# Patient Record
Sex: MALE | Race: WHITE | NOT HISPANIC OR LATINO | Employment: OTHER | URBAN - METROPOLITAN AREA
[De-identification: names, ages, dates, MRNs, and addresses within clinical notes are randomized per-mention and may not be internally consistent; named-entity substitution may affect disease eponyms.]

---

## 2020-09-04 ENCOUNTER — OUTSIDE PLACE OF SERVICE (OUTPATIENT)
Dept: ADMINISTRATIVE | Facility: OTHER | Age: 65
End: 2020-09-04
Payer: MEDICARE

## 2020-09-04 PROCEDURE — 99222 1ST HOSP IP/OBS MODERATE 55: CPT | Mod: ,,, | Performed by: THORACIC SURGERY (CARDIOTHORACIC VASCULAR SURGERY)

## 2020-09-04 PROCEDURE — 99222 PR INITIAL HOSPITAL CARE,LEVL II: ICD-10-PCS | Mod: ,,, | Performed by: THORACIC SURGERY (CARDIOTHORACIC VASCULAR SURGERY)

## 2021-10-02 PROBLEM — R55 SYNCOPE: Status: ACTIVE | Noted: 2021-10-02

## 2021-10-02 PROBLEM — F10.20 CHRONIC ALCOHOLISM: Status: ACTIVE | Noted: 2021-10-02

## 2021-10-02 PROBLEM — R79.89 ABNORMAL LIVER FUNCTION TEST: Status: ACTIVE | Noted: 2021-10-02

## 2021-10-02 PROBLEM — K80.20 CHOLELITHIASIS WITHOUT CHOLECYSTITIS: Status: ACTIVE | Noted: 2021-10-02

## 2021-10-02 PROBLEM — I71.40 ABDOMINAL AORTIC ANEURYSM: Status: ACTIVE | Noted: 2021-10-02

## 2021-10-02 PROBLEM — R55 SYNCOPE AND COLLAPSE: Status: ACTIVE | Noted: 2021-10-02

## 2021-10-02 PROBLEM — E11.9 TYPE 2 DIABETES MELLITUS: Status: ACTIVE | Noted: 2021-10-02

## 2021-10-02 PROBLEM — J43.8 OTHER EMPHYSEMA: Status: ACTIVE | Noted: 2021-10-02

## 2021-10-02 PROBLEM — I10 ESSENTIAL HYPERTENSION: Status: ACTIVE | Noted: 2021-10-02

## 2021-10-02 PROBLEM — I72.3 ANEURYSM OF LEFT ILIAC ARTERY: Status: ACTIVE | Noted: 2021-10-02

## 2021-10-03 PROBLEM — R53.1 WEAKNESS: Status: ACTIVE | Noted: 2021-10-03

## 2021-10-04 PROBLEM — Z78.9 ALCOHOL USE: Status: ACTIVE | Noted: 2021-10-04

## 2021-10-04 PROBLEM — F10.20 CHRONIC ALCOHOLISM: Status: RESOLVED | Noted: 2021-10-02 | Resolved: 2021-10-04

## 2021-10-04 PROBLEM — F10.90 ALCOHOL USE: Status: ACTIVE | Noted: 2021-10-04

## 2021-10-04 PROBLEM — G62.9 NEUROPATHY: Status: ACTIVE | Noted: 2021-10-04

## 2021-10-04 PROBLEM — R55 SYNCOPE: Status: RESOLVED | Noted: 2021-10-02 | Resolved: 2021-10-04

## 2021-10-04 PROBLEM — Z71.89 ACP (ADVANCE CARE PLANNING): Status: ACTIVE | Noted: 2021-10-04

## 2021-10-04 PROBLEM — J43.8 OTHER EMPHYSEMA: Status: RESOLVED | Noted: 2021-10-02 | Resolved: 2021-10-04

## 2021-10-04 PROBLEM — J43.9 EMPHYSEMA OF LUNG: Status: ACTIVE | Noted: 2021-10-04

## 2021-10-04 PROBLEM — R00.1 BRADYCARDIA: Status: ACTIVE | Noted: 2021-10-04

## 2021-10-07 PROBLEM — E53.8 VITAMIN B12 DEFICIENCY: Status: RESOLVED | Noted: 2021-10-07 | Resolved: 2021-10-07

## 2021-10-07 PROBLEM — E53.8 VITAMIN B12 DEFICIENCY: Status: ACTIVE | Noted: 2021-10-07

## 2021-10-08 PROBLEM — I95.1 ORTHOSTATIC SYNCOPE: Status: ACTIVE | Noted: 2021-10-08

## 2021-12-22 ENCOUNTER — LAB VISIT (OUTPATIENT)
Dept: LAB | Facility: HOSPITAL | Age: 66
End: 2021-12-22
Attending: NURSE PRACTITIONER
Payer: MEDICARE

## 2021-12-22 ENCOUNTER — OFFICE VISIT (OUTPATIENT)
Dept: INTERNAL MEDICINE | Facility: CLINIC | Age: 66
End: 2021-12-22
Payer: MEDICARE

## 2021-12-22 ENCOUNTER — PATIENT MESSAGE (OUTPATIENT)
Dept: INTERNAL MEDICINE | Facility: CLINIC | Age: 66
End: 2021-12-22

## 2021-12-22 VITALS
HEART RATE: 78 BPM | BODY MASS INDEX: 21.71 KG/M2 | WEIGHT: 178.38 LBS | RESPIRATION RATE: 16 BRPM | SYSTOLIC BLOOD PRESSURE: 120 MMHG | DIASTOLIC BLOOD PRESSURE: 70 MMHG | TEMPERATURE: 98 F

## 2021-12-22 DIAGNOSIS — I10 ESSENTIAL HYPERTENSION: Primary | ICD-10-CM

## 2021-12-22 DIAGNOSIS — R74.8 ELEVATED LIVER ENZYMES: ICD-10-CM

## 2021-12-22 DIAGNOSIS — I72.3 ANEURYSM OF LEFT ILIAC ARTERY: ICD-10-CM

## 2021-12-22 DIAGNOSIS — F32.A DEPRESSION, UNSPECIFIED DEPRESSION TYPE: ICD-10-CM

## 2021-12-22 DIAGNOSIS — E78.5 HYPERLIPIDEMIA, UNSPECIFIED HYPERLIPIDEMIA TYPE: ICD-10-CM

## 2021-12-22 DIAGNOSIS — I25.10 CORONARY ARTERY DISEASE, UNSPECIFIED VESSEL OR LESION TYPE, UNSPECIFIED WHETHER ANGINA PRESENT, UNSPECIFIED WHETHER NATIVE OR TRANSPLANTED HEART: ICD-10-CM

## 2021-12-22 DIAGNOSIS — J44.9 CHRONIC OBSTRUCTIVE PULMONARY DISEASE, UNSPECIFIED COPD TYPE: ICD-10-CM

## 2021-12-22 DIAGNOSIS — E11.9 TYPE 2 DIABETES MELLITUS WITHOUT COMPLICATION, WITHOUT LONG-TERM CURRENT USE OF INSULIN: ICD-10-CM

## 2021-12-22 DIAGNOSIS — I71.40 ABDOMINAL AORTIC ANEURYSM (AAA) WITHOUT RUPTURE: ICD-10-CM

## 2021-12-22 DIAGNOSIS — K80.20 CALCULUS OF GALLBLADDER WITHOUT CHOLECYSTITIS WITHOUT OBSTRUCTION: ICD-10-CM

## 2021-12-22 DIAGNOSIS — G62.9 NEUROPATHY: ICD-10-CM

## 2021-12-22 PROBLEM — R79.89 ELEVATED LIVER FUNCTION TESTS: Status: ACTIVE | Noted: 2021-12-09

## 2021-12-22 PROBLEM — F10.20 ALCOHOLISM: Status: ACTIVE | Noted: 2020-05-26

## 2021-12-22 PROCEDURE — 36415 COLL VENOUS BLD VENIPUNCTURE: CPT | Performed by: NURSE PRACTITIONER

## 2021-12-22 PROCEDURE — 80053 COMPREHEN METABOLIC PANEL: CPT | Performed by: NURSE PRACTITIONER

## 2021-12-22 PROCEDURE — 99999 PR PBB SHADOW E&M-EST. PATIENT-LVL V: CPT | Mod: PBBFAC,,, | Performed by: NURSE PRACTITIONER

## 2021-12-22 PROCEDURE — 99999 PR PBB SHADOW E&M-EST. PATIENT-LVL V: ICD-10-PCS | Mod: PBBFAC,,, | Performed by: NURSE PRACTITIONER

## 2021-12-22 PROCEDURE — 99204 OFFICE O/P NEW MOD 45 MIN: CPT | Mod: S$PBB,,, | Performed by: NURSE PRACTITIONER

## 2021-12-22 PROCEDURE — 99215 OFFICE O/P EST HI 40 MIN: CPT | Mod: PBBFAC | Performed by: NURSE PRACTITIONER

## 2021-12-22 PROCEDURE — 99204 PR OFFICE/OUTPT VISIT, NEW, LEVL IV, 45-59 MIN: ICD-10-PCS | Mod: S$PBB,,, | Performed by: NURSE PRACTITIONER

## 2021-12-22 RX ORDER — METOPROLOL TARTRATE 50 MG/1
50 TABLET ORAL
COMMUNITY
Start: 2021-12-09 | End: 2022-01-25

## 2021-12-22 RX ORDER — METFORMIN HYDROCHLORIDE 1000 MG/1
1000 TABLET ORAL 2 TIMES DAILY WITH MEALS
Qty: 180 TABLET | Refills: 1 | Status: SHIPPED | OUTPATIENT
Start: 2021-12-22 | End: 2022-04-19 | Stop reason: SDUPTHER

## 2021-12-22 RX ORDER — AMLODIPINE BESYLATE 10 MG/1
10 TABLET ORAL DAILY
Qty: 90 TABLET | Refills: 1 | Status: SHIPPED | OUTPATIENT
Start: 2021-12-22 | End: 2022-07-25 | Stop reason: SDUPTHER

## 2021-12-22 RX ORDER — METFORMIN HYDROCHLORIDE 1000 MG/1
1000 TABLET ORAL
COMMUNITY
Start: 2021-12-09 | End: 2021-12-22 | Stop reason: SDUPTHER

## 2021-12-22 RX ORDER — ACETAMINOPHEN 325 MG/1
650 TABLET, FILM COATED ORAL EVERY 6 HOURS PRN
COMMUNITY
Start: 2021-12-20

## 2021-12-22 RX ORDER — CITALOPRAM 20 MG/1
20 TABLET, FILM COATED ORAL DAILY
Qty: 90 TABLET | Refills: 3 | Status: SHIPPED | OUTPATIENT
Start: 2021-12-22 | End: 2023-02-27 | Stop reason: SDUPTHER

## 2021-12-22 RX ORDER — AMLODIPINE BESYLATE 10 MG/1
TABLET ORAL
COMMUNITY
Start: 2021-12-06 | End: 2021-12-22 | Stop reason: SDUPTHER

## 2021-12-22 RX ORDER — CLONIDINE HYDROCHLORIDE 0.2 MG/1
0.2 TABLET ORAL
COMMUNITY
Start: 2021-12-09 | End: 2022-01-25

## 2021-12-22 RX ORDER — ZINC GLUCONATE 50 MG
1000 TABLET ORAL
COMMUNITY
Start: 2021-12-20

## 2021-12-23 LAB
ALBUMIN SERPL BCP-MCNC: 3.6 G/DL (ref 3.5–5.2)
ALP SERPL-CCNC: 92 U/L (ref 55–135)
ALT SERPL W/O P-5'-P-CCNC: 20 U/L (ref 10–44)
ANION GAP SERPL CALC-SCNC: 8 MMOL/L (ref 8–16)
AST SERPL-CCNC: 19 U/L (ref 10–40)
BILIRUB SERPL-MCNC: 0.4 MG/DL (ref 0.1–1)
BUN SERPL-MCNC: 10 MG/DL (ref 8–23)
CALCIUM SERPL-MCNC: 10 MG/DL (ref 8.7–10.5)
CHLORIDE SERPL-SCNC: 100 MMOL/L (ref 95–110)
CO2 SERPL-SCNC: 29 MMOL/L (ref 23–29)
CREAT SERPL-MCNC: 0.8 MG/DL (ref 0.5–1.4)
EST. GFR  (AFRICAN AMERICAN): >60 ML/MIN/1.73 M^2
EST. GFR  (NON AFRICAN AMERICAN): >60 ML/MIN/1.73 M^2
GLUCOSE SERPL-MCNC: 77 MG/DL (ref 70–110)
POTASSIUM SERPL-SCNC: 4.6 MMOL/L (ref 3.5–5.1)
PROT SERPL-MCNC: 7.1 G/DL (ref 6–8.4)
SODIUM SERPL-SCNC: 137 MMOL/L (ref 136–145)

## 2021-12-23 RX ORDER — NEBULIZER AND COMPRESSOR
1 EACH MISCELLANEOUS 2 TIMES DAILY
Qty: 1 EACH | Refills: 0 | COMMUNITY
Start: 2021-12-23

## 2021-12-23 RX ORDER — LANCETS
EACH MISCELLANEOUS
Qty: 100 EACH | Refills: 4 | Status: SHIPPED | OUTPATIENT
Start: 2021-12-23

## 2021-12-23 RX ORDER — INSULIN PUMP SYRINGE, 3 ML
EACH MISCELLANEOUS
Qty: 1 EACH | Refills: 0 | Status: SHIPPED | OUTPATIENT
Start: 2021-12-23 | End: 2024-02-05

## 2022-01-20 ENCOUNTER — TELEPHONE (OUTPATIENT)
Dept: CARDIOLOGY | Facility: CLINIC | Age: 67
End: 2022-01-20
Payer: MEDICARE

## 2022-01-20 DIAGNOSIS — I10 ESSENTIAL HYPERTENSION: Primary | ICD-10-CM

## 2022-01-20 NOTE — TELEPHONE ENCOUNTER
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     Pt is staying with son and his wife and they are citlaly proactive    Home health nurse is concerned about pt weight gaining.

## 2022-01-20 NOTE — TELEPHONE ENCOUNTER
Contacted edouard with [facility] and [facility] for her to call back.            ----- Message from Robbie Rachel sent at 1/20/2022 11:45 AM CST -----  Regarding: discuss care for mutual patient  Contact: Edouard([employer])  Edouard([employer]) would like a call back at [phone] she would to discuss medical findings that was noticed on last visit

## 2022-01-21 DIAGNOSIS — I95.1 ORTHOSTATIC SYNCOPE: ICD-10-CM

## 2022-01-21 DIAGNOSIS — R06.09 DOE (DYSPNEA ON EXERTION): Primary | ICD-10-CM

## 2022-01-24 ENCOUNTER — TELEPHONE (OUTPATIENT)
Dept: INTERNAL MEDICINE | Facility: CLINIC | Age: 67
End: 2022-01-24
Payer: MEDICARE

## 2022-01-24 NOTE — TELEPHONE ENCOUNTER
S/w pt son and informed that pt has plenty of refills showing on medication and to have previous refills transferred to the current pharmacy. Informed that if any issues arise while trying to accomplish this then to give the clinic a call back. Pt son voiced understanding./Goldy

## 2022-01-24 NOTE — TELEPHONE ENCOUNTER
----- Message from Brittany Myrick sent at 1/24/2022 12:14 PM CST -----  Contact: Kiran/son  Type:  RX Refill Request    Who Called: Kiran  Refill or New Rx: refill  RX Name and Strength: Gabapentin 300 mg  How is the patient currently taking it? (ex. 1XDay): 3 pills 1xday  Is this a 30 day or 90 day RX: 90 if possible  Preferred Pharmacy with phone number:   [facility] [address] [address]  [address] [address] [address]  Phone: [phone] Fax: [phone]  Local or Mail Order: Local  Ordering Provider:   Would the patient rather a call back or a response via [facility]? Call back  Best Call Back Number: Please call him at [phone]  Additional Information:

## 2022-01-25 ENCOUNTER — OFFICE VISIT (OUTPATIENT)
Dept: CARDIOLOGY | Facility: CLINIC | Age: 67
End: 2022-01-25
Payer: MEDICARE

## 2022-01-25 ENCOUNTER — HOSPITAL ENCOUNTER (OUTPATIENT)
Dept: CARDIOLOGY | Facility: HOSPITAL | Age: 67
Discharge: HOME | End: 2022-01-25
Attending: INTERNAL MEDICINE
Payer: MEDICARE

## 2022-01-25 VITALS
WEIGHT: 194 LBS | BODY MASS INDEX: 23.61 KG/M2 | HEART RATE: 64 BPM | OXYGEN SATURATION: 98 % | SYSTOLIC BLOOD PRESSURE: 148 MMHG | DIASTOLIC BLOOD PRESSURE: 66 MMHG

## 2022-01-25 DIAGNOSIS — I65.23 BILATERAL CAROTID ARTERY STENOSIS: ICD-10-CM

## 2022-01-25 DIAGNOSIS — E11.9 TYPE 2 DIABETES MELLITUS WITHOUT COMPLICATION, WITHOUT LONG-TERM CURRENT USE OF INSULIN: ICD-10-CM

## 2022-01-25 DIAGNOSIS — I10 ESSENTIAL HYPERTENSION: ICD-10-CM

## 2022-01-25 DIAGNOSIS — R06.02 SOB (SHORTNESS OF BREATH): Primary | ICD-10-CM

## 2022-01-25 DIAGNOSIS — E78.2 MIXED HYPERLIPIDEMIA: ICD-10-CM

## 2022-01-25 DIAGNOSIS — I73.9 PAD (PERIPHERAL ARTERY DISEASE): ICD-10-CM

## 2022-01-25 DIAGNOSIS — I71.40 ABDOMINAL AORTIC ANEURYSM (AAA) WITHOUT RUPTURE: ICD-10-CM

## 2022-01-25 DIAGNOSIS — I72.3 ANEURYSM OF LEFT ILIAC ARTERY: ICD-10-CM

## 2022-01-25 PROCEDURE — 99205 PR OFFICE/OUTPT VISIT, NEW, LEVL V, 60-74 MIN: ICD-10-PCS | Mod: S$PBB,,, | Performed by: INTERNAL MEDICINE

## 2022-01-25 PROCEDURE — 93010 ELECTROCARDIOGRAM REPORT: CPT | Mod: ,,, | Performed by: INTERNAL MEDICINE

## 2022-01-25 PROCEDURE — 99205 OFFICE O/P NEW HI 60 MIN: CPT | Mod: S$PBB,,, | Performed by: INTERNAL MEDICINE

## 2022-01-25 PROCEDURE — 99999 PR PBB SHADOW E&M-EST. PATIENT-LVL III: ICD-10-PCS | Mod: PBBFAC,,, | Performed by: INTERNAL MEDICINE

## 2022-01-25 PROCEDURE — 99213 OFFICE O/P EST LOW 20 MIN: CPT | Mod: PBBFAC | Performed by: INTERNAL MEDICINE

## 2022-01-25 PROCEDURE — 93010 EKG 12-LEAD: ICD-10-PCS | Mod: ,,, | Performed by: INTERNAL MEDICINE

## 2022-01-25 PROCEDURE — 99999 PR PBB SHADOW E&M-EST. PATIENT-LVL III: CPT | Mod: PBBFAC,,, | Performed by: INTERNAL MEDICINE

## 2022-01-25 PROCEDURE — 93005 ELECTROCARDIOGRAM TRACING: CPT

## 2022-01-25 RX ORDER — SIMVASTATIN 20 MG/1
20 TABLET, FILM COATED ORAL NIGHTLY
Start: 2022-01-25 | End: 2022-03-17 | Stop reason: SDUPTHER

## 2022-01-25 RX ORDER — METOPROLOL SUCCINATE 25 MG/1
25 TABLET, EXTENDED RELEASE ORAL DAILY
Qty: 30 TABLET | Refills: 5 | Status: SHIPPED | OUTPATIENT
Start: 2022-01-25 | End: 2022-01-25 | Stop reason: SDUPTHER

## 2022-01-25 RX ORDER — METOPROLOL SUCCINATE 25 MG/1
25 TABLET, EXTENDED RELEASE ORAL DAILY
Qty: 30 TABLET | Refills: 5 | Status: SHIPPED | OUTPATIENT
Start: 2022-01-25 | End: 2022-02-15

## 2022-01-25 RX ORDER — CALCIUM CARBONATE/VITAMIN D3 600 MG-10
100 TABLET ORAL EVERY MORNING
COMMUNITY
Start: 2021-12-27

## 2022-01-25 NOTE — PROGRESS NOTES
Subjective:   Patient ID:  Lamine Fong is a 66 y.o. male who presents for evaluation of No chief complaint on file.      65 yo male came in for care establish. Accompanied with his son.  PMH orthostatic hypotension. DM > 5 yrs former heavy drinker and smoker, coronary atherosclerosis. PAD  In , c/o+ postaural dizziness when taking amlodipine, metorpolol and clonidine.   Now on amlodipine only for HTN and dizziness resolved.   Drinking issue on and off for 30 years. Pint vodka and beers daily, Off alcohol for 30 days,   Smoker 40 yrs 1ppd and quit 2 months ago, now vaping and patch   echo biv function;  Carotid US There is less than 50% diameter reduction on the right. There is 50-69% stenosis within the left proximal and mid ICA  Brain mild microvascular Dz   abd CT Coronary and aortic calcific atherosclerosis are noted. AAA 4.6 cm infra renal   Today EKG NSR    Good appetite, weight gain   No SOB chest pain dizziness faint   Claudication if walking a lot        Past Medical History:   Diagnosis Date    Alcohol abuse     Carotid artery occlusion     COPD (chronic obstructive pulmonary disease)     Coronary artery disease     Depression     Diabetes mellitus     Hypertension     Hypokalemia     Hypomagnesemia     SHANELLE (obstructive sleep apnea)        Past Surgical History:   Procedure Laterality Date    HERNIA REPAIR      umbilical       Social History     Tobacco Use    Smoking status: Current Every Day Smoker     Packs/day: 1.00     Years: 25.00     Pack years: 25.00    Smokeless tobacco: Never Used   Substance Use Topics    Alcohol use: Yes     Comment: 3-4 beers daily and 1/2 pint of vodka daily    Drug use: No       Family History   Problem Relation Age of Onset    Diabetes Mellitus Father        Review of Systems   Constitutional: Negative for decreased appetite, diaphoresis, fever, malaise/fatigue and night sweats.   HENT: Negative for nosebleeds.    Eyes: Negative for  blurred vision and double vision.   Cardiovascular: Negative for chest pain, claudication, dyspnea on exertion, irregular heartbeat, leg swelling, near-syncope, orthopnea, palpitations, paroxysmal nocturnal dyspnea and syncope.   Respiratory: Negative for cough, shortness of breath, sleep disturbances due to breathing, snoring, sputum production and wheezing.    Endocrine: Negative for cold intolerance and polyuria.   Hematologic/Lymphatic: Does not bruise/bleed easily.   Skin: Negative for rash.   Musculoskeletal: Positive for back pain. Negative for falls, joint pain, joint swelling and neck pain.   Gastrointestinal: Negative for abdominal pain, heartburn, nausea and vomiting.   Genitourinary: Negative for dysuria, frequency and hematuria.   Neurological: Positive for dizziness. Negative for difficulty with concentration, focal weakness, headaches, light-headedness, numbness, seizures and weakness.   Psychiatric/Behavioral: Negative for depression, memory loss and substance abuse. The patient does not have insomnia.    Allergic/Immunologic: Negative for HIV exposure and hives.       Objective:   Physical Exam  HENT:      Head: Normocephalic.   Eyes:      Pupils: Pupils are equal, round, and reactive to light.   Neck:      Thyroid: No thyromegaly.      Vascular: Normal carotid pulses. No carotid bruit or JVD.   Cardiovascular:      Rate and Rhythm: Normal rate and regular rhythm.  No extrasystoles are present.     Chest Wall: PMI is not displaced.      Pulses: Normal pulses.      Heart sounds: Normal heart sounds. No murmur heard.  No gallop. No S3 sounds.    Pulmonary:      Effort: No respiratory distress.      Breath sounds: Normal breath sounds. No stridor.   Abdominal:      General: Bowel sounds are normal.      Palpations: Abdomen is soft.      Tenderness: There is no abdominal tenderness. There is no rebound.   Musculoskeletal:         General: Normal range of motion.   Skin:     Findings: No rash.    Neurological:      Mental Status: He is alert and oriented to person, place, and time.   Psychiatric:         Behavior: Behavior normal.         Lab Results   Component Value Date    CHOL 123 10/02/2021    CHOL 142 06/29/2020     Lab Results   Component Value Date    HDL 47 10/02/2021    HDL 41 06/29/2020     Lab Results   Component Value Date    LDLCALC 63.6 10/02/2021    LDLCALC 77 06/29/2020     Lab Results   Component Value Date    TRIG 62 10/02/2021    TRIG 118 06/29/2020     Lab Results   Component Value Date    CHOLHDL 38.2 10/02/2021    CHOLHDL 3.5 06/29/2020       Chemistry        Component Value Date/Time     12/22/2021 1218    K 4.6 12/22/2021 1218     12/22/2021 1218    CO2 29 12/22/2021 1218    BUN 10 12/22/2021 1218    CREATININE 0.8 12/22/2021 1218    GLU 77 12/22/2021 1218        Component Value Date/Time    CALCIUM 10.0 12/22/2021 1218    ALKPHOS 92 12/22/2021 1218    AST 19 12/22/2021 1218    ALT 20 12/22/2021 1218    BILITOT 0.4 12/22/2021 1218    ESTGFRAFRICA >60.0 12/22/2021 1218    EGFRNONAA >60.0 12/22/2021 1218          Lab Results   Component Value Date    HGBA1C 5.5 12/05/2021     Lab Results   Component Value Date    TSH 1.750 10/03/2021     No results found for: INR, PROTIME  Lab Results   Component Value Date    WBC 5.88 10/09/2021    HGB 13.8 (L) 10/09/2021    HCT 42.0 10/09/2021    MCV 82 10/09/2021     10/09/2021     BNP  @LABRCNTIP(BNP,BNPTRIAGEBLO)@  CrCl cannot be calculated (Patient's most recent lab result is older than the maximum 7 days allowed.).  No results found in the last 24 hours.  No results found in the last 24 hours.  No results found in the last 24 hours.    Assessment:      1. SOB (shortness of breath)    2. Bilateral carotid artery stenosis    3. Abdominal aortic aneurysm (AAA) without rupture    4. Aneurysm of left iliac artery    5. Essential hypertension    6. Mixed hyperlipidemia    7. Type 2 diabetes mellitus without complication, without  long-term current use of insulin    8. PAD (peripheral artery disease)        Plan:   Resume torpolXL 25 mg for HTN and AAA  Phamr MPI for SOB coronary atherosclerosis -> Intermediate pretest probability per: 2021 AHA/ACC/ASE/CHEST/SAEM/SCCT/SCMR Guideline for the Evaluation and Diagnosis of Chest Pain: A Report of the American College of Cardiology/American Heart Association Joint Committee on Clinical Practice Guidelines. J Am Lew Cardiol. 2021 Nov, 78 (50) [id number]  Exercsie ABIs  DM Rx per PCP  Counseled DASH  Check Lipid profile in 6 months  Recommend heart-healthy diet, weight control and regular exercise.  Melva. Risk modification.   I have reviewed all pertinent labs and cardiac studies independently. Plans and recommendations have been formulated under my direct supervision. All questions answered and patient voiced understanding.   If symptoms persist go to the ED  RTC in 3 months

## 2022-01-31 ENCOUNTER — TELEPHONE (OUTPATIENT)
Dept: INTERNAL MEDICINE | Facility: CLINIC | Age: 67
End: 2022-01-31
Payer: MEDICARE

## 2022-01-31 NOTE — TELEPHONE ENCOUNTER
----- Message from Flavia Cheung sent at 1/31/2022  2:49 PM CST -----  Contact: [phone] @ [facility]  Patient would like to get medical advice.  Symptoms (please be specific):  Wound on tip of both big toe  How long have you had these symptoms:   Would you like a call back, or a response through your [facility] portal?: call   Pharmacy name and phone # (copy from chart):    Comments:  would like a referral for a podiatry

## 2022-02-01 ENCOUNTER — OFFICE VISIT (OUTPATIENT)
Dept: INTERNAL MEDICINE | Facility: CLINIC | Age: 67
End: 2022-02-01
Payer: MEDICARE

## 2022-02-01 ENCOUNTER — OFFICE VISIT (OUTPATIENT)
Dept: OPHTHALMOLOGY | Facility: CLINIC | Age: 67
End: 2022-02-01
Payer: MEDICARE

## 2022-02-01 VITALS
SYSTOLIC BLOOD PRESSURE: 168 MMHG | DIASTOLIC BLOOD PRESSURE: 70 MMHG | HEIGHT: 76 IN | HEART RATE: 47 BPM | TEMPERATURE: 98 F | WEIGHT: 195.75 LBS | OXYGEN SATURATION: 99 % | BODY MASS INDEX: 23.84 KG/M2

## 2022-02-01 DIAGNOSIS — E11.9 TYPE 2 DIABETES MELLITUS WITHOUT COMPLICATION, WITHOUT LONG-TERM CURRENT USE OF INSULIN: ICD-10-CM

## 2022-02-01 DIAGNOSIS — E11.59 HYPERTENSION ASSOCIATED WITH DIABETES: Chronic | ICD-10-CM

## 2022-02-01 DIAGNOSIS — F10.11 HISTORY OF ALCOHOL ABUSE: ICD-10-CM

## 2022-02-01 DIAGNOSIS — E11.36 DIABETIC CATARACT OF BOTH EYES: ICD-10-CM

## 2022-02-01 DIAGNOSIS — E11.69 ONYCHOMYCOSIS OF MULTIPLE TOENAILS WITH TYPE 2 DIABETES MELLITUS AND PERIPHERAL NEUROPATHY: ICD-10-CM

## 2022-02-01 DIAGNOSIS — H52.4 PRESBYOPIA: ICD-10-CM

## 2022-02-01 DIAGNOSIS — E11.42 ONYCHOMYCOSIS OF MULTIPLE TOENAILS WITH TYPE 2 DIABETES MELLITUS AND PERIPHERAL NEUROPATHY: ICD-10-CM

## 2022-02-01 DIAGNOSIS — L57.8 SUN-DAMAGED SKIN: ICD-10-CM

## 2022-02-01 DIAGNOSIS — E11.69 HYPERLIPIDEMIA ASSOCIATED WITH TYPE 2 DIABETES MELLITUS: Chronic | ICD-10-CM

## 2022-02-01 DIAGNOSIS — B35.1 ONYCHOMYCOSIS OF MULTIPLE TOENAILS WITH TYPE 2 DIABETES MELLITUS AND PERIPHERAL NEUROPATHY: ICD-10-CM

## 2022-02-01 DIAGNOSIS — E11.621 TYPE 2 DIABETES MELLITUS WITH FOOT ULCER, WITHOUT LONG-TERM CURRENT USE OF INSULIN: Primary | ICD-10-CM

## 2022-02-01 DIAGNOSIS — F32.A DEPRESSION, UNSPECIFIED DEPRESSION TYPE: ICD-10-CM

## 2022-02-01 DIAGNOSIS — E11.9 TYPE 2 DIABETES MELLITUS WITHOUT RETINOPATHY: Primary | ICD-10-CM

## 2022-02-01 DIAGNOSIS — E78.5 HYPERLIPIDEMIA ASSOCIATED WITH TYPE 2 DIABETES MELLITUS: Chronic | ICD-10-CM

## 2022-02-01 DIAGNOSIS — H25.013 CORTICAL AGE-RELATED CATARACT OF BOTH EYES: ICD-10-CM

## 2022-02-01 DIAGNOSIS — G62.9 NEUROPATHY: Chronic | ICD-10-CM

## 2022-02-01 DIAGNOSIS — L97.509 TYPE 2 DIABETES MELLITUS WITH FOOT ULCER, WITHOUT LONG-TERM CURRENT USE OF INSULIN: Primary | ICD-10-CM

## 2022-02-01 DIAGNOSIS — I15.2 HYPERTENSION ASSOCIATED WITH DIABETES: Chronic | ICD-10-CM

## 2022-02-01 DIAGNOSIS — H25.13 NUCLEAR SCLEROSIS, BILATERAL: ICD-10-CM

## 2022-02-01 DIAGNOSIS — H35.89 RPE MOTTLING OF MACULA: ICD-10-CM

## 2022-02-01 PROBLEM — R79.89 ABNORMAL LIVER FUNCTION TEST: Status: RESOLVED | Noted: 2021-10-02 | Resolved: 2022-02-01

## 2022-02-01 PROBLEM — F10.20 ALCOHOLISM: Status: RESOLVED | Noted: 2020-05-26 | Resolved: 2022-02-01

## 2022-02-01 PROBLEM — R55 POSTURAL DIZZINESS WITH NEAR SYNCOPE: Status: ACTIVE | Noted: 2021-12-06

## 2022-02-01 PROBLEM — R42 POSTURAL DIZZINESS WITH NEAR SYNCOPE: Status: ACTIVE | Noted: 2021-12-06

## 2022-02-01 PROBLEM — Z72.0 TOBACCO ABUSE: Status: ACTIVE | Noted: 2020-05-26

## 2022-02-01 PROBLEM — R79.89 ELEVATED LIVER FUNCTION TESTS: Status: RESOLVED | Noted: 2021-12-09 | Resolved: 2022-02-01

## 2022-02-01 PROBLEM — I50.32 CHRONIC DIASTOLIC (CONGESTIVE) HEART FAILURE: Status: ACTIVE | Noted: 2021-12-09

## 2022-02-01 PROBLEM — R53.1 WEAKNESS: Status: RESOLVED | Noted: 2021-10-03 | Resolved: 2022-02-01

## 2022-02-01 PROBLEM — Z71.89 ACP (ADVANCE CARE PLANNING): Status: RESOLVED | Noted: 2021-10-04 | Resolved: 2022-02-01

## 2022-02-01 PROBLEM — R55 SYNCOPE AND COLLAPSE: Status: RESOLVED | Noted: 2021-10-02 | Resolved: 2022-02-01

## 2022-02-01 PROBLEM — R06.02 SOB (SHORTNESS OF BREATH): Status: RESOLVED | Noted: 2022-01-25 | Resolved: 2022-02-01

## 2022-02-01 PROCEDURE — 99213 OFFICE O/P EST LOW 20 MIN: CPT | Mod: PBBFAC,25,27 | Performed by: OPTOMETRIST

## 2022-02-01 PROCEDURE — 99999 PR PBB SHADOW E&M-EST. PATIENT-LVL V: ICD-10-PCS | Mod: PBBFAC,,, | Performed by: FAMILY MEDICINE

## 2022-02-01 PROCEDURE — 99215 OFFICE O/P EST HI 40 MIN: CPT | Mod: S$PBB,,, | Performed by: FAMILY MEDICINE

## 2022-02-01 PROCEDURE — 92134 CPTRZ OPH DX IMG PST SGM RTA: CPT | Mod: PBBFAC | Performed by: OPTOMETRIST

## 2022-02-01 PROCEDURE — 99999 PR PBB SHADOW E&M-EST. PATIENT-LVL III: ICD-10-PCS | Mod: PBBFAC,,, | Performed by: OPTOMETRIST

## 2022-02-01 PROCEDURE — 99215 OFFICE O/P EST HI 40 MIN: CPT | Mod: PBBFAC,25 | Performed by: FAMILY MEDICINE

## 2022-02-01 PROCEDURE — 92004 PR EYE EXAM, NEW PATIENT,COMPREHESV: ICD-10-PCS | Mod: S$PBB,,, | Performed by: OPTOMETRIST

## 2022-02-01 PROCEDURE — 99999 PR PBB SHADOW E&M-EST. PATIENT-LVL V: CPT | Mod: PBBFAC,,, | Performed by: FAMILY MEDICINE

## 2022-02-01 PROCEDURE — 99215 PR OFFICE/OUTPT VISIT, EST, LEVL V, 40-54 MIN: ICD-10-PCS | Mod: S$PBB,,, | Performed by: FAMILY MEDICINE

## 2022-02-01 PROCEDURE — 92134 POSTERIOR SEGMENT OCT RETINA (OCULAR COHERENCE TOMOGRAPHY)-BOTH EYES: ICD-10-PCS | Mod: 26,S$PBB,, | Performed by: OPTOMETRIST

## 2022-02-01 PROCEDURE — 92004 COMPRE OPH EXAM NEW PT 1/>: CPT | Mod: S$PBB,,, | Performed by: OPTOMETRIST

## 2022-02-01 PROCEDURE — 99999 PR PBB SHADOW E&M-EST. PATIENT-LVL III: CPT | Mod: PBBFAC,,, | Performed by: OPTOMETRIST

## 2022-02-01 NOTE — PROGRESS NOTES
Subjective:   Patient ID: Lamine Fong is a 66 y.o. male.  Chief Complaint:  Establish Care    Presents with son to establish care  Recently discharged from alcohol rehab facility after undergoing detox  Currently participating in outpatient program to help prevent recurrent drinking  Reports today continuing to remain alcohol free    Patient also followed by Cardiology    Medical history:  - diabetes mellitus.  Currently with small ulcers on both great toes.  Metformin 1000 mg twice a day.  A1c 12/2021 5.5%.  Denies symptoms hypo or hyperglycemia.  Needs eye exam, foot exam, and micro albumin  - hypertension.  Currently not controlled.  Fluctuating blood pressures and orthostasis due to chronic alcoholism.  Currently amlodipine 10 mg daily and recently started Toprol-XL 25 mg daily.  Is undergoing additional imaging and testing by Cardiology.  Denies any shortness of breath or swelling.  -hyperlipidemia.  CMP with normal kidney, liver, glucose, electrolytes.  LDL at goal.  On simvastatin 20 mg daily.  Reports compliance.  Denies side effects.  No chest pain claudication.  -neuropathy.  Unspecified type.  On gabapentin 300 mg 3 tablets nightly  -history of alcohol abuse.  Currently alcohol free.  Chest pain outpatient program.  On multivitamin, folate, and vitamin-D supplements.  -depression.  Reports stable on citalopram 20 mg daily.      Current Outpatient Medications:     amLODIPine (NORVASC) 10 MG tablet, Take 1 tablet (10 mg total) by mouth once daily., Disp: 90 tablet, Rfl: 1    citalopram (CELEXA) 20 MG tablet, Take 1 tablet (20 mg total) by mouth once daily., Disp: 90 tablet, Rfl: 3    gabapentin (NEURONTIN) 300 MG capsule, Take 3 capsules (900 mg total) by mouth every evening., Disp: 90 capsule, Rfl: 11    metFORMIN (GLUCOPHAGE) 1000 MG tablet, Take 1 tablet (1,000 mg total) by mouth 2 (two) times daily with meals., Disp: 180 tablet, Rfl: 1    metoprolol succinate (TOPROL-XL) 25 MG 24 hr tablet,  "Take 1 tablet (25 mg total) by mouth once daily., Disp: 30 tablet, Rfl: 5    simvastatin (ZOCOR) 20 MG tablet, Take 1 tablet (20 mg total) by mouth every evening., Disp: , Rfl:     Review of Systems   Constitutional: Positive for activity change and fever. Negative for appetite change, chills, diaphoresis, fatigue and unexpected weight change.   Eyes: Negative for visual disturbance.   Respiratory: Negative for cough, chest tightness, shortness of breath and wheezing.    Cardiovascular: Negative for chest pain, palpitations and leg swelling.   Gastrointestinal: Negative for abdominal pain, constipation, diarrhea, nausea and vomiting.   Endocrine: Negative for cold intolerance, heat intolerance, polydipsia, polyphagia and polyuria.   Genitourinary: Negative for difficulty urinating.   Musculoskeletal: Negative for myalgias and neck pain.   Skin: Positive for wound. Negative for color change, pallor and rash.   Neurological: Negative for dizziness, tremors, syncope, weakness, light-headedness, numbness and headaches.   Hematological: Does not bruise/bleed easily.   Psychiatric/Behavioral: Negative for sleep disturbance. The patient is not nervous/anxious.      Objective:   BP (!) 168/70 (BP Location: Left arm, Patient Position: Sitting, BP Method: Large (Manual))   Pulse (!) 47   Temp 97.6 °F (36.4 °C) (Temporal)   Ht 6' 4" (1.93 m)   Wt 88.8 kg (195 lb 12.3 oz)   SpO2 99%   BMI 23.83 kg/m²     Physical Exam  Vitals and nursing note reviewed.   Constitutional:       General: He is not in acute distress.     Appearance: Normal appearance. He is well-developed and normal weight.      Comments:   Blood pressure elevated   Eyes:      General: No scleral icterus.        Right eye: No discharge.         Left eye: No discharge.      Conjunctiva/sclera: Conjunctivae normal.   Neck:      Vascular: No JVD.   Cardiovascular:      Rate and Rhythm: Regular rhythm. Bradycardia present.      Pulses:           Dorsalis pedis " pulses are 2+ on the right side and 2+ on the left side.        Posterior tibial pulses are 2+ on the right side and 2+ on the left side.      Heart sounds: Normal heart sounds. No murmur heard.  No friction rub. No gallop.    Pulmonary:      Effort: Pulmonary effort is normal.      Breath sounds: Normal breath sounds.   Abdominal:      General: There is no distension.      Palpations: Abdomen is soft.      Tenderness: There is no abdominal tenderness. There is no guarding or rebound.   Musculoskeletal:      Right lower leg: No edema.      Left lower leg: No edema.   Feet:      Right foot:      Protective Sensation: 5 sites tested. 2 sites sensed.      Skin integrity: Ulcer present. No blister, skin breakdown, erythema, warmth, callus, dry skin or fissure.      Toenail Condition: Right toenails are abnormally thick and long. Fungal disease present.     Left foot:      Protective Sensation: 5 sites tested. 2 sites sensed.      Skin integrity: Ulcer present. No blister, skin breakdown, erythema, warmth, callus, dry skin or fissure.      Toenail Condition: Left toenails are abnormally thick and long. Fungal disease present.  Skin:     Comments:   Chronic sun damaged skin with multiple actinic keratosis in various size and stages   Neurological:      General: No focal deficit present.      Mental Status: He is alert and oriented to person, place, and time.      Gait: Gait is intact.   Psychiatric:         Attention and Perception: Attention normal.         Mood and Affect: Mood and affect normal.         Behavior: Behavior normal.         Thought Content: Thought content normal.         Judgment: Judgment normal.       Assessment:       ICD-10-CM ICD-9-CM   1. Type 2 diabetes mellitus with foot ulcer, without long-term current use of insulin  E11.621 250.80    L97.509 707.15   2. Onychomycosis of multiple toenails with type 2 diabetes mellitus and peripheral neuropathy  E11.42 250.60    B35.1 357.2    E11.69 250.80      110.1   3. Sun-damaged skin  L57.8 692.79   4. Hypertension associated with diabetes  E11.59 250.80    I15.2 401.9   5. Hyperlipidemia associated with type 2 diabetes mellitus  E11.69 250.80    E78.5 272.4   6. Neuropathy  G62.9 355.9   7. History of alcohol abuse  F10.11 305.03   8. Depression, unspecified depression type  F32.A 311     Plan:   Type 2 diabetes mellitus with foot ulcer, without long-term current use of insulin  Onychomycosis of multiple toenails with type 2 diabetes mellitus and peripheral neuropathy  -     Ambulatory referral/consult to Podiatry; Future; Expected date: 02/08/2022  Controlled.  A1c normal.  Continue metformin 1000 mg twice a day  Eye exam scheduled  Microalbumin will be performed next visit  Referral to Podiatry for wound care  Continue daily cleansing and antibiotic ointment to wounds for now    Sun-damaged skin  -     Ambulatory referral/consult to Dermatology; Future; Expected date: 02/08/2022    Hypertension associated with diabetes  Poor control, but fluctuating blood pressures and orthostasis  Continue amlodipine 10 mg daily  Continue Toprol-XL 25 mg daily  Follow-up with cardiology as scheduled    Hyperlipidemia associated with type 2 diabetes mellitus  Controlled.  Stable.  Asymptomatic.  LDL at goal less than 70.  Continue simvastatin 20 mg daily    Neuropathy  Unspecified type  Continue gabapentin 300 mg 3 tablets nightly    History of alcohol abuse  Continue current multivitamins an additional supplements  Continue in outpatient treatment program    Depression, unspecified depression type  Stable, no side effects, mood and symptoms well controlled on present medication .  Continue citalopram 20 mg daily    Follow up with any/all specialists scheduled    Return to clinic 1 month     40+ minutes of total time spent on the encounter, which includes face to face time and non-face to face time preparing to see the patient (eg, review of tests), Obtaining and/or reviewing  separately obtained history, documenting clinical information in the electronic or other health record, independently interpreting results (not separately reported) and communicating results to the patient/family/caregiver, or Care coordination (not separately reported).

## 2022-02-01 NOTE — PROGRESS NOTES
HPI     Diabetic Eye Exam     Comments: Diagnosed with diabetes 3 years ago.   Lab Results       Component                Value               Date                       HGBA1C                   5.5                 12/05/2021                                 Comments     Last Eye Exam Unsure.  Patient states decrease with overall vision.  No ocular pain/discomfort.  Wear otc readers             Last edited by Karina Epperson on 2/1/2022  8:09 AM. (History)            Assessment /Plan     For exam results, see Encounter Report.    Type 2 diabetes mellitus without retinopathy  There was no diabetic retinopathy present in either eye today.   Recommended that pt continue care with PCP and/or specialists regarding diabetes.  Follow-up dilated eye exam recommended in 12 months, sooner with any vision changes or new concerns.    Nuclear sclerosis, bilateral  Cortical age-related cataract of both eyes  Diabetic cataract of both eyes  Cataracts not significantly affecting activities of daily living and therefore surgery is not indicated at this time.   Will continue to monitor over the next 12 months. Pt to call or RTC with any significant change in vision prior to next visit.     RPE mottling of macula  -     Posterior Segment OCT Retina-Both eyes  Exam findings are consistent with mild RPE mottling/amd OD. Recommended patient begin AREDS vitamins.  Baseline mOCT today with no SRF OD  Instructed to return to clinic as soon as possible if vision changes before next follow-up appointment.    Presbyopia  Ok to c/w OTC readers    RTC 1 yr for dilated eye exam with mOCT or PRN if any problems.   Discussed above and answered questions.

## 2022-02-02 ENCOUNTER — TELEPHONE (OUTPATIENT)
Dept: PRIMARY CARE CLINIC | Facility: CLINIC | Age: 67
End: 2022-02-02
Payer: MEDICARE

## 2022-02-02 NOTE — TELEPHONE ENCOUNTER
Spoke with J[facility] regarding message, she states message was suppose to be sent to provider Juan Michelle, not Nakita Wallace.      ----- Message from Rose Lozano sent at 2/2/2022  9:16 AM CST -----  Contact: [facility] [phone]  Patient would like to get medical advice.    Comments:   Requesting a call back regarding patient. Jazmin is returning a call yesterday.

## 2022-02-07 ENCOUNTER — HOSPITAL ENCOUNTER (OUTPATIENT)
Dept: RADIOLOGY | Facility: HOSPITAL | Age: 67
Discharge: HOME | End: 2022-02-07
Attending: INTERNAL MEDICINE
Payer: MEDICARE

## 2022-02-07 ENCOUNTER — HOSPITAL ENCOUNTER (OUTPATIENT)
Dept: CARDIOLOGY | Facility: HOSPITAL | Age: 67
Discharge: HOME | End: 2022-02-07
Attending: INTERNAL MEDICINE
Payer: MEDICARE

## 2022-02-07 ENCOUNTER — TELEPHONE (OUTPATIENT)
Dept: CARDIOLOGY | Facility: HOSPITAL | Age: 67
End: 2022-02-07
Payer: MEDICARE

## 2022-02-07 ENCOUNTER — HOSPITAL ENCOUNTER (OUTPATIENT)
Dept: CARDIOLOGY | Facility: HOSPITAL | Age: 67
Discharge: HOME | End: 2022-02-07
Attending: NURSE PRACTITIONER
Payer: MEDICARE

## 2022-02-07 VITALS
WEIGHT: 195 LBS | HEIGHT: 76 IN | DIASTOLIC BLOOD PRESSURE: 66 MMHG | BODY MASS INDEX: 23.75 KG/M2 | SYSTOLIC BLOOD PRESSURE: 148 MMHG

## 2022-02-07 DIAGNOSIS — I73.9 PAD (PERIPHERAL ARTERY DISEASE): Primary | ICD-10-CM

## 2022-02-07 DIAGNOSIS — R06.09 DOE (DYSPNEA ON EXERTION): ICD-10-CM

## 2022-02-07 DIAGNOSIS — I95.1 ORTHOSTATIC SYNCOPE: ICD-10-CM

## 2022-02-07 DIAGNOSIS — I73.9 PAD (PERIPHERAL ARTERY DISEASE): ICD-10-CM

## 2022-02-07 DIAGNOSIS — R06.02 SOB (SHORTNESS OF BREATH): ICD-10-CM

## 2022-02-07 DIAGNOSIS — I71.40 ABDOMINAL AORTIC ANEURYSM (AAA) WITHOUT RUPTURE: ICD-10-CM

## 2022-02-07 LAB
AORTIC ROOT ANNULUS: 3.46 CM
AV INDEX (PROSTH): 0.54
AV MEAN GRADIENT: 4 MMHG
AV PEAK GRADIENT: 8 MMHG
AV REGURGITATION PRESSURE HALF TIME: 1236.8 MS
AV VALVE AREA: 1.73 CM2
AV VELOCITY RATIO: 0.55
BSA FOR ECHO PROCEDURE: 2.18 M2
CV ECHO LV RWT: 0.41 CM
CV STRESS BASE HR: 46 BPM
DIASTOLIC BLOOD PRESSURE: 60 MMHG
DOP CALC AO PEAK VEL: 1.39 M/S
DOP CALC AO VTI: 42.4 CM
DOP CALC LVOT AREA: 3.2 CM2
DOP CALC LVOT DIAMETER: 2.01 CM
DOP CALC LVOT PEAK VEL: 0.77 M/S
DOP CALC LVOT STROKE VOLUME: 73.26 CM3
DOP CALC RVOT PEAK VEL: 0.54 M/S
DOP CALC RVOT VTI: 12.8 CM
DOP CALCLVOT PEAK VEL VTI: 23.1 CM
E WAVE DECELERATION TIME: 211.44 MSEC
E/A RATIO: 1.26
E/E' RATIO: 10.12 M/S
ECHO EF ESTIMATED: 56 %
ECHO LV POSTERIOR WALL: 1.19 CM (ref 0.6–1.1)
EJECTION FRACTION: 45 %
FRACTIONAL SHORTENING: 29 % (ref 28–44)
INTERVENTRICULAR SEPTUM: 1.09 CM (ref 0.6–1.1)
IVRT: 97.05 MSEC
LA MAJOR: 5.26 CM
LA MINOR: 5.45 CM
LA WIDTH: 4.08 CM
LEFT ABI: 0.54
LEFT ARM BP: 178 MMHG
LEFT ATRIUM SIZE: 4.62 CM
LEFT ATRIUM VOLUME INDEX MOD: 23.5 ML/M2
LEFT ATRIUM VOLUME INDEX: 39.2 ML/M2
LEFT ATRIUM VOLUME MOD: 51.44 CM3
LEFT ATRIUM VOLUME: 85.77 CM3
LEFT DORSALIS PEDIS: 79 MMHG
LEFT INTERNAL DIMENSION IN SYSTOLE: 4.06 CM (ref 2.1–4)
LEFT POSTERIOR TIBIAL: 101 MMHG
LEFT TBI: 0.33
LEFT TOE PRESSURE: 62 MMHG
LEFT VENTRICLE DIASTOLIC VOLUME INDEX: 74.6 ML/M2
LEFT VENTRICLE DIASTOLIC VOLUME: 163.38 ML
LEFT VENTRICLE MASS INDEX: 125 G/M2
LEFT VENTRICLE SYSTOLIC VOLUME INDEX: 33.1 ML/M2
LEFT VENTRICLE SYSTOLIC VOLUME: 72.42 ML
LEFT VENTRICULAR INTERNAL DIMENSION IN DIASTOLE: 5.75 CM (ref 3.5–6)
LEFT VENTRICULAR MASS: 273.22 G
LV LATERAL E/E' RATIO: 10.75 M/S
LV SEPTAL E/E' RATIO: 9.56 M/S
LVOT MG: 1.24 MMHG
LVOT MV: 0.52 CM/S
MV PEAK A VEL: 0.68 M/S
MV PEAK E VEL: 0.86 M/S
NUC REST EJECTION FRACTION: 52
NUC STRESS EJECTION FRACTION: 59 %
OHS CV CPX 85 PERCENT MAX PREDICTED HEART RATE MALE: 129
OHS CV CPX MAX PREDICTED HEART RATE: 152
OHS CV CPX PATIENT IS FEMALE: 0
OHS CV CPX PATIENT IS MALE: 1
OHS CV CPX PEAK DIASTOLIC BLOOD PRESSURE: 68 MMHG
OHS CV CPX PEAK HEAR RATE: 59 BPM
OHS CV CPX PEAK RATE PRESSURE PRODUCT: 9971
OHS CV CPX PEAK SYSTOLIC BLOOD PRESSURE: 169 MMHG
OHS CV CPX PERCENT MAX PREDICTED HEART RATE ACHIEVED: 39
OHS CV CPX RATE PRESSURE PRODUCT PRESENTING: 7498
PISA AR MAX VEL: 3.32 M/S
PISA TR MAX VEL: 2.75 M/S
PULM VEIN S/D RATIO: 0.76
PV MEAN GRADIENT: 0.69 MMHG
PV PEAK D VEL: 0.91 M/S
PV PEAK S VEL: 0.69 M/S
PV PEAK VELOCITY: 0.93 CM/S
RA MAJOR: 5.26 CM
RA PRESSURE: 3 MMHG
RA WIDTH: 4.34 CM
RIGHT ABI: 0.54
RIGHT ARM BP: 186 MMHG
RIGHT DORSALIS PEDIS: 98 MMHG
RIGHT POSTERIOR TIBIAL: 100 MMHG
RIGHT TBI: 0.35
RIGHT TOE PRESSURE: 66 MMHG
RIGHT VENTRICULAR END-DIASTOLIC DIMENSION: 2.92 CM
SINUS: 2.79 CM
STJ: 2.62 CM
STRESS ECHO POST EXERCISE DUR SEC: 54 SECONDS
SYSTOLIC BLOOD PRESSURE: 163 MMHG
TDI LATERAL: 0.08 M/S
TDI SEPTAL: 0.09 M/S
TDI: 0.09 M/S
TR MAX PG: 30 MMHG
TRICUSPID ANNULAR PLANE SYSTOLIC EXCURSION: 2.67 CM
TV REST PULMONARY ARTERY PRESSURE: 33 MMHG

## 2022-02-07 PROCEDURE — 93922 ANKLE BRACHIAL INDICES (ABI): ICD-10-PCS | Mod: 26,,, | Performed by: INTERNAL MEDICINE

## 2022-02-07 PROCEDURE — 93018 CV STRESS TEST I&R ONLY: CPT | Mod: ,,, | Performed by: INTERNAL MEDICINE

## 2022-02-07 PROCEDURE — 93306 TTE W/DOPPLER COMPLETE: CPT | Mod: 26,,, | Performed by: INTERNAL MEDICINE

## 2022-02-07 PROCEDURE — 93922 UPR/L XTREMITY ART 2 LEVELS: CPT

## 2022-02-07 PROCEDURE — 93016 STRESS TEST WITH MYOCARDIAL PERFUSION (CUPID ONLY): ICD-10-PCS | Mod: ,,, | Performed by: INTERNAL MEDICINE

## 2022-02-07 PROCEDURE — 93018 STRESS TEST WITH MYOCARDIAL PERFUSION (CUPID ONLY): ICD-10-PCS | Mod: ,,, | Performed by: INTERNAL MEDICINE

## 2022-02-07 PROCEDURE — 93016 CV STRESS TEST SUPVJ ONLY: CPT | Mod: ,,, | Performed by: INTERNAL MEDICINE

## 2022-02-07 PROCEDURE — 78452 HT MUSCLE IMAGE SPECT MULT: CPT | Mod: 26,,, | Performed by: INTERNAL MEDICINE

## 2022-02-07 PROCEDURE — 93922 UPR/L XTREMITY ART 2 LEVELS: CPT | Mod: 26,,, | Performed by: INTERNAL MEDICINE

## 2022-02-07 PROCEDURE — 78452 STRESS TEST WITH MYOCARDIAL PERFUSION (CUPID ONLY): ICD-10-PCS | Mod: 26,,, | Performed by: INTERNAL MEDICINE

## 2022-02-07 PROCEDURE — 93306 ECHO (CUPID ONLY): ICD-10-PCS | Mod: 26,,, | Performed by: INTERNAL MEDICINE

## 2022-02-07 PROCEDURE — 93017 CV STRESS TEST TRACING ONLY: CPT

## 2022-02-07 PROCEDURE — A9502 TC99M TETROFOSMIN: HCPCS

## 2022-02-07 PROCEDURE — 93306 TTE W/DOPPLER COMPLETE: CPT

## 2022-02-07 PROCEDURE — 63600175 PHARM REV CODE 636 W HCPCS: Performed by: INTERNAL MEDICINE

## 2022-02-07 RX ORDER — REGADENOSON 0.08 MG/ML
0.4 INJECTION, SOLUTION INTRAVENOUS ONCE
Status: COMPLETED | OUTPATIENT
Start: 2022-02-07 | End: 2022-02-07

## 2022-02-07 RX ADMIN — REGADENOSON 0.4 MG: 0.08 INJECTION, SOLUTION INTRAVENOUS at 10:02

## 2022-02-07 NOTE — PROGRESS NOTES
Please notify patient    ECHO revealed slightly worsening EF (45-50%)  Keep appt for stress test  Make sure he has follow up appt arranged    Thanks

## 2022-02-08 ENCOUNTER — TELEPHONE (OUTPATIENT)
Dept: CARDIOLOGY | Facility: CLINIC | Age: 67
End: 2022-02-08
Payer: MEDICARE

## 2022-02-08 NOTE — TELEPHONE ENCOUNTER
----- Message from MARK Fritz sent at 2/7/2022  1:46 PM CST -----  Please notify patient    ECHO revealed slightly worsening EF (45-50%)  Keep appt for stress test  Make sure he has follow up appt arranged    Thanks

## 2022-02-08 NOTE — TELEPHONE ENCOUNTER
Pt contacted about results, spoke with son verbalized understanding.          ----- Message from Kareem Valencia MD sent at 2/7/2022 10:49 PM CST -----  ABIs showed severe PAD  LE arterial US ordered  [facility] stress test normal

## 2022-02-14 ENCOUNTER — HOSPITAL ENCOUNTER (OUTPATIENT)
Dept: CARDIOLOGY | Facility: HOSPITAL | Age: 67
Discharge: HOME | End: 2022-02-14
Attending: INTERNAL MEDICINE
Payer: MEDICARE

## 2022-02-14 VITALS — HEIGHT: 76 IN | BODY MASS INDEX: 23.75 KG/M2 | WEIGHT: 195 LBS

## 2022-02-14 DIAGNOSIS — I73.9 PAD (PERIPHERAL ARTERY DISEASE): ICD-10-CM

## 2022-02-14 LAB
LEFT ANT TIBIAL SYS PSV: 0 CM/S
LEFT CFA PSV: 166 CM/S
LEFT PERONEAL SYS PSV: 45 CM/S
LEFT POPLITEAL PSV: 50 CM/S
LEFT POST TIBIAL SYS PSV: 85 CM/S
LEFT PROFUNDA SYS PSV: 504 CM/S
LEFT SUPER FEMORAL DIST SYS PSV: 0 CM/S
LEFT SUPER FEMORAL MID SYS PSV: 0 CM/S
LEFT SUPER FEMORAL OSTIAL SYS PSV: 203 CM/S
LEFT SUPER FEMORAL PROX SYS PSV: 0 CM/S
LEFT TIB/PER TRUNK SYS PSV: 54 CM/S
OHS CV LEFT LOWER EXTREMITY ABI (NO CALC): 0.54
OHS CV RIGHT ABI LOWER EXTREMITY (NO CALC): 0.54
RIGHT ANT TIBIAL SYS PSV: 0 CM/S
RIGHT CFA PSV: 371 CM/S
RIGHT EXTERNAL ILLIAC PSV: 217 CM/S
RIGHT PERONEAL SYS PSV: 35 CM/S
RIGHT POPLITEAL PSV: 52 CM/S
RIGHT POST TIBIAL SYS PSV: 105 CM/S
RIGHT PROFUNDA SYS PSV: 480 CM/S
RIGHT SUPER FEMORAL DIST SYS PSV: 37 CM/S
RIGHT SUPER FEMORAL MID SYS PSV: 0 CM/S
RIGHT SUPER FEMORAL OSTIAL SYS PSV: 234 CM/S
RIGHT SUPER FEMORAL PROX SYS PSV: 0 CM/S
RIGHT TIB/PER TRUNK SYS PSV: 66 CM/S

## 2022-02-14 PROCEDURE — 93925 LOWER EXTREMITY STUDY: CPT

## 2022-02-14 PROCEDURE — 93925 LOWER EXTREMITY STUDY: CPT | Mod: 26,,, | Performed by: INTERNAL MEDICINE

## 2022-02-14 PROCEDURE — 93925 CV US DOPPLER ARTERIAL LEGS BILATERAL (CUPID ONLY): ICD-10-PCS | Mod: 26,,, | Performed by: INTERNAL MEDICINE

## 2022-02-15 ENCOUNTER — TELEPHONE (OUTPATIENT)
Dept: VASCULAR SURGERY | Facility: CLINIC | Age: 67
End: 2022-02-15
Payer: MEDICARE

## 2022-02-15 ENCOUNTER — TELEPHONE (OUTPATIENT)
Dept: CARDIOLOGY | Facility: CLINIC | Age: 67
End: 2022-02-15
Payer: MEDICARE

## 2022-02-15 RX ORDER — LOSARTAN POTASSIUM 50 MG/1
50 TABLET ORAL DAILY
Qty: 30 TABLET | Refills: 11 | Status: SHIPPED | OUTPATIENT
Start: 2022-02-15 | End: 2022-02-17 | Stop reason: SDUPTHER

## 2022-02-15 NOTE — TELEPHONE ENCOUNTER
Contacted pt son about medication changes and did not know that pt wanted to be discharged from home health

## 2022-02-15 NOTE — TELEPHONE ENCOUNTER
----- Message from Lorena Luna sent at 2/15/2022 11:49 AM CST -----  Contact: Kiran/Son  Patients son is calling to speak with the nurse regarding rescheduling the 2/16/22 appointment. Son is requesting patient seen next Monday or the following Monday and at [facility]. Please give Mr Beck a call back at [phone] today when possible.   Thanks,  RP

## 2022-02-15 NOTE — TELEPHONE ENCOUNTER
----- Message from Jaimie Rm MA sent at 2/15/2022 12:53 PM CST -----  Regarding: Tim from home health.  I spoke with Elias from [facility].    Patient was up for recertification today and he wanted to be discharged so she discharged him.    Nurse called to inform of some issues.     For the past two months patients HR has been in the 40's. Today it ranged from 44-58 even with walking. It went up to 58 with a 50yd walk and dropped almost immediately down to 53 after sitting.  She asked if you think it would be a good idea to be able to cut his metoprolol again to a lower dose.    She also stated that his blood pressure when she arrived was 170/80 and when she left it was 150/70. He stated he drinks about 5 cups of coffee a day and said it was probably the caffeine causing the blood pressure. Nurse recommended that he stay no more than 2 cups of coffee a day.    She just wanted you to be aware of the situation and as about the medication.    Please advise,   Thank you.

## 2022-02-17 RX ORDER — LOSARTAN POTASSIUM 50 MG/1
50 TABLET ORAL DAILY
Qty: 30 TABLET | Refills: 11 | Status: SHIPPED | OUTPATIENT
Start: 2022-02-17 | End: 2022-03-08 | Stop reason: SDUPTHER

## 2022-02-21 ENCOUNTER — OFFICE VISIT (OUTPATIENT)
Dept: CARDIOLOGY | Facility: CLINIC | Age: 67
End: 2022-02-21
Payer: MEDICARE

## 2022-02-21 ENCOUNTER — OFFICE VISIT (OUTPATIENT)
Dept: PODIATRY | Facility: CLINIC | Age: 67
End: 2022-02-21
Payer: MEDICARE

## 2022-02-21 VITALS
WEIGHT: 206.38 LBS | OXYGEN SATURATION: 98 % | DIASTOLIC BLOOD PRESSURE: 68 MMHG | SYSTOLIC BLOOD PRESSURE: 138 MMHG | HEART RATE: 66 BPM | BODY MASS INDEX: 25.12 KG/M2

## 2022-02-21 VITALS — BODY MASS INDEX: 25.09 KG/M2 | HEIGHT: 76 IN | WEIGHT: 206 LBS

## 2022-02-21 DIAGNOSIS — E11.621 TYPE 2 DIABETES MELLITUS WITH FOOT ULCER, WITHOUT LONG-TERM CURRENT USE OF INSULIN: ICD-10-CM

## 2022-02-21 DIAGNOSIS — E11.59 HYPERTENSION ASSOCIATED WITH DIABETES: ICD-10-CM

## 2022-02-21 DIAGNOSIS — B35.1 ONYCHOMYCOSIS OF MULTIPLE TOENAILS WITH TYPE 2 DIABETES MELLITUS AND PERIPHERAL NEUROPATHY: ICD-10-CM

## 2022-02-21 DIAGNOSIS — I73.9 PAD (PERIPHERAL ARTERY DISEASE): ICD-10-CM

## 2022-02-21 DIAGNOSIS — I71.40 ABDOMINAL AORTIC ANEURYSM (AAA) WITHOUT RUPTURE: Primary | ICD-10-CM

## 2022-02-21 DIAGNOSIS — I15.2 HYPERTENSION ASSOCIATED WITH DIABETES: ICD-10-CM

## 2022-02-21 DIAGNOSIS — E11.69 ONYCHOMYCOSIS OF MULTIPLE TOENAILS WITH TYPE 2 DIABETES MELLITUS AND PERIPHERAL NEUROPATHY: ICD-10-CM

## 2022-02-21 DIAGNOSIS — Z72.0 TOBACCO ABUSE: ICD-10-CM

## 2022-02-21 DIAGNOSIS — M79.609 PAIN IN EXTREMITY, UNSPECIFIED EXTREMITY: Primary | ICD-10-CM

## 2022-02-21 DIAGNOSIS — E11.42 ONYCHOMYCOSIS OF MULTIPLE TOENAILS WITH TYPE 2 DIABETES MELLITUS AND PERIPHERAL NEUROPATHY: ICD-10-CM

## 2022-02-21 DIAGNOSIS — E78.5 HYPERLIPIDEMIA ASSOCIATED WITH TYPE 2 DIABETES MELLITUS: Chronic | ICD-10-CM

## 2022-02-21 DIAGNOSIS — E11.59 HYPERTENSION ASSOCIATED WITH DIABETES: Chronic | ICD-10-CM

## 2022-02-21 DIAGNOSIS — E11.621 TYPE 2 DIABETES MELLITUS WITH FOOT ULCER, WITHOUT LONG-TERM CURRENT USE OF INSULIN: Chronic | ICD-10-CM

## 2022-02-21 DIAGNOSIS — E11.69 HYPERLIPIDEMIA ASSOCIATED WITH TYPE 2 DIABETES MELLITUS: Chronic | ICD-10-CM

## 2022-02-21 DIAGNOSIS — I15.2 HYPERTENSION ASSOCIATED WITH DIABETES: Chronic | ICD-10-CM

## 2022-02-21 DIAGNOSIS — R94.39 ABNORMAL NUCLEAR STRESS TEST: ICD-10-CM

## 2022-02-21 DIAGNOSIS — I72.3 ANEURYSM OF LEFT ILIAC ARTERY: ICD-10-CM

## 2022-02-21 DIAGNOSIS — E11.42 DM TYPE 2 WITH DIABETIC PERIPHERAL NEUROPATHY: Primary | ICD-10-CM

## 2022-02-21 DIAGNOSIS — L97.509 TYPE 2 DIABETES MELLITUS WITH FOOT ULCER, WITHOUT LONG-TERM CURRENT USE OF INSULIN: ICD-10-CM

## 2022-02-21 DIAGNOSIS — L97.509 TYPE 2 DIABETES MELLITUS WITH FOOT ULCER, WITHOUT LONG-TERM CURRENT USE OF INSULIN: Chronic | ICD-10-CM

## 2022-02-21 DIAGNOSIS — I50.32 CHRONIC DIASTOLIC (CONGESTIVE) HEART FAILURE: ICD-10-CM

## 2022-02-21 PROCEDURE — 11721 DEBRIDE NAIL 6 OR MORE: CPT | Mod: Q8,S$PBB,, | Performed by: PODIATRIST

## 2022-02-21 PROCEDURE — 11721 DEBRIDE NAIL 6 OR MORE: CPT | Mod: Q8,PBBFAC | Performed by: PODIATRIST

## 2022-02-21 PROCEDURE — 99999 PR PBB SHADOW E&M-EST. PATIENT-LVL IV: ICD-10-PCS | Mod: PBBFAC,,, | Performed by: PODIATRIST

## 2022-02-21 PROCEDURE — 99215 OFFICE O/P EST HI 40 MIN: CPT | Mod: S$PBB,,, | Performed by: INTERNAL MEDICINE

## 2022-02-21 PROCEDURE — 99999 PR PBB SHADOW E&M-EST. PATIENT-LVL III: ICD-10-PCS | Mod: PBBFAC,,, | Performed by: INTERNAL MEDICINE

## 2022-02-21 PROCEDURE — 99214 OFFICE O/P EST MOD 30 MIN: CPT | Mod: PBBFAC,27 | Performed by: PODIATRIST

## 2022-02-21 PROCEDURE — 99204 PR OFFICE/OUTPT VISIT, NEW, LEVL IV, 45-59 MIN: ICD-10-PCS | Mod: 25,S$PBB,, | Performed by: PODIATRIST

## 2022-02-21 PROCEDURE — 99999 PR PBB SHADOW E&M-EST. PATIENT-LVL IV: CPT | Mod: PBBFAC,,, | Performed by: PODIATRIST

## 2022-02-21 PROCEDURE — 99999 PR PBB SHADOW E&M-EST. PATIENT-LVL III: CPT | Mod: PBBFAC,,, | Performed by: INTERNAL MEDICINE

## 2022-02-21 PROCEDURE — 99215 PR OFFICE/OUTPT VISIT, EST, LEVL V, 40-54 MIN: ICD-10-PCS | Mod: S$PBB,,, | Performed by: INTERNAL MEDICINE

## 2022-02-21 PROCEDURE — 99204 OFFICE O/P NEW MOD 45 MIN: CPT | Mod: 25,S$PBB,, | Performed by: PODIATRIST

## 2022-02-21 PROCEDURE — 99213 OFFICE O/P EST LOW 20 MIN: CPT | Mod: PBBFAC,25 | Performed by: INTERNAL MEDICINE

## 2022-02-21 PROCEDURE — 11721 PR DEBRIDEMENT OF NAILS, 6 OR MORE: ICD-10-PCS | Mod: Q8,S$PBB,, | Performed by: PODIATRIST

## 2022-02-21 RX ORDER — ASPIRIN 81 MG/1
81 TABLET ORAL DAILY
Start: 2022-02-21

## 2022-02-21 RX ORDER — CILOSTAZOL 50 MG/1
50 TABLET ORAL 2 TIMES DAILY
Qty: 60 TABLET | Refills: 11 | Status: SHIPPED | OUTPATIENT
Start: 2022-02-21 | End: 2022-03-08 | Stop reason: SDUPTHER

## 2022-02-21 NOTE — PROGRESS NOTES
PODIATRIC MEDICINE AND SURGERY     CHIEF COMPLAINT  Chief Complaint   Patient presents with    Diabetic Foot Exam     Diabetic routine exam, cracking in skin on both heels, b/l great toe callus at the distal aspect, neuropathy symptoms, 0 pain, diabetic, wears tennis and socks, last seen PCP Dr. Michelle on 02/01/22         HPI    SUBJECTIVE: Lamine Fong is a 66 y.o. male who  has a past medical history of Alcohol abuse, Carotid artery occlusion, COPD (chronic obstructive pulmonary disease), Coronary artery disease, Depression, Diabetes mellitus, Hypertension, Hypokalemia, Hypomagnesemia, and SHANELLE (obstructive sleep apnea). Laminepresents to clinic for high risk diabetic foot exam and care.  Lamine admits numbness, burning, and/or tingling sensations in their feet. Patient admits to painful toenails aggravated by increased weight bearing, shoe gear, and pressure. States pain is relieved with routine debridements. Patient has no other pedal complaints at this time.    This patient has documented high risk feet requiring routine maintenance secondary to diabetes mellitis and those secondary complications of diabetes, as mentioned.      HgA1c:   Hemoglobin A1C   Date Value Ref Range Status   12/05/2021 5.5 4.6 - 6.2 % Final   10/02/2021 5.9 (H) 0.0 - 5.6 % Final     Comment:     Reference Interval:  5.0 - 5.6 Normal   5.7 - 6.4 High Risk   > 6.5 Diabetic      Hgb A1c results are standardized based on the (NGSP) National   Glycohemoglobin Standardization Program.      Hemoglobin A1C levels are related to mean serum/plasma glucose   during the preceding 2-3 months.        06/29/2020 6.0 4.6 - 6.2 % Final         [facility]  Past Medical History:   Diagnosis Date    Alcohol abuse     Carotid artery occlusion     COPD (chronic obstructive pulmonary disease)     Coronary artery disease     Depression     Diabetes mellitus     Hypertension     Hypokalemia     Hypomagnesemia     SHANELLE (obstructive sleep apnea)       Patient Active Problem List   Diagnosis    Abdominal aortic aneurysm    Aneurysm of left iliac artery    Cholelithiasis without cholecystitis    Hypertension associated with diabetes    Type 2 diabetes mellitus with foot ulcer, without long-term current use of insulin    Bradycardia    History of alcohol abuse    Emphysema of lung    Neuropathy    Orthostatic syncope    Hyperlipidemia associated with type 2 diabetes mellitus    Bilateral carotid artery stenosis    Chronic diastolic (congestive) heart failure    Postural dizziness with near syncope    Tobacco abuse    Onychomycosis of multiple toenails with type 2 diabetes mellitus and peripheral neuropathy    Sun-damaged skin    Abnormal nuclear stress test    PAD (peripheral artery disease)       MEDS  Current Outpatient Medications on File Prior to Visit   Medication Sig Dispense Refill    amLODIPine (NORVASC) 10 MG tablet Take 1 tablet (10 mg total) by mouth once daily. 90 tablet 1    BLOOD PRESSURE CUFF Misc 1 each by Misc.(Non-Drug; Combo Route) route 2 (two) times a day. 1 each 0    blood sugar diagnostic Strp To check BG 2 times daily, to use with insurance preferred meter 100 each 3    blood-glucose meter kit To check BG 2 times daily, to use with insurance preferred meter 1 each 0    citalopram (CELEXA) 20 MG tablet Take 1 tablet (20 mg total) by mouth once daily. 90 tablet 3    folic acid (FOLVITE) 1 MG tablet Take 1 tablet (1 mg total) by mouth once daily. 30 tablet 11    gabapentin (NEURONTIN) 300 MG capsule Take 3 capsules (900 mg total) by mouth every evening. 90 capsule 11    lancets Misc To check BG 2 times daily, to use with insurance preferred meter 100 each 4    losartan (COZAAR) 50 MG tablet Take 1 tablet (50 mg total) by mouth once daily. 30 tablet 11    metFORMIN (GLUCOPHAGE) 1000 MG tablet Take 1 tablet (1,000 mg total) by mouth 2 (two) times daily with meals. 180 tablet 1    simvastatin (ZOCOR) 20 MG tablet  "Take 1 tablet (20 mg total) by mouth every evening.      TYLENOL 325 mg tablet Take 650 mg by mouth every 6 (six) hours as needed.      VITAMIN B-1, MONONITRATE, 100 mg Tab Take 100 mg by mouth every morning.      VITAMIN B-12 1000 MCG tablet Take 1,000 mcg by mouth every 7 days.       No current facility-administered medications on file prior to visit.       PSH     Past Surgical History:   Procedure Laterality Date    HERNIA REPAIR      umbilical        ALL  Review of patient's allergies indicates:  No Known Allergies    SOC     Social History     Tobacco Use    Smoking status: Current Every Day Smoker     Packs/day: 1.00     Years: 25.00     Pack years: 25.00     Types: Vaping with nicotine    Smokeless tobacco: Current User   Substance Use Topics    Alcohol use: Yes     Comment: 3-4 beers daily and 1/2 pint of vodka daily    Drug use: No         Family HX    Family History   Problem Relation Age of Onset    Diabetes Mellitus Father             REVIEW OF SYSTEMS  General: Denies any fever or chills  Chest: Denies shortness of breath, wheezing, coughing, or sputum production  Heart: Denies chest pain.  As noted above and per history of current illness above, otherwise negative in the remainder of the 14 systems.     PHYSICAL EXAM  Vitals:    02/21/22 1518   Weight: 93.4 kg (206 lb)   Height: 6' 4" (1.93 m)   PainSc: 0-No pain       GEN:  This patient is well-developed, well-nourished and appears stated age, well-oriented to person, place and time, and cooperative and pleasant on today's visit.      LOWER EXTREMITY    VASCULAR  DP pedal pulse 0/4 RIGHT, LEFT0/4   PT pedal pulse 0/4 RIGHT, LEFT0/4  Capillary refill time immediate to the toes.   Feet are warm to the touch. Skin temperature warm to warm from proximally to distally   There are no varicosities, telangiectasias noted to bilateral foot and ankle regions.   There are no ecchymoses noted to bilateral foot and ankle regions.   There is no gross " lower extremity edema.    DERMATOLOGIC  Skin moist with healthy texture and turgor.  Thickened, dystrophic, elongated, discolored toenails with subungal debris 1-5 b/l   There is small ischemic  There is no interdigital maceration.   There are no hyperkeratotic lesions noted to feet.    NEUROLOGIC  Protective sensation intact at 3/10 sites upon examination with Easton Weinsten 5.07 g monofilament.  Propioception intact at 1st MTPJ b/l.  Achilles and patellar deep tendon reflexes intact  Babinski reflex absent    ORTHOPEDIC/BIOMECHANICAL  No symptomatic structural abnormalities noted. Muscle strength is 5/5 for foot inverters, everters, plantarflexors, and dorsiflexors. Muscle tone is normal.  I  nspection/palpation of bone, joints and muscles unremarkable.    ASSESSMENT  DM type 2 with diabetic peripheral neuropathy  -     DIABETIC SHOES FOR HOME USE    Type 2 diabetes mellitus with foot ulcer, without long-term current use of insulin  -     Ambulatory referral/consult to Podiatry  -     DIABETIC SHOES FOR HOME USE    Onychomycosis of multiple toenails with type 2 diabetes mellitus and peripheral neuropathy  -     Ambulatory referral/consult to Podiatry    PAD (peripheral artery disease)        PLAN  -The patient was examined and evaulated  -Discuss presenting problems, etiology, pathologic processes and management options with patient today.   -I counseled the patient on their conditions, their implications and medical management. An in depth discussion on diabetic management, risk prevention, amputation prevention verbally and provided educational literature in written format.  -With patient's permission, the elongated onychomycotic toenails, as outlined in the physical examination, were sharply debrided/trimmed with a double action nail nipper to their soft tissue attachment. If indicated, the nails were then smoothed down in thickness with an Claridge board to facilitate in further debridement removing all  offending nail and subungual debris. Patient relates relief following the procedure.   -local wound care to open wound on b.l hallux antibiotic ointment once daily until healed  DM- shoe Rx with custom inserts Rx/d       I counseled the patient on his/her Diabetic Mellitus regarding today's clinical examination and objection findings. We did also discuss recent medication changes, pertinent labs and imaging evaluations and other medical consultation notes and progress notes. Greater than 50% of this visit was spent on counseling and coordination of care. Greater than 20 minutes of this appt. was spent on education about the diabetic foot, in relation to PVD and/or neuropathy, and the prevention of limb loss. Patient received these instructions in written format and discussed verbally.     Comprehensive in depth discussion provided in written format in regards to diabetic/at risk foot health and amputation prevention. Summary as noted below:  1. Anti-diabetic medications should be taken regularly to prevent complications.  2. Feet should be washed daily.  3. Lukewarm water should be used to wash feet.   4. The temperature of the water should be checked before washing feet.   5. Feet should be dried completely after washing and in between toes.  6. Talcum powder (Zeasorb ( should be used to keep the areas betweent he toes dry.   7. Lotion or moisturizing cream should be applied to the fet daily to prevent dryness of the skin. Examples provided to patient OTC.  8. Lotion should not be applied between the toes.  9. Socks should be changed daily. I recommend white socks in order to be able to note any drainage or bleeding if injury occurs.  10. Toenails should be trimmed straight across (if applicable).  11. Feet should be inspected at least once a day.  12. Diabetic patients should wear comfortable shoes. Examples provided.   13. The inside of the shoes should be inspected before wearing them.  14. Diabetic patients  should not walk barefoot.   15. Diabetic patients should consult their podiatrist if their feet have redness, blisters, cuts, or wounds.       Shoe gear is inspected and wear and proper fit/type. If applicable/covered, pt was provided prescription for diabetic shoes and/or custom molded inserts.  Shoe Fitting recommendations:  1. Have foot measured while standing.  2. Try on both shoes and walk around to be sure shoes are comfortable.  3. Allow at least a thumbs width of space at end of longest toe in shoes. Make sure you can wiggle toes inside shoe.  4. Try on shoes at end of the day due to foot swelling.   5. Look for shoes with a round and wide flexible  toe box, extra cushion, and thick/stiff heel. Check inside shoes and avoid thick seams.   6. Breathable anti-microbial or moisture wicking fabric is preferred. Leather uppers are optimal.     Patient is reminded of the importance of good nutrition and blood sugar control to help prevent podiatric complications of diabetes. I discussed proper blood glucose control and co-management with diabetes education team and patient's PCP and/or Endocrinology Advanced Practice Provider.  Patient  will continue to monitor the areas daily, inspect feet, wear protective shoe gear when ambulatory, moisturizer to maintain skin integrity.    Follow up as scheduled below or sooner if any problem arises with foot and/or ankle.     Disclaimer: This note was partially prepared using a voice recognition system and is likely to have sound alike errors within the text.        Future Appointments   Date Time Provider Department Center   2/21/2022  4:40 PM Kareem Valencia MD [facility]   2/28/2022 11:00 AM Anni Estrada MD [facility] DERM [facility]   3/7/2022  9:00 AM Carlitos Michelle MD [facility] IM [facility]   3/9/2022  9:45 AM Bharat Fletcher IV, MD [facility] VASSGY [facility]   3/23/2022  1:00 PM Mayank Purvis MD [facility] PULMSVC [facility]   4/26/2022  9:20 AM Kareem Valencia MD [facility] CARDIO  [place]   5/24/2022  3:00 PM Kerry Cabral DPM [facility] POD [place]       Report Electronically Signed By:     Kerry Cabral DPM   Podiatry  [facility]  2/21/2022

## 2022-02-21 NOTE — H&P (VIEW-ONLY)
Subjective:   Patient ID:  Lamine Fong is a 66 y.o. male who presents for follow up of No chief complaint on file.      65 yo male came in for f/u. Accompanied with his son.  PMH orthostatic hypotension. DM > 5 yrs former heavy drinker and smoker, coronary atherosclerosis. PAD, AAA left iliac aneurysm, and carotid artery Dz.  In , c/o+ postaural dizziness when taking amlodipine, metorpolol and clonidine.   Now on amlodipine only for HTN and dizziness resolved.   Drinking issue on and off for 30 years. Pint vodka and beers daily, Off alcohol for 30 days,   Smoker 40 yrs 1ppd and quit 2 months ago, now vaping and patch   echo biv function;  Carotid US There is less than 50% diameter reduction on the right. There is 50-69% stenosis within the left proximal and mid ICA  Brain mild microvascular Dz   abd CT Coronary and aortic calcific atherosclerosis are noted. AAA 4.6 cm infra renal   Today EKG NSR    Good appetite, weight gain   No SOB chest pain dizziness faint   Claudication if walking a lot    Interval history   LE arterial US showed bilaetral SAF pcclued and ABIs 0.54  MPI showed possible apical ischemia and normal EF  Leg calf muscle pain after walking for 10 min. No rest pain. The feet normal color and warm. PT DP pulse weak  Off Metoprolol deu to bradycardia,.   Added lipitor        Past Medical History:   Diagnosis Date    Alcohol abuse     Carotid artery occlusion     COPD (chronic obstructive pulmonary disease)     Coronary artery disease     Depression     Diabetes mellitus     Hypertension     Hypokalemia     Hypomagnesemia     SHANELLE (obstructive sleep apnea)        Past Surgical History:   Procedure Laterality Date    HERNIA REPAIR      umbilical       Social History     Tobacco Use    Smoking status: Current Every Day Smoker     Packs/day: 1.00     Years: 25.00     Pack years: 25.00     Types: Vaping with nicotine    Smokeless tobacco: Current User   Substance  Use Topics    Alcohol use: Yes     Comment: 3-4 beers daily and 1/2 pint of vodka daily    Drug use: No       Family History   Problem Relation Age of Onset    Diabetes Mellitus Father          Review of Systems   Constitutional: Positive for malaise/fatigue. Negative for decreased appetite, diaphoresis, fever and night sweats.   HENT: Negative for nosebleeds.    Eyes: Negative for blurred vision and double vision.   Cardiovascular: Positive for claudication and dyspnea on exertion. Negative for chest pain, irregular heartbeat, leg swelling, near-syncope, orthopnea, palpitations, paroxysmal nocturnal dyspnea and syncope.   Respiratory: Negative for cough, shortness of breath, sleep disturbances due to breathing, snoring, sputum production and wheezing.    Endocrine: Negative for cold intolerance and polyuria.   Hematologic/Lymphatic: Does not bruise/bleed easily.   Skin: Negative for rash.   Musculoskeletal: Positive for back pain. Negative for falls, joint pain, joint swelling and neck pain.   Gastrointestinal: Negative for abdominal pain, heartburn, nausea and vomiting.   Genitourinary: Negative for dysuria, frequency and hematuria.   Neurological: Positive for dizziness. Negative for difficulty with concentration, focal weakness, headaches, light-headedness, numbness, seizures and weakness.   Psychiatric/Behavioral: Negative for depression, memory loss and substance abuse. The patient does not have insomnia.    Allergic/Immunologic: Negative for HIV exposure and hives.       Objective:   Physical Exam  HENT:      Head: Normocephalic.   Eyes:      Pupils: Pupils are equal, round, and reactive to light.   Neck:      Thyroid: No thyromegaly.      Vascular: Normal carotid pulses. No carotid bruit or JVD.   Cardiovascular:      Rate and Rhythm: Normal rate and regular rhythm.  No extrasystoles are present.     Chest Wall: PMI is not displaced.      Pulses: Normal pulses.      Heart sounds: Normal heart sounds. No  murmur heard.    No gallop. No S3 sounds.   Pulmonary:      Effort: No respiratory distress.      Breath sounds: Normal breath sounds. No stridor.   Abdominal:      General: Bowel sounds are normal.      Palpations: Abdomen is soft.      Tenderness: There is no abdominal tenderness. There is no rebound.   Musculoskeletal:         General: Normal range of motion.   Skin:     Findings: No rash.   Neurological:      Mental Status: He is alert and oriented to person, place, and time.   Psychiatric:         Behavior: Behavior normal.         Lab Results   Component Value Date    CHOL 123 10/02/2021    CHOL 142 06/29/2020     Lab Results   Component Value Date    HDL 47 10/02/2021    HDL 41 06/29/2020     Lab Results   Component Value Date    LDLCALC 63.6 10/02/2021    LDLCALC 77 06/29/2020     Lab Results   Component Value Date    TRIG 62 10/02/2021    TRIG 118 06/29/2020     Lab Results   Component Value Date    CHOLHDL 38.2 10/02/2021    CHOLHDL 3.5 06/29/2020       Chemistry        Component Value Date/Time     12/22/2021 1218    K 4.6 12/22/2021 1218     12/22/2021 1218    CO2 29 12/22/2021 1218    BUN 10 12/22/2021 1218    CREATININE 0.8 12/22/2021 1218    GLU 77 12/22/2021 1218        Component Value Date/Time    CALCIUM 10.0 12/22/2021 1218    ALKPHOS 92 12/22/2021 1218    AST 19 12/22/2021 1218    ALT 20 12/22/2021 1218    BILITOT 0.4 12/22/2021 1218    ESTGFRAFRICA >60.0 12/22/2021 1218    EGFRNONAA >60.0 12/22/2021 1218          Lab Results   Component Value Date    HGBA1C 5.5 12/05/2021     Lab Results   Component Value Date    TSH 1.750 10/03/2021     No results found for: INR, PROTIME  Lab Results   Component Value Date    WBC 5.88 10/09/2021    HGB 13.8 (L) 10/09/2021    HCT 42.0 10/09/2021    MCV 82 10/09/2021     10/09/2021     BMP  Sodium   Date Value Ref Range Status   12/22/2021 137 136 - 145 mmol/L Final     Potassium   Date Value Ref Range Status   12/22/2021 4.6 3.5 - 5.1 mmol/L  Final     Chloride   Date Value Ref Range Status   12/22/2021 100 95 - 110 mmol/L Final     CO2   Date Value Ref Range Status   12/22/2021 29 23 - 29 mmol/L Final     BUN   Date Value Ref Range Status   12/22/2021 10 8 - 23 mg/dL Final     Creatinine   Date Value Ref Range Status   12/22/2021 0.8 0.5 - 1.4 mg/dL Final     Calcium   Date Value Ref Range Status   12/22/2021 10.0 8.7 - 10.5 mg/dL Final     Anion Gap   Date Value Ref Range Status   12/22/2021 8 8 - 16 mmol/L Final     eGFR if    Date Value Ref Range Status   12/22/2021 >60.0 >60 mL/min/1.73 m^2 Final     eGFR if non    Date Value Ref Range Status   12/22/2021 >60.0 >60 mL/min/1.73 m^2 Final     Comment:     Calculation used to obtain the estimated glomerular filtration  rate (eGFR) is the CKD-EPI equation.        BNP  @LABRCNTIP(BNP,BNPTRIAGEBLO)@  @LABRCNTIP(troponini)@  CrCl cannot be calculated (Patient's most recent lab result is older than the maximum 7 days allowed.).  No results found in the last 24 hours.  No results found in the last 24 hours.  No results found in the last 24 hours.    Assessment:      1. Abdominal aortic aneurysm (AAA) without rupture    2. Abnormal nuclear stress test    3. PAD (peripheral artery disease)    4. Aneurysm of left iliac artery    5. Chronic diastolic (congestive) heart failure    6. Hypertension associated with diabetes    7. Hyperlipidemia associated with type 2 diabetes mellitus    8. Type 2 diabetes mellitus with foot ulcer, without long-term current use of insulin    9. Tobacco abuse        Plan:   Arrange LHC with Dr. Simons  For PAD, add ASA 81mg and Pletal 50 mg bid  Advise exercise  Continue simvas, amlodipine and Losartan   rtc after the cath done  I have explained the risks, benefits, and alternatives of the procedure in detail with patient and family. The patient voices understanding and all questions have been addressed.  The patient agrees to proceed as  planned.

## 2022-02-25 ENCOUNTER — DOCUMENT SCAN (OUTPATIENT)
Dept: HOME HEALTH SERVICES | Facility: HOSPITAL | Age: 67
End: 2022-02-25
Payer: MEDICARE

## 2022-02-28 ENCOUNTER — HOSPITAL ENCOUNTER (OUTPATIENT)
Facility: HOSPITAL | Age: 67
Discharge: HOME | End: 2022-02-28
Attending: INTERNAL MEDICINE | Admitting: INTERNAL MEDICINE
Payer: MEDICARE

## 2022-02-28 VITALS
DIASTOLIC BLOOD PRESSURE: 63 MMHG | SYSTOLIC BLOOD PRESSURE: 163 MMHG | BODY MASS INDEX: 25.09 KG/M2 | HEIGHT: 76 IN | HEART RATE: 51 BPM | TEMPERATURE: 99 F | WEIGHT: 206 LBS | RESPIRATION RATE: 15 BRPM | OXYGEN SATURATION: 98 %

## 2022-02-28 DIAGNOSIS — R06.02 SOB (SHORTNESS OF BREATH): ICD-10-CM

## 2022-02-28 DIAGNOSIS — R55 POSTURAL DIZZINESS WITH NEAR SYNCOPE: ICD-10-CM

## 2022-02-28 DIAGNOSIS — R42 POSTURAL DIZZINESS WITH NEAR SYNCOPE: ICD-10-CM

## 2022-02-28 DIAGNOSIS — I50.32 CHRONIC DIASTOLIC (CONGESTIVE) HEART FAILURE: ICD-10-CM

## 2022-02-28 DIAGNOSIS — R94.39 ABNORMAL STRESS TEST: ICD-10-CM

## 2022-02-28 DIAGNOSIS — R94.39 ABNORMAL NUCLEAR STRESS TEST: Primary | ICD-10-CM

## 2022-02-28 LAB
ANION GAP SERPL CALC-SCNC: 9 MMOL/L (ref 8–16)
BASOPHILS # BLD AUTO: 0.08 K/UL (ref 0–0.2)
BASOPHILS NFR BLD: 0.9 % (ref 0–1.9)
BUN SERPL-MCNC: 19 MG/DL (ref 8–23)
CALCIUM SERPL-MCNC: 9.6 MG/DL (ref 8.7–10.5)
CATH EF QUANTITATIVE: 60 %
CHLORIDE SERPL-SCNC: 104 MMOL/L (ref 95–110)
CO2 SERPL-SCNC: 27 MMOL/L (ref 23–29)
CREAT SERPL-MCNC: 0.9 MG/DL (ref 0.5–1.4)
CTP QC/QA: YES
DIFFERENTIAL METHOD: ABNORMAL
EOSINOPHIL # BLD AUTO: 0.5 K/UL (ref 0–0.5)
EOSINOPHIL NFR BLD: 5.8 % (ref 0–8)
ERYTHROCYTE [DISTWIDTH] IN BLOOD BY AUTOMATED COUNT: 12.7 % (ref 11.5–14.5)
EST. GFR  (AFRICAN AMERICAN): >60 ML/MIN/1.73 M^2
EST. GFR  (NON AFRICAN AMERICAN): >60 ML/MIN/1.73 M^2
GLUCOSE SERPL-MCNC: 117 MG/DL (ref 70–110)
HCT VFR BLD AUTO: 42.1 % (ref 40–54)
HGB BLD-MCNC: 13.7 G/DL (ref 14–18)
IMM GRANULOCYTES # BLD AUTO: 0.02 K/UL (ref 0–0.04)
IMM GRANULOCYTES NFR BLD AUTO: 0.2 % (ref 0–0.5)
INR PPP: 0.9 (ref 0.8–1.2)
LYMPHOCYTES # BLD AUTO: 2 K/UL (ref 1–4.8)
LYMPHOCYTES NFR BLD: 22.2 % (ref 18–48)
MCH RBC QN AUTO: 28.4 PG (ref 27–31)
MCHC RBC AUTO-ENTMCNC: 32.5 G/DL (ref 32–36)
MCV RBC AUTO: 87 FL (ref 82–98)
MONOCYTES # BLD AUTO: 0.9 K/UL (ref 0.3–1)
MONOCYTES NFR BLD: 9.6 % (ref 4–15)
NEUTROPHILS # BLD AUTO: 5.6 K/UL (ref 1.8–7.7)
NEUTROPHILS NFR BLD: 61.3 % (ref 38–73)
NRBC BLD-RTO: 0 /100 WBC
PLATELET # BLD AUTO: 232 K/UL (ref 150–450)
PMV BLD AUTO: 9.2 FL (ref 9.2–12.9)
POCT GLUCOSE: 124 MG/DL (ref 70–110)
POTASSIUM SERPL-SCNC: 4.2 MMOL/L (ref 3.5–5.1)
PROTHROMBIN TIME: 10.2 SEC (ref 9–12.5)
RBC # BLD AUTO: 4.82 M/UL (ref 4.6–6.2)
SARS-COV-2 AG RESP QL IA.RAPID: NEGATIVE
SODIUM SERPL-SCNC: 140 MMOL/L (ref 136–145)
WBC # BLD AUTO: 9.08 K/UL (ref 3.9–12.7)

## 2022-02-28 PROCEDURE — 85025 COMPLETE CBC W/AUTO DIFF WBC: CPT | Performed by: INTERNAL MEDICINE

## 2022-02-28 PROCEDURE — 80048 BASIC METABOLIC PNL TOTAL CA: CPT | Performed by: INTERNAL MEDICINE

## 2022-02-28 PROCEDURE — 99152 MOD SED SAME PHYS/QHP 5/>YRS: CPT | Performed by: INTERNAL MEDICINE

## 2022-02-28 PROCEDURE — C1769 GUIDE WIRE: HCPCS | Performed by: INTERNAL MEDICINE

## 2022-02-28 PROCEDURE — 25500020 PHARM REV CODE 255: Performed by: INTERNAL MEDICINE

## 2022-02-28 PROCEDURE — 63600175 PHARM REV CODE 636 W HCPCS: Performed by: INTERNAL MEDICINE

## 2022-02-28 PROCEDURE — 93458 L HRT ARTERY/VENTRICLE ANGIO: CPT | Performed by: INTERNAL MEDICINE

## 2022-02-28 PROCEDURE — 93458 L HRT ARTERY/VENTRICLE ANGIO: CPT | Mod: 26,,, | Performed by: INTERNAL MEDICINE

## 2022-02-28 PROCEDURE — 99153 MOD SED SAME PHYS/QHP EA: CPT | Performed by: INTERNAL MEDICINE

## 2022-02-28 PROCEDURE — 25000003 PHARM REV CODE 250: Performed by: INTERNAL MEDICINE

## 2022-02-28 PROCEDURE — 93458 PR CATH PLACE/CORON ANGIO, IMG SUPER/INTERP,W LEFT HEART VENTRICULOGRAPHY: ICD-10-PCS | Mod: 26,,, | Performed by: INTERNAL MEDICINE

## 2022-02-28 PROCEDURE — 99152 PR MOD CONSCIOUS SEDATION, SAME PHYS, 5+ YRS, FIRST 15 MIN: ICD-10-PCS | Mod: ,,, | Performed by: INTERNAL MEDICINE

## 2022-02-28 PROCEDURE — 85610 PROTHROMBIN TIME: CPT | Performed by: INTERNAL MEDICINE

## 2022-02-28 PROCEDURE — 99152 MOD SED SAME PHYS/QHP 5/>YRS: CPT | Mod: ,,, | Performed by: INTERNAL MEDICINE

## 2022-02-28 PROCEDURE — C1894 INTRO/SHEATH, NON-LASER: HCPCS | Performed by: INTERNAL MEDICINE

## 2022-02-28 RX ORDER — HYDRALAZINE HYDROCHLORIDE 20 MG/ML
10 INJECTION INTRAMUSCULAR; INTRAVENOUS EVERY 6 HOURS PRN
Status: DISCONTINUED | OUTPATIENT
Start: 2022-02-28 | End: 2022-02-28 | Stop reason: HOSPADM

## 2022-02-28 RX ORDER — NAPROXEN SODIUM 220 MG/1
81 TABLET, FILM COATED ORAL ONCE
Status: COMPLETED | OUTPATIENT
Start: 2022-02-28 | End: 2022-02-28

## 2022-02-28 RX ORDER — ONDANSETRON 8 MG/1
8 TABLET, ORALLY DISINTEGRATING ORAL EVERY 8 HOURS PRN
Status: DISCONTINUED | OUTPATIENT
Start: 2022-02-28 | End: 2022-02-28 | Stop reason: HOSPADM

## 2022-02-28 RX ORDER — DIAZEPAM 5 MG/1
5 TABLET ORAL
Status: DISCONTINUED | OUTPATIENT
Start: 2022-02-28 | End: 2022-02-28 | Stop reason: HOSPADM

## 2022-02-28 RX ORDER — SODIUM CHLORIDE 9 MG/ML
INJECTION, SOLUTION INTRAVENOUS CONTINUOUS
Status: ACTIVE | OUTPATIENT
Start: 2022-02-28 | End: 2022-02-28

## 2022-02-28 RX ORDER — FENTANYL CITRATE 50 UG/ML
INJECTION, SOLUTION INTRAMUSCULAR; INTRAVENOUS
Status: DISCONTINUED | OUTPATIENT
Start: 2022-02-28 | End: 2022-02-28 | Stop reason: HOSPADM

## 2022-02-28 RX ORDER — DIPHENHYDRAMINE HCL 50 MG
50 CAPSULE ORAL ONCE
Status: COMPLETED | OUTPATIENT
Start: 2022-02-28 | End: 2022-02-28

## 2022-02-28 RX ORDER — MIDAZOLAM HYDROCHLORIDE 1 MG/ML
INJECTION, SOLUTION INTRAMUSCULAR; INTRAVENOUS
Status: DISCONTINUED | OUTPATIENT
Start: 2022-02-28 | End: 2022-02-28 | Stop reason: HOSPADM

## 2022-02-28 RX ORDER — LIDOCAINE HYDROCHLORIDE 20 MG/ML
INJECTION, SOLUTION EPIDURAL; INFILTRATION; INTRACAUDAL; PERINEURAL
Status: DISCONTINUED | OUTPATIENT
Start: 2022-02-28 | End: 2022-02-28 | Stop reason: HOSPADM

## 2022-02-28 RX ORDER — ACETAMINOPHEN 325 MG/1
650 TABLET ORAL EVERY 4 HOURS PRN
Status: DISCONTINUED | OUTPATIENT
Start: 2022-02-28 | End: 2022-02-28 | Stop reason: HOSPADM

## 2022-02-28 RX ADMIN — SODIUM CHLORIDE: 0.9 INJECTION, SOLUTION INTRAVENOUS at 08:02

## 2022-02-28 RX ADMIN — DIAZEPAM 5 MG: 5 TABLET ORAL at 08:02

## 2022-02-28 RX ADMIN — DIPHENHYDRAMINE HYDROCHLORIDE 50 MG: 50 CAPSULE ORAL at 08:02

## 2022-02-28 RX ADMIN — HYDRALAZINE HYDROCHLORIDE 10 MG: 20 INJECTION, SOLUTION INTRAMUSCULAR; INTRAVENOUS at 10:02

## 2022-02-28 RX ADMIN — ASPIRIN 81 MG CHEWABLE TABLET 81 MG: 81 TABLET CHEWABLE at 08:02

## 2022-02-28 NOTE — DISCHARGE INSTRUCTIONS
"Post-op Heart Catheterization    1. DIET: It is advisable for you to follow a diet that limits the intake of salt, sugar, saturated fats and cholesterol.     2. FOR THE NEXT 24 HOURS:   For the next 8 hours, you should be watched by a responsible adult. This person should make sure your condition is not getting worse.   Don't drink any alcohol for the next 24 hours.  Don't drive, operate dangerous machinery, or make important business or personal decisions during the next 24 hours.  Your healthcare provider may tell you not to take any medicine by mouth for pain or sleep in the next 4 hours. These medicines may react with the medicines you were given in the hospital. This could cause a much stronger response than usual.       3. ACTIVITY:                                Day of discharge:             NO vigorous activity, lifting or straining                                                   Day after discharge:         Avoid heavy lifting (up to 10 lbs)                                                                        The day after discharge you may shower, but avoid tub baths for 5 days                                                                         Wash site gently with soap and water        NO powder or lotion to your procedure site.                 Before you shower, remove dressing, apply bandaid if desired                                                                                                                                                                            2nd day after discharge:  Resume normal activities                                                                         Exercise program as instructed      4. WOUND CARE: It is not unusual to have a small amount of bruising appear in the groin area. It is also common to have a tender "knot" develop beneath the skin at the puncture site in the groin. This is scar tissue only and is not a cause for concern or alarm. This tender " "knot may take several weeks to fully resolve. The bruise will usually spread over several days. If the lump gets bigger, call you doctor immediately.    5. DISCOMFORT: For general discomfort at the puncture site, you may take 1 or 2 Acetaminophen (Tylenol) tablets every 4 hours as needed. (Do not take more than 4000 mg a day)                 6. CALL YOUR HEALTHCARE PROVIDER IF YOU START TO HAVE THE FOLLOWING SYMPTOMS:        1. Problems with the affected leg: Pain, discomfort, loss of warmth, numbness or tingling                                                                                                                            2. Problems at the groin site: Bleeding, pain that is sudden/sharp/persistent,                   swelling at site or a change in "lump" size, increased redness or drainage at                     puncture site                                                               3. High fever (101 degrees or higher)       4.  Drowsiness that doesn't get better       5. Weakness or dizziness that doesn't get better            6. Repeated vomiting        7. GO TO  THE EMERGENCY ROOM OR CALL 911 IF YOU HAVE: Chest pains or discomforts not relieved with 3 nitroglycerin doses (sublingual tablets or spray), numbness or severe pain or if your foot or leg becomes cold or discolored or uncontrolled bleeding from site (apply direct pressure above site).   "

## 2022-02-28 NOTE — Clinical Note
The DP pulses were detected with doppler bilaterally. The PT pulses were detected with doppler bilaterally.

## 2022-02-28 NOTE — INTERVAL H&P NOTE
The patient has been examined and the H&P has been reviewed:    I concur with the findings and no changes have occurred since H&P was written.    Procedure risks, benefits and alternative options discussed and understood by patient/family.          Active Hospital Problems    Diagnosis  POA    *Abnormal nuclear stress test [R94.39]  Yes    PAD (peripheral artery disease) [I73.9]  Yes    Bilateral carotid artery stenosis [I65.23]  Yes    Chronic diastolic (congestive) heart failure [I50.32]  Yes    Orthostatic syncope [I95.1]  Yes    Type 2 diabetes mellitus with foot ulcer, without long-term current use of insulin [E11.621, L97.509]  Yes     Chronic    Hypertension associated with diabetes [E11.59, I15.2]  Yes     Chronic    Aneurysm of left iliac artery [I72.3]  Yes    Abdominal aortic aneurysm [I71.4]  Yes    Tobacco abuse [Z72.0]  Yes    Hyperlipidemia associated with type 2 diabetes mellitus [E11.69, E78.5]  Yes     Chronic      Resolved Hospital Problems   No resolved problems to display.

## 2022-02-28 NOTE — Clinical Note
The catheter was inserted into the and was inserted over the wire into the ostium   right coronary artery. Hemodynamics were performed.  An angiography was performed of the right coronary arteries. Multiple views were taken. Inserted over [id number] guidewire

## 2022-02-28 NOTE — Clinical Note
130 ml of contrast were injected throughout the case. 0 mL of contrast was the total wasted during the case. 130 mL was the total amount used during the case.

## 2022-02-28 NOTE — OP NOTE
INPATIENT Operative Note         SUMMARY     Surgery Date: 2/28/2022     Surgeon(s) and Role:     * Jadiel Simons MD - Primary    ASSISTANT:none    Pre-op Diagnosis:  Chronic diastolic (congestive) heart failure [I50.32]Postural dizziness with near syncope [R42, R55]SOB (shortness of breath) [R06.02]      Post-op Diagnosis:  Chronic diastolic (congestive) heart failure [I50.32]Postural dizziness with near syncope [R42, R55]SOB (shortness of breath) [R06.02]    Procedure(s) (LRB):  CATHETERIZATION, HEART, LEFT (Left)    COMPLICATION:none    Anesthesia: RN IV Sedation    Findings/Key Components:  Occluded distal left dominant cx with collaterals from rca.   Non obs lmca and lad disease.   Normal lvf.    Estimated Blood Loss: < 50 ML.         SPECIMEN: NONE    Devices/Prostetics: None    PLAN:  Medical therapy   rf modification

## 2022-02-28 NOTE — Clinical Note
The catheter was inserted into the and was inserted over the wire into the left ventricle. The angiography was performed via power injection. The injected amount was 24 mL contrast at 12 mL/s. The PSI from the power injection was 800. Inserted over guidewire [id number]

## 2022-02-28 NOTE — Clinical Note
The catheter was inserted into the and was inserted over the wire into the ostial  left coronary artery. Hemodynamics were performed.  An angiography was performed of the left coronary arteries. Multiple views were taken. Inserted over [place] wire

## 2022-02-28 NOTE — PLAN OF CARE
6 hours of bedrest completed at 1630. R groin dressing clean dry and intact at time of discharge. No s/s of hematoma. PIV removed, catheter intact. Discharge teaching completed.Pt able to drink fluids and remains AAOx4 at time of discharge. Pt transported via w/c with family.

## 2022-02-28 NOTE — Clinical Note
The left radial was prepped. The site was clipped. The patient was draped. The patient was positioned supine.

## 2022-03-08 ENCOUNTER — OFFICE VISIT (OUTPATIENT)
Dept: CARDIOLOGY | Facility: CLINIC | Age: 67
End: 2022-03-08
Payer: MEDICARE

## 2022-03-08 VITALS
HEART RATE: 71 BPM | WEIGHT: 215.38 LBS | BODY MASS INDEX: 26.22 KG/M2 | OXYGEN SATURATION: 98 % | SYSTOLIC BLOOD PRESSURE: 156 MMHG | DIASTOLIC BLOOD PRESSURE: 68 MMHG

## 2022-03-08 DIAGNOSIS — I15.2 HYPERTENSION ASSOCIATED WITH DIABETES: Chronic | ICD-10-CM

## 2022-03-08 DIAGNOSIS — L97.509 TYPE 2 DIABETES MELLITUS WITH FOOT ULCER, WITHOUT LONG-TERM CURRENT USE OF INSULIN: Chronic | ICD-10-CM

## 2022-03-08 DIAGNOSIS — E11.621 TYPE 2 DIABETES MELLITUS WITH FOOT ULCER, WITHOUT LONG-TERM CURRENT USE OF INSULIN: Chronic | ICD-10-CM

## 2022-03-08 DIAGNOSIS — I73.9 PAD (PERIPHERAL ARTERY DISEASE): ICD-10-CM

## 2022-03-08 DIAGNOSIS — I25.118 CORONARY ARTERY DISEASE OF NATIVE ARTERY OF NATIVE HEART WITH STABLE ANGINA PECTORIS: Primary | ICD-10-CM

## 2022-03-08 DIAGNOSIS — E11.59 HYPERTENSION ASSOCIATED WITH DIABETES: Chronic | ICD-10-CM

## 2022-03-08 DIAGNOSIS — E78.5 HYPERLIPIDEMIA ASSOCIATED WITH TYPE 2 DIABETES MELLITUS: Chronic | ICD-10-CM

## 2022-03-08 DIAGNOSIS — I72.3 ANEURYSM OF LEFT ILIAC ARTERY: ICD-10-CM

## 2022-03-08 DIAGNOSIS — E11.69 HYPERLIPIDEMIA ASSOCIATED WITH TYPE 2 DIABETES MELLITUS: Chronic | ICD-10-CM

## 2022-03-08 PROCEDURE — 99999 PR PBB SHADOW E&M-EST. PATIENT-LVL III: CPT | Mod: PBBFAC,,, | Performed by: INTERNAL MEDICINE

## 2022-03-08 PROCEDURE — 99999 PR PBB SHADOW E&M-EST. PATIENT-LVL III: ICD-10-PCS | Mod: PBBFAC,,, | Performed by: INTERNAL MEDICINE

## 2022-03-08 PROCEDURE — 99214 PR OFFICE/OUTPT VISIT, EST, LEVL IV, 30-39 MIN: ICD-10-PCS | Mod: S$PBB,,, | Performed by: INTERNAL MEDICINE

## 2022-03-08 PROCEDURE — 99214 OFFICE O/P EST MOD 30 MIN: CPT | Mod: S$PBB,,, | Performed by: INTERNAL MEDICINE

## 2022-03-08 PROCEDURE — 99213 OFFICE O/P EST LOW 20 MIN: CPT | Mod: PBBFAC | Performed by: INTERNAL MEDICINE

## 2022-03-08 RX ORDER — CILOSTAZOL 100 MG/1
100 TABLET ORAL 2 TIMES DAILY
Qty: 60 TABLET | Refills: 11 | Status: SHIPPED | OUTPATIENT
Start: 2022-03-08 | End: 2023-04-13

## 2022-03-08 RX ORDER — METOPROLOL SUCCINATE 25 MG/1
25 TABLET, EXTENDED RELEASE ORAL DAILY
Qty: 30 TABLET | Refills: 5 | Status: SHIPPED | OUTPATIENT
Start: 2022-03-08 | End: 2023-10-30 | Stop reason: SDUPTHER

## 2022-03-08 RX ORDER — LOSARTAN POTASSIUM 100 MG/1
100 TABLET ORAL DAILY
Qty: 90 TABLET | Refills: 2 | Status: SHIPPED | OUTPATIENT
Start: 2022-03-08 | End: 2023-01-03 | Stop reason: SDUPTHER

## 2022-03-08 NOTE — PROGRESS NOTES
Subjective:   Patient ID:  Lamine Fong is a 66 y.o. male who presents for follow up of No chief complaint on file.      67 yo male came in for post cath f/u  PMH CAD h/o LHC showed distal LCX , orthostatic hypotension. DM > 5 yrs former heavy drinker and smoker, coronary atherosclerosis. PAD, AAA left iliac aneurysm, and carotid artery Dz.  In , c/o+ postaural dizziness when taking amlodipine, metorpolol and clonidine.   Now on amlodipine only for HTN and dizziness resolved.   Drinking issue on and off for 30 years. Pint vodka and beers daily, Off alcohol for 30 days,   Smoker 40 yrs 1ppd and quit 2 months ago, now vaping and patch   echo biv function;  Carotid US There is less than 50% diameter reduction on the right. There is 50-69% stenosis within the left proximal and mid ICA  Brain mild microvascular Dz   abd CT Coronary and aortic calcific atherosclerosis are noted. AAA 4.6 cm infra renal   Today EKG NSR    Good appetite, weight gain   No SOB chest pain dizziness faint   Claudication if walking a lot     LE arterial US showed bilaetral SAF pcclued and ABIs 0.54  MPI showed possible apical ischemia and normal EF  Leg calf muscle pain after walking for 10 min. No rest pain. The feet normal color and warm. PT DP pulse weak  Off Metoprolol deu to bradycardia,.   Added lipitor    Interval history  H/o LHC showed distal LCx 100% lesion and collateral circulation to LPDA. Continue medical Rx.  PAD with exertional claudication.  Right groin stable  No chest pain, leg swelling         Past Medical History:   Diagnosis Date    Alcohol abuse     Carotid artery occlusion     COPD (chronic obstructive pulmonary disease)     Coronary artery disease     Depression     Diabetes mellitus     Hypertension     Hypokalemia     Hypomagnesemia     SHANELLE (obstructive sleep apnea)        Past Surgical History:   Procedure Laterality Date    HERNIA REPAIR      umbilical    LEFT HEART  CATHETERIZATION Left 2/28/2022    Procedure: CATHETERIZATION, HEART, LEFT;  Surgeon: Jadiel Simons MD;  Location[facility] CATH LAB;  Service: Cardiology;  Laterality: Left;       Social History     Tobacco Use    Smoking status: Current Every Day Smoker     Packs/day: 1.00     Years: 25.00     Pack years: 25.00     Types: Vaping with nicotine    Smokeless tobacco: Current User   Substance Use Topics    Alcohol use: Yes     Comment: 3-4 beers daily and 1/2 pint of vodka daily    Drug use: No       Family History   Problem Relation Age of Onset    Diabetes Mellitus Father          Review of Systems   Constitutional: Positive for malaise/fatigue. Negative for decreased appetite, diaphoresis, fever and night sweats.   HENT: Negative for nosebleeds.    Eyes: Negative for blurred vision and double vision.   Cardiovascular: Positive for claudication and dyspnea on exertion. Negative for chest pain, irregular heartbeat, leg swelling, near-syncope, orthopnea, palpitations, paroxysmal nocturnal dyspnea and syncope.   Respiratory: Negative for cough, shortness of breath, sleep disturbances due to breathing, snoring, sputum production and wheezing.    Endocrine: Negative for cold intolerance and polyuria.   Hematologic/Lymphatic: Does not bruise/bleed easily.   Skin: Negative for rash.   Musculoskeletal: Positive for back pain. Negative for falls, joint pain, joint swelling and neck pain.   Gastrointestinal: Negative for abdominal pain, heartburn, nausea and vomiting.   Genitourinary: Negative for dysuria, frequency and hematuria.   Neurological: Positive for dizziness. Negative for difficulty with concentration, focal weakness, headaches, light-headedness, numbness, seizures and weakness.   Psychiatric/Behavioral: Negative for depression, memory loss and substance abuse. The patient does not have insomnia.    Allergic/Immunologic: Negative for HIV exposure and hives.       Objective:   Physical Exam  HENT:      Head:  Normocephalic.   Eyes:      Pupils: Pupils are equal, round, and reactive to light.   Neck:      Thyroid: No thyromegaly.      Vascular: Normal carotid pulses. No carotid bruit or JVD.   Cardiovascular:      Rate and Rhythm: Normal rate and regular rhythm.  No extrasystoles are present.     Chest Wall: PMI is not displaced.      Pulses: Normal pulses.      Heart sounds: Normal heart sounds. No murmur heard.    No gallop. No S3 sounds.   Pulmonary:      Effort: No respiratory distress.      Breath sounds: Normal breath sounds. No stridor.   Abdominal:      General: Bowel sounds are normal.      Palpations: Abdomen is soft.      Tenderness: There is no abdominal tenderness. There is no rebound.   Musculoskeletal:         General: Normal range of motion.   Skin:     Findings: No rash.   Neurological:      Mental Status: He is alert and oriented to person, place, and time.   Psychiatric:         Behavior: Behavior normal.         Lab Results   Component Value Date    CHOL 123 10/02/2021    CHOL 142 06/29/2020     Lab Results   Component Value Date    HDL 47 10/02/2021    HDL 41 06/29/2020     Lab Results   Component Value Date    LDLCALC 63.6 10/02/2021    LDLCALC 77 06/29/2020     Lab Results   Component Value Date    TRIG 62 10/02/2021    TRIG 118 06/29/2020     Lab Results   Component Value Date    CHOLHDL 38.2 10/02/2021    CHOLHDL 3.5 06/29/2020       Chemistry        Component Value Date/Time     02/28/2022 0814    K 4.2 02/28/2022 0814     02/28/2022 0814    CO2 27 02/28/2022 0814    BUN 19 02/28/2022 0814    CREATININE 0.9 02/28/2022 0814     (H) 02/28/2022 0814        Component Value Date/Time    CALCIUM 9.6 02/28/2022 0814    ALKPHOS 92 12/22/2021 1218    AST 19 12/22/2021 1218    ALT 20 12/22/2021 1218    BILITOT 0.4 12/22/2021 1218    ESTGFRAFRICA >60 02/28/2022 0814    EGFRNONAA >60 02/28/2022 0814          Lab Results   Component Value Date    HGBA1C 5.5 12/05/2021     Lab Results    Component Value Date    TSH 1.750 10/03/2021     Lab Results   Component Value Date    INR 0.9 02/28/2022     Lab Results   Component Value Date    WBC 9.08 02/28/2022    HGB 13.7 (L) 02/28/2022    HCT 42.1 02/28/2022    MCV 87 02/28/2022     02/28/2022     BMP  Sodium   Date Value Ref Range Status   02/28/2022 140 136 - 145 mmol/L Final     Potassium   Date Value Ref Range Status   02/28/2022 4.2 3.5 - 5.1 mmol/L Final     Chloride   Date Value Ref Range Status   02/28/2022 104 95 - 110 mmol/L Final     CO2   Date Value Ref Range Status   02/28/2022 27 23 - 29 mmol/L Final     BUN   Date Value Ref Range Status   02/28/2022 19 8 - 23 mg/dL Final     Creatinine   Date Value Ref Range Status   02/28/2022 0.9 0.5 - 1.4 mg/dL Final     Calcium   Date Value Ref Range Status   02/28/2022 9.6 8.7 - 10.5 mg/dL Final     Anion Gap   Date Value Ref Range Status   02/28/2022 9 8 - 16 mmol/L Final     eGFR if    Date Value Ref Range Status   02/28/2022 >60 >60 mL/min/1.73 m^2 Final     eGFR if non    Date Value Ref Range Status   02/28/2022 >60 >60 mL/min/1.73 m^2 Final     Comment:     Calculation used to obtain the estimated glomerular filtration  rate (eGFR) is the CKD-EPI equation.        BNP  @LABRCNTIP(BNP,BNPTRIAGEBLO)@  @LABRCNTIP(troponini)@  CrCl cannot be calculated (Patient's most recent lab result is older than the maximum 7 days allowed.).  No results found in the last 24 hours.  No results found in the last 24 hours.  No results found in the last 24 hours.    Assessment:      1. Coronary artery disease of native artery of native heart with stable angina pectoris    2. PAD (peripheral artery disease)    3. Hypertension associated with diabetes    4. Hyperlipidemia associated with type 2 diabetes mellitus    5. Aneurysm of left iliac artery    6. Type 2 diabetes mellitus with foot ulcer, without long-term current use of insulin      Cath showed distal Lcx . Right groin no  hematoma  PAD exertional claudication  High BP   LDL controlled  Plan:   Increase Pletal to 100 mg bid fro PAD  Increase Losartan to 100 mg daily for HTN  Add toprolXl 25 mg dialy for HTN and CAD  Continue ASA statin, amlodipine   Continue exercise  DM Rx per PCP  Counseled DASH  Check Lipid profile in 6 months  Recommend heart-healthy diet, weight control and regular exercise.  Melva. Risk modification.   I have reviewed all pertinent labs and cardiac studies independently. Plans and recommendations have been formulated under my direct supervision. All questions answered and patient voiced understanding.   If symptoms persist go to the ED  RTC in 3 months

## 2022-03-09 ENCOUNTER — DOCUMENT SCAN (OUTPATIENT)
Dept: HOME HEALTH SERVICES | Facility: HOSPITAL | Age: 67
End: 2022-03-09
Payer: MEDICARE

## 2022-03-13 ENCOUNTER — PATIENT OUTREACH (OUTPATIENT)
Dept: ADMINISTRATIVE | Facility: OTHER | Age: 67
End: 2022-03-13
Payer: MEDICARE

## 2022-03-14 ENCOUNTER — OFFICE VISIT (OUTPATIENT)
Dept: DERMATOLOGY | Facility: CLINIC | Age: 67
End: 2022-03-14
Payer: MEDICARE

## 2022-03-14 DIAGNOSIS — L81.4 SOLAR LENTIGO: ICD-10-CM

## 2022-03-14 DIAGNOSIS — D48.5 NEOPLASM OF UNCERTAIN BEHAVIOR OF SKIN: ICD-10-CM

## 2022-03-14 DIAGNOSIS — L82.1 SEBORRHEIC KERATOSES: ICD-10-CM

## 2022-03-14 DIAGNOSIS — L57.0 ACTINIC KERATOSES: Primary | ICD-10-CM

## 2022-03-14 DIAGNOSIS — D22.9 MULTIPLE BENIGN NEVI: ICD-10-CM

## 2022-03-14 PROCEDURE — 17004 DESTROY PREMAL LESIONS 15/>: CPT | Mod: PBBFAC | Performed by: STUDENT IN AN ORGANIZED HEALTH CARE EDUCATION/TRAINING PROGRAM

## 2022-03-14 PROCEDURE — 17004 DESTROY PREMAL LESIONS 15/>: CPT | Mod: S$PBB,,, | Performed by: STUDENT IN AN ORGANIZED HEALTH CARE EDUCATION/TRAINING PROGRAM

## 2022-03-14 PROCEDURE — 99204 OFFICE O/P NEW MOD 45 MIN: CPT | Mod: 25,S$PBB,, | Performed by: STUDENT IN AN ORGANIZED HEALTH CARE EDUCATION/TRAINING PROGRAM

## 2022-03-14 PROCEDURE — 11103 TANGNTL BX SKIN EA SEP/ADDL: CPT | Mod: S$PBB,,, | Performed by: STUDENT IN AN ORGANIZED HEALTH CARE EDUCATION/TRAINING PROGRAM

## 2022-03-14 PROCEDURE — 99999 PR PBB SHADOW E&M-EST. PATIENT-LVL IV: ICD-10-PCS | Mod: PBBFAC,,, | Performed by: STUDENT IN AN ORGANIZED HEALTH CARE EDUCATION/TRAINING PROGRAM

## 2022-03-14 PROCEDURE — 11103 PR TANGENTIAL BIOPSY, SKIN, EA ADDTL LESION: ICD-10-PCS | Mod: S$PBB,,, | Performed by: STUDENT IN AN ORGANIZED HEALTH CARE EDUCATION/TRAINING PROGRAM

## 2022-03-14 PROCEDURE — 88305 TISSUE EXAM BY PATHOLOGIST: CPT | Mod: 26,,, | Performed by: PATHOLOGY

## 2022-03-14 PROCEDURE — 88305 TISSUE EXAM BY PATHOLOGIST: ICD-10-PCS | Mod: 26,,, | Performed by: PATHOLOGY

## 2022-03-14 PROCEDURE — 11103 TANGNTL BX SKIN EA SEP/ADDL: CPT | Mod: PBBFAC | Performed by: STUDENT IN AN ORGANIZED HEALTH CARE EDUCATION/TRAINING PROGRAM

## 2022-03-14 PROCEDURE — 88305 TISSUE EXAM BY PATHOLOGIST: CPT | Performed by: PATHOLOGY

## 2022-03-14 PROCEDURE — 11102 PR TANGENTIAL BIOPSY, SKIN, SINGLE LESION: ICD-10-PCS | Mod: S$PBB,XS,, | Performed by: STUDENT IN AN ORGANIZED HEALTH CARE EDUCATION/TRAINING PROGRAM

## 2022-03-14 PROCEDURE — 99204 PR OFFICE/OUTPT VISIT, NEW, LEVL IV, 45-59 MIN: ICD-10-PCS | Mod: 25,S$PBB,, | Performed by: STUDENT IN AN ORGANIZED HEALTH CARE EDUCATION/TRAINING PROGRAM

## 2022-03-14 PROCEDURE — 11102 TANGNTL BX SKIN SINGLE LES: CPT | Mod: PBBFAC,XS | Performed by: STUDENT IN AN ORGANIZED HEALTH CARE EDUCATION/TRAINING PROGRAM

## 2022-03-14 PROCEDURE — 99999 PR PBB SHADOW E&M-EST. PATIENT-LVL IV: CPT | Mod: PBBFAC,,, | Performed by: STUDENT IN AN ORGANIZED HEALTH CARE EDUCATION/TRAINING PROGRAM

## 2022-03-14 PROCEDURE — 11102 TANGNTL BX SKIN SINGLE LES: CPT | Mod: S$PBB,XS,, | Performed by: STUDENT IN AN ORGANIZED HEALTH CARE EDUCATION/TRAINING PROGRAM

## 2022-03-14 PROCEDURE — 99214 OFFICE O/P EST MOD 30 MIN: CPT | Mod: PBBFAC,25 | Performed by: STUDENT IN AN ORGANIZED HEALTH CARE EDUCATION/TRAINING PROGRAM

## 2022-03-14 PROCEDURE — 17004 PR DESTRUCTION, PREMALIGNANT LESIONS; 15 OR MORE LESIONS: ICD-10-PCS | Mod: S$PBB,,, | Performed by: STUDENT IN AN ORGANIZED HEALTH CARE EDUCATION/TRAINING PROGRAM

## 2022-03-14 RX ORDER — FLUOROURACIL 50 MG/G
CREAM TOPICAL
Qty: 40 G | Refills: 1 | Status: SHIPPED | OUTPATIENT
Start: 2022-03-14 | End: 2022-10-03

## 2022-03-14 NOTE — PATIENT INSTRUCTIONS
Shave Biopsy Wound Care    Your doctor has performed a shave biopsy today.  A band aid and vaseline ointment has been placed over the site.  This should remain in place for NO LONGER THAN 48 hours.  It is fine to remove the bandaid after 24 hours, if the area is no longer bleeding. It is recommended that you keep the area dry (do not wet)) for the first 24 hours.  After 24 hours, wash the area with warm soap and water and apply Vaseline jelly.  Many patients prefer to use Neosporin or Bacitracin ointment.  This is acceptable; however, know that you can develop an allergy to this medication even if you have used it safely for years.  It is important to keep the area moist.  Letting it dry out and get air slows healing time, and will worsen the scar.        If you notice increasing redness, tenderness, pain, or yellow drainage at the biopsy site, please notify your doctor.  These are signs of an infection.    If your biopsy site is bleeding, apply firm pressure for 15 minutes straight.  Repeat for another 15 minutes, if it is still bleeding.   If the surgical site continues to bleed, then please contact your doctor.      For [facility] users:   You will receive your biopsy results in [facility] as soon as they are available. Please be assured that your physician/provider will review your results and will then determine what further treatment, evaluation, or planning is required. You should be contacted by your physician's/provider's office within 5 business days of receiving your results; If not, please reach out to directly. This is one more way [facility] is putting you first.     [facility] [place] [address]/ [phone] FAX/ [email or web address]     CRYOSURGERY      Your doctor has used a method called cryosurgery to treat your skin condition. Cryosurgery refers to the use of very cold substances to treat a variety of skin conditions such as warts, pre-skin cancers, molluscum contagiosum,  sun spots, and several benign growths. The substance we use in cryosurgery is liquid nitrogen and is so cold (-195 degrees Celsius) that is burns when administered.     Following treatment in the office, the skin may immediately burn and become red. You may find the area around the lesion is affected as well. It is sometimes necessary to treat not only the lesion, but a small area of the surrounding normal skin to achieve a good response.     A blister, and even a blood filled blister, may form after treatment.   This is a normal response. If the blister is painful, it is acceptable to sterilize a needle and with rubbing alcohol and gently pop the blister. It is important that you gently wash the area with soap and warm water as the blister fluid may contain wart virus if a wart was treated. Do no remove the roof of the blister.     The area treated can take anywhere from 1-3 weeks to heal. Healing time depends on the kind skin lesion treated, the location, and how aggressively the lesion was treated. It is recommended that the areas treated are covered with Vaseline or bacitracin ointment and a band-aid. If a band-aid is not practical, just ointment applied several times per day will do. Keeping these areas moist will speed the healing time.    Treatment with liquid nitrogen can leave a scar. In dark skin, it may be a light or dark scar, in light skin it may be a white or pink scar. These will generally fade with time, but may never go away completely.     If you have any concerns after your treatment, please feel free to call the office.       [facility] [place] [address]/ [phone] FAX/ [email or web address]

## 2022-03-14 NOTE — PROGRESS NOTES
Patient Information  Name: Lamine Fong  : 1955  MRN: [id number]     Referring Physician:  Dr. Michelle   Primary Care Physician:  Dr. Carlitos Michelle MD   Date of Visit: 2022      Subjective:       Lamine Fong is a 66 y.o. male who presents for   Chief Complaint   Patient presents with    Skin Check     HPI  Patient here for skin check.     Does patient have a personal hx of skin cancers? no  Does patient have family hx of melanoma?  no  Does patient have hx of strong sun exposure or tanning bed use in the past? Yes to sun exposure    Patient was last seen:Visit date not found     Prior notes by myself reviewed.   Clinical documentation obtained by nursing staff reviewed.    Review of Systems   Skin: Negative for itching and rash.        Objective:    Physical Exam   Constitutional: He appears well-developed and well-nourished. No distress.   Neurological: He is alert and oriented to person, place, and time. He is not disoriented.   Psychiatric: He has a normal mood and affect.   Skin:   Areas Examined (abnormalities noted in diagram):   Scalp / Hair Palpated and Inspected  Head / Face Inspection Performed  Neck Inspection Performed  Chest / Axilla Inspection Performed  Abdomen Inspection Performed  Genitals / Buttocks / Groin Inspection Performed  Back Inspection Performed  RUE Inspected  LUE Inspection Performed  RLE Inspected  LLE Inspection Performed  Nails and Digits Inspection Performed                   Diagram Legend     Erythematous scaling macule/papule c/w actinic keratosis       Vascular papule c/w angioma      Pigmented verrucoid papule/plaque c/w seborrheic keratosis      Yellow umbilicated papule c/w sebaceous hyperplasia      Irregularly shaped tan macule c/w lentigo     1-2 mm smooth white papules consistent with Milia      Movable subcutaneous cyst with punctum c/w epidermal inclusion cyst      Subcutaneous movable cyst c/w pilar cyst      Firm pink to brown papule c/w  dermatofibroma      Pedunculated fleshy papule(s) c/w skin tag(s)      Evenly pigmented macule c/w junctional nevus     Mildly variegated pigmented, slightly irregular-bordered macule c/w mildly atypical nevus      Flesh colored to evenly pigmented papule c/w intradermal nevus       Pink pearly papule/plaque c/w basal cell carcinoma      Erythematous hyperkeratotic cursted plaque c/w SCC      Surgical scar with no sign of skin cancer recurrence      Open and closed comedones      Inflammatory papules and pustules      Verrucoid papule consistent consistent with wart     Erythematous eczematous patches and plaques     Dystrophic onycholytic nail with subungual debris c/w onychomycosis     Umbilicated papule    Erythematous-base heme-crusted tan verrucoid plaque consistent with inflamed seborrheic keratosis     Erythematous Silvery Scaling Plaque c/w Psoriasis     See annotation                [] Data reviewed  [] Independent review of test  [] Management discussed with another provider    Assessment / Plan:      Pathology Orders:     Normal Orders This Visit    Specimen to Pathology, Dermatology     Questions:    Procedure Type: Dermatology and skin neoplasms    Number of Specimens: 4    ------------------------: -------------------------    Spec 1 Procedure: Biopsy    Spec 1 Clinical Impression: r/o NMSC    Spec 1 Source: right shoulder    ------------------------: -------------------------    Spec 2 Procedure: Biopsy    Spec 2 Clinical Impression: r/o NMSC    Spec 2 Source: right cheek    ------------------------: -------------------------    Spec 3 Procedure: Biopsy    Spec 3 Clinical Impression: r/o NMSC    Spec 3 Source: left nasal ala    ------------------------: -------------------------    Spec 4 Procedure: Biopsy    Spec 4 Clinical Impression: r/o NMSC    Spec 4 Source: left posterior ear    Release to patient: Immediate        Actinic keratoses  -     fluorouraciL (EFUDEX) 5 % cream; AAA bilateral arms bid  x 4 weeks  Dispense: 40 g; Refill: 1  Cryosurgery Procedure Note    Verbal consent from the patient is obtained including, but not limited to, risk of hypopigmentation/hyperpigmentation, scar, recurrence of lesion. The patient is aware of the precancerous quality and need for treatment of these lesions. Liquid nitrogen cryosurgery is applied to the 17 actinic keratoses, as detailed in the physical exam, to produce a freeze injury. The patient is aware that blisters may form and is instructed on wound care with gentle cleansing and use of vaseline ointment to keep moist until healed. The patient is supplied a handout on cryosurgery and is instructed to call if lesions do not completely resolve.    Neoplasm of uncertain behavior of skin  -     Ambulatory referral/consult to Dermatology  -     Specimen to Pathology, Dermatology  Shave biopsy procedure note:    Shave biopsy performed after verbal consent including risk of infection, scar, recurrence, need for additional treatment of site. Area prepped with alcohol, anesthetized with approximately 1.0cc of 1% lidocaine with epinephrine. Lesional tissue shaved with dermablade. Hemostasis achieved with application of aluminum chloride. No complications. Dressing applied. Wound care explained.      Solar lentigo  This is a benign hyperpigmented sun induced lesion. Recommend daily sun protection/avoidance and use of at least SPF 30, broad spectrum sunscreen (OTC drug) will reduce the number of new lesions. Treatment of these benign lesions are considered cosmetic.      Seborrheic keratoses  These are benign inherited growths without a malignant potential. Reassurance given to patient. No treatment is necessary.     Multiple benign nevi  Discussed ABCDE's of nevi.  Monitor for new mole or moles that are becoming bigger, darker, irritated, or developing irregular borders. Brochure provided. Instructed patient to observe lesion(s) for changes and follow up in clinic if changes  are noted. Patient to monitor skin at home for new or changing lesions.                LOS NUMBER AND COMPLEXITY OF PROBLEMS    COMPLEXITY OF DATA RISK TOTAL TIME (m)   22608  44080 [] 1 self-limited or minor problem [x] Minimal to none [] No treatment recommended or patient to monitor 15-29  10-19   28517  68135 Low  [] 2 or > self limited or minor problems  [] 1 stable chronic illness  [] 1 acute, uncomplicated illness or injury Limited (2)  [] Prior external notes from each unique source  [] Review result of each unique test  [] Order each unique test []  Low  OTC medications, minor skin biopsy 30-44  20-29   11814  63631 Moderate  [x]  1 or > chronic illness with progression, exacerbation or SE of treatment  [x]  2 or more stable chronic illnesses  []  1 acute illness with systemic symptoms  []  1 acute complicated injury  []  1 undiagnosed new problem with uncertain prognosis Moderate (1/3 below)  []  3 or more data items        *Now includes assessment requiring independent historian  []  Independent interpretation of a test  []  Discuss management/test with another provider Moderate  [x]  Prescription drug mgmt  []  Minor surgery with risk discussed  []  Mgmt limited by social determinates 45-59  30-39   65399  [id number] High  []  1 or more chronic illness with severe exacerbation, progression or SE of treatment  []  1 acute or chronic illness/injury that poses a threat to life or bodily function Extensive (2/3 below)  []  3 or more data items        *Now includes assessment requiring independent historian.  []  Independent interpretation of a test  []  Discuss management/test with another provider High  []  Major surgery with risk discussed  []  Drug therapy requiring intensive monitoring for toxicity  []  Hospitalization  []  Decision for DNR 60-74  40-54      No follow-ups on file.    Anni Estrada MD, FAAD  [facility]

## 2022-03-17 NOTE — TELEPHONE ENCOUNTER
----- Message from Alfredo Roa sent at 3/17/2022  1:58 PM CDT -----  Contact: Dylan/son  Zeny is calling to see when pt's medication will be called in. He stated that it was listed on after summary. Please call him back at [phone].            Thanks  DD

## 2022-03-18 DIAGNOSIS — J43.2 CENTRILOBULAR EMPHYSEMA: Primary | ICD-10-CM

## 2022-03-18 RX ORDER — SIMVASTATIN 20 MG/1
20 TABLET, FILM COATED ORAL NIGHTLY
Qty: 90 TABLET | Refills: 3
Start: 2022-03-18 | End: 2022-03-24 | Stop reason: SDUPTHER

## 2022-03-21 ENCOUNTER — PATIENT MESSAGE (OUTPATIENT)
Dept: ADMINISTRATIVE | Facility: HOSPITAL | Age: 67
End: 2022-03-21
Payer: MEDICARE

## 2022-03-21 LAB
FINAL PATHOLOGIC DIAGNOSIS: NORMAL
GROSS: NORMAL
Lab: NORMAL
MICROSCOPIC EXAM: NORMAL

## 2022-03-24 RX ORDER — SIMVASTATIN 20 MG/1
20 TABLET, FILM COATED ORAL NIGHTLY
Qty: 90 TABLET | Refills: 3
Start: 2022-03-24 | End: 2022-03-25 | Stop reason: SDUPTHER

## 2022-03-24 NOTE — TELEPHONE ENCOUNTER
----- Message from Erika Cuello sent at 3/24/2022  8:13 AM CDT -----  .Type:  RX Refill Request    Who Called: Son   Refill or New Rx:  simvastatin (ZOCOR) 20 MG tablet  RX Name and Strength:  How is the patient currently taking it? (ex. 1XDay):  Daily   Is this a 30 day or 90 day RX: 90    Preferred Pharmacy with phone number:  [facility] [address] [address]  [address] [address] [address]  Phone: [phone] Fax: [phone]

## 2022-03-25 RX ORDER — SIMVASTATIN 20 MG/1
20 TABLET, FILM COATED ORAL NIGHTLY
Qty: 90 TABLET | Refills: 3 | Status: SHIPPED | OUTPATIENT
Start: 2022-03-25 | End: 2022-03-28 | Stop reason: SDUPTHER

## 2022-03-25 NOTE — TELEPHONE ENCOUNTER
----- Message from Sury Cosby sent at 3/25/2022  8:33 AM CDT -----  Regarding: rx  Contact: [facility]  Pt states the dr is to call in [facility] but they have not received it yet. [phone]

## 2022-03-28 ENCOUNTER — PROCEDURE VISIT (OUTPATIENT)
Dept: DERMATOLOGY | Facility: CLINIC | Age: 67
End: 2022-03-28
Payer: MEDICARE

## 2022-03-28 DIAGNOSIS — C44.612 BASAL CELL CARCINOMA (BCC) OF RIGHT SHOULDER: Primary | ICD-10-CM

## 2022-03-28 PROCEDURE — 11602 PR EXC SKIN MALIG 1.1-2 CM TRUNK,ARM,LEG: ICD-10-PCS | Mod: 51,S$PBB,, | Performed by: STUDENT IN AN ORGANIZED HEALTH CARE EDUCATION/TRAINING PROGRAM

## 2022-03-28 PROCEDURE — 12032 INTMD RPR S/A/T/EXT 2.6-7.5: CPT | Mod: S$PBB,,, | Performed by: STUDENT IN AN ORGANIZED HEALTH CARE EDUCATION/TRAINING PROGRAM

## 2022-03-28 PROCEDURE — 88305 TISSUE EXAM BY PATHOLOGIST: ICD-10-PCS | Mod: 26,,, | Performed by: DERMATOLOGY

## 2022-03-28 PROCEDURE — 88305 TISSUE EXAM BY PATHOLOGIST: CPT | Performed by: DERMATOLOGY

## 2022-03-28 PROCEDURE — 99499 NO LOS: ICD-10-PCS | Mod: S$PBB,,, | Performed by: STUDENT IN AN ORGANIZED HEALTH CARE EDUCATION/TRAINING PROGRAM

## 2022-03-28 PROCEDURE — 12032 PR LAYR CLOS WND TRUNK,ARM,LEG 2.6-7.5 CM: ICD-10-PCS | Mod: S$PBB,,, | Performed by: STUDENT IN AN ORGANIZED HEALTH CARE EDUCATION/TRAINING PROGRAM

## 2022-03-28 PROCEDURE — 12032 INTMD RPR S/A/T/EXT 2.6-7.5: CPT | Mod: PBBFAC | Performed by: STUDENT IN AN ORGANIZED HEALTH CARE EDUCATION/TRAINING PROGRAM

## 2022-03-28 PROCEDURE — 88305 TISSUE EXAM BY PATHOLOGIST: CPT | Mod: 26,,, | Performed by: DERMATOLOGY

## 2022-03-28 PROCEDURE — 11602 EXC TR-EXT MAL+MARG 1.1-2 CM: CPT | Mod: 51,S$PBB,, | Performed by: STUDENT IN AN ORGANIZED HEALTH CARE EDUCATION/TRAINING PROGRAM

## 2022-03-28 PROCEDURE — 11602 EXC TR-EXT MAL+MARG 1.1-2 CM: CPT | Mod: PBBFAC | Performed by: STUDENT IN AN ORGANIZED HEALTH CARE EDUCATION/TRAINING PROGRAM

## 2022-03-28 PROCEDURE — 99499 UNLISTED E&M SERVICE: CPT | Mod: S$PBB,,, | Performed by: STUDENT IN AN ORGANIZED HEALTH CARE EDUCATION/TRAINING PROGRAM

## 2022-03-28 RX ORDER — SIMVASTATIN 20 MG/1
20 TABLET, FILM COATED ORAL NIGHTLY
Qty: 90 TABLET | Refills: 3 | Status: SHIPPED | OUTPATIENT
Start: 2022-03-28 | End: 2023-10-30 | Stop reason: SDUPTHER

## 2022-03-28 NOTE — PROGRESS NOTES
Patient Information  Name: Lamine Fong  : 1955  MRN: [id number]     Referring Physician:  Dr. Pena ref. provider found   Primary Care Physician:  Dr. Carlitos Michelle MD   Date of Visit: 2022      Subjective:       Lamine Fong is a 66 y.o. male who presents for BCC    HPI  Dermatologic Surgery preoperative checklist:    Pacemaker/Defibrillator:  none    Anticoagulants:  Aspirin 81 mg, cilostazol    Implants requiring prophylaxis:  none    Any other conditions affecting surgery:  none    Patient was last seen:3/14/2022     Prior notes by myself reviewed.   Clinical documentation obtained by nursing staff reviewed.    Review of Systems   Skin: Negative for itching and rash.        Objective:    Physical Exam   Constitutional: He appears well-developed and well-nourished. No distress.   Neurological: He is alert and oriented to person, place, and time. He is not disoriented.   Psychiatric: He has a normal mood and affect.   Skin:   Areas Examined (abnormalities noted in diagram):   Back Inspection Performed              Diagram Legend     Erythematous scaling macule/papule c/w actinic keratosis       Vascular papule c/w angioma      Pigmented verrucoid papule/plaque c/w seborrheic keratosis      Yellow umbilicated papule c/w sebaceous hyperplasia      Irregularly shaped tan macule c/w lentigo     1-2 mm smooth white papules consistent with Milia      Movable subcutaneous cyst with punctum c/w epidermal inclusion cyst      Subcutaneous movable cyst c/w pilar cyst      Firm pink to brown papule c/w dermatofibroma      Pedunculated fleshy papule(s) c/w skin tag(s)      Evenly pigmented macule c/w junctional nevus     Mildly variegated pigmented, slightly irregular-bordered macule c/w mildly atypical nevus      Flesh colored to evenly pigmented papule c/w intradermal nevus       Pink pearly papule/plaque c/w basal cell carcinoma      Erythematous hyperkeratotic cursted plaque c/w SCC      Surgical scar  with no sign of skin cancer recurrence      Open and closed comedones      Inflammatory papules and pustules      Verrucoid papule consistent consistent with wart     Erythematous eczematous patches and plaques     Dystrophic onycholytic nail with subungual debris c/w onychomycosis     Umbilicated papule    Erythematous-base heme-crusted tan verrucoid plaque consistent with inflamed seborrheic keratosis     Erythematous Silvery Scaling Plaque c/w Psoriasis     See annotation      No images are attached to the encounter or orders placed in the encounter.    [] Data reviewed  [] Independent review of test  [] Management discussed with another provider    Assessment / Plan:      Pathology Orders:     Normal Orders This Visit    Specimen to Pathology, Dermatology     Questions:    Procedure Type: Dermatology and skin neoplasms    Number of Specimens: 1    ------------------------: -------------------------    Spec 1 Procedure: Excision >2cm    Spec 1 Clinical Impression: biopsy proven BCC, please check margins    Spec 1 Source: right shoulder    Release to patient: Immediate        Basal cell carcinoma (BCC) of right shoulder  -     Specimen to Pathology, Dermatology  PROCEDURE: Elliptical excision with intermediate layered repair in order to decrease dead space, decrease tension, and close large gap.    ANESTHETIC: 6 cc 1% Xylocaine with Epinephrine 1:100,000, buffered    SURGEON: Anni Estrada M.D.    ASSISTANTS: Marilu Payne MA    PREOPERATIVE DIAGNOSIS:  Biopsy-proven Basal Cell Carcinoma    POSTOPERATIVE DIAGNOSIS:  Same as preoperative diagnosis    PATHOLOGIC DIAGNOSIS: Pending    LOCATION: right shoulder    INITIAL LESION SIZE: 1 cm    EXCISED DIAMETER: 1.8 cm    PREPARATION: The diagnosis, procedure, alternatives, benefits and risks, including but not limited to: infection, bleeding/bruising, drug reactions, pain, scar or cosmetic defect, local sensation disturbances, wound dehiscence (separation of wound edges  after sutures removed) and/or recurrence of present condition were explained to the patient. The patient elected to proceed.  Patient's identity was verified using 2 patient identifiers and the side and site was verified.  Time out period with surgeon, assistant and patient in surgical suite was taken.    PROCEDURE: The location noted above was prepped, draped, and anesthetized in the usual sterile fashion per Anni Estrada MD. Lesional tissue was carefully marked with at least 4 mm margins of clinically normal skin in all directions. A fusiform elliptical excision was done with #15 blade carried down completely through the dermis into the deep subcutaneous tissues to the level of the non-muscle fascia, and dissection was carried out in that plane.  Electrocoagulation was used to obtain hemostasis. Blood loss was minimal. The wound was then approximated in a layered fashion with subcutaneous and intradermal sutures of 4.0 Monocryl, approximately 3 in number, and the wound was then superficially closed with simple interrupted sutures of 4.0 Prolene.    The patient tolerated the procedure well.    The area was cleaned and dressed appropriately and the patient was given wound care instructions, as well as an appointment for follow-up evaluation.    LENGTH OF REPAIR: 5.5 cm             LOS NUMBER AND COMPLEXITY OF PROBLEMS    COMPLEXITY OF DATA RISK TOTAL TIME (m)   46975  44381 [] 1 self-limited or minor problem [] Minimal to none [] No treatment recommended or patient to monitor 15-29  10-19   27787  09214 Low  [] 2 or > self limited or minor problems  [] 1 stable chronic illness  [] 1 acute, uncomplicated illness or injury Limited (2)  [] Prior external notes from each unique source  [] Review result of each unique test  [] Order each unique test []  Low  OTC medications, minor skin biopsy 30-44 20-29   12853  77492 Moderate  []  1 or > chronic illness with progression, exacerbation or SE of treatment  []  2 or more  stable chronic illnesses  []  1 acute illness with systemic symptoms  []  1 acute complicated injury  []  1 undiagnosed new problem with uncertain prognosis Moderate (1/3 below)  []  3 or more data items        *Now includes assessment requiring independent historian  []  Independent interpretation of a test  []  Discuss management/test with another provider Moderate  []  Prescription drug mgmt  []  Minor surgery with risk discussed  []  Mgmt limited by social determinates 45-59  30-39   86891  50400 High  []  1 or more chronic illness with severe exacerbation, progression or SE of treatment  []  1 acute or chronic illness/injury that poses a threat to life or bodily function Extensive (2/3 below)  []  3 or more data items        *Now includes assessment requiring independent historian.  []  Independent interpretation of a test  []  Discuss management/test with another provider High  []  Major surgery with risk discussed  []  Drug therapy requiring intensive monitoring for toxicity  []  Hospitalization  []  Decision for DNR 60-74  40-54      Follow up in about 2 weeks (around 4/11/2022).    Anni Estrada MD, FAAD  [facility]

## 2022-04-06 LAB
FINAL PATHOLOGIC DIAGNOSIS: NORMAL
GROSS: NORMAL
Lab: NORMAL
MICROSCOPIC EXAM: NORMAL

## 2022-04-11 ENCOUNTER — CLINICAL SUPPORT (OUTPATIENT)
Dept: DERMATOLOGY | Facility: CLINIC | Age: 67
End: 2022-04-11
Payer: MEDICARE

## 2022-04-11 DIAGNOSIS — Z48.02 VISIT FOR SUTURE REMOVAL: Primary | ICD-10-CM

## 2022-04-18 ENCOUNTER — TELEPHONE (OUTPATIENT)
Dept: DERMATOLOGY | Facility: CLINIC | Age: 67
End: 2022-04-18
Payer: MEDICARE

## 2022-04-18 ENCOUNTER — OFFICE VISIT (OUTPATIENT)
Dept: INTERNAL MEDICINE | Facility: CLINIC | Age: 67
End: 2022-04-18
Payer: MEDICARE

## 2022-04-18 ENCOUNTER — LAB VISIT (OUTPATIENT)
Dept: LAB | Facility: HOSPITAL | Age: 67
End: 2022-04-18
Attending: FAMILY MEDICINE
Payer: MEDICARE

## 2022-04-18 VITALS
HEART RATE: 64 BPM | WEIGHT: 230.81 LBS | SYSTOLIC BLOOD PRESSURE: 134 MMHG | HEIGHT: 76 IN | TEMPERATURE: 98 F | BODY MASS INDEX: 28.11 KG/M2 | OXYGEN SATURATION: 64 % | DIASTOLIC BLOOD PRESSURE: 80 MMHG

## 2022-04-18 DIAGNOSIS — I15.2 HYPERTENSION ASSOCIATED WITH DIABETES: Chronic | ICD-10-CM

## 2022-04-18 DIAGNOSIS — F10.11 HISTORY OF ALCOHOL ABUSE: ICD-10-CM

## 2022-04-18 DIAGNOSIS — L57.8 SUN-DAMAGED SKIN: ICD-10-CM

## 2022-04-18 DIAGNOSIS — K80.20 CHOLELITHIASIS WITHOUT CHOLECYSTITIS: ICD-10-CM

## 2022-04-18 DIAGNOSIS — E11.69 HYPERLIPIDEMIA ASSOCIATED WITH TYPE 2 DIABETES MELLITUS: Chronic | ICD-10-CM

## 2022-04-18 DIAGNOSIS — E78.5 HYPERLIPIDEMIA ASSOCIATED WITH TYPE 2 DIABETES MELLITUS: Chronic | ICD-10-CM

## 2022-04-18 DIAGNOSIS — L97.509 TYPE 2 DIABETES MELLITUS WITH FOOT ULCER, WITHOUT LONG-TERM CURRENT USE OF INSULIN: Primary | ICD-10-CM

## 2022-04-18 DIAGNOSIS — L97.509 TYPE 2 DIABETES MELLITUS WITH FOOT ULCER, WITHOUT LONG-TERM CURRENT USE OF INSULIN: ICD-10-CM

## 2022-04-18 DIAGNOSIS — E11.621 TYPE 2 DIABETES MELLITUS WITH FOOT ULCER, WITHOUT LONG-TERM CURRENT USE OF INSULIN: ICD-10-CM

## 2022-04-18 DIAGNOSIS — I71.40 ABDOMINAL AORTIC ANEURYSM (AAA) WITHOUT RUPTURE: ICD-10-CM

## 2022-04-18 DIAGNOSIS — I65.23 BILATERAL CAROTID ARTERY STENOSIS: ICD-10-CM

## 2022-04-18 DIAGNOSIS — I73.9 PAD (PERIPHERAL ARTERY DISEASE): ICD-10-CM

## 2022-04-18 DIAGNOSIS — E11.621 TYPE 2 DIABETES MELLITUS WITH FOOT ULCER, WITHOUT LONG-TERM CURRENT USE OF INSULIN: Primary | ICD-10-CM

## 2022-04-18 DIAGNOSIS — F32.89 OTHER DEPRESSION: ICD-10-CM

## 2022-04-18 DIAGNOSIS — E11.59 HYPERTENSION ASSOCIATED WITH DIABETES: Chronic | ICD-10-CM

## 2022-04-18 DIAGNOSIS — I25.118 CORONARY ARTERY DISEASE OF NATIVE ARTERY OF NATIVE HEART WITH STABLE ANGINA PECTORIS: ICD-10-CM

## 2022-04-18 DIAGNOSIS — I72.3 ANEURYSM OF LEFT ILIAC ARTERY: ICD-10-CM

## 2022-04-18 DIAGNOSIS — I50.32 CHRONIC DIASTOLIC (CONGESTIVE) HEART FAILURE: ICD-10-CM

## 2022-04-18 LAB
ALBUMIN SERPL BCP-MCNC: 3.9 G/DL (ref 3.5–5.2)
ALP SERPL-CCNC: 75 U/L (ref 55–135)
ALT SERPL W/O P-5'-P-CCNC: 14 U/L (ref 10–44)
AST SERPL-CCNC: 12 U/L (ref 10–40)
BILIRUB DIRECT SERPL-MCNC: 0.1 MG/DL (ref 0.1–0.3)
BILIRUB SERPL-MCNC: 0.3 MG/DL (ref 0.1–1)
ESTIMATED AVG GLUCOSE: 120 MG/DL (ref 68–131)
HBA1C MFR BLD: 5.8 % (ref 4–5.6)
PROT SERPL-MCNC: 7.5 G/DL (ref 6–8.4)

## 2022-04-18 PROCEDURE — 99214 OFFICE O/P EST MOD 30 MIN: CPT | Mod: PBBFAC | Performed by: FAMILY MEDICINE

## 2022-04-18 PROCEDURE — 99999 PR PBB SHADOW E&M-EST. PATIENT-LVL IV: ICD-10-PCS | Mod: PBBFAC,,, | Performed by: FAMILY MEDICINE

## 2022-04-18 PROCEDURE — 99215 PR OFFICE/OUTPT VISIT, EST, LEVL V, 40-54 MIN: ICD-10-PCS | Mod: S$PBB,,, | Performed by: FAMILY MEDICINE

## 2022-04-18 PROCEDURE — 80076 HEPATIC FUNCTION PANEL: CPT | Performed by: FAMILY MEDICINE

## 2022-04-18 PROCEDURE — 36415 COLL VENOUS BLD VENIPUNCTURE: CPT | Performed by: FAMILY MEDICINE

## 2022-04-18 PROCEDURE — 99215 OFFICE O/P EST HI 40 MIN: CPT | Mod: S$PBB,,, | Performed by: FAMILY MEDICINE

## 2022-04-18 PROCEDURE — 99999 PR PBB SHADOW E&M-EST. PATIENT-LVL IV: CPT | Mod: PBBFAC,,, | Performed by: FAMILY MEDICINE

## 2022-04-18 PROCEDURE — 83036 HEMOGLOBIN GLYCOSYLATED A1C: CPT | Performed by: FAMILY MEDICINE

## 2022-04-18 NOTE — TELEPHONE ENCOUNTER
Called and spoke to patient's son, Kiran.  Son inquiring about status of getting patient's surgery scheduled.  Son notified that I sent message over to Dr. Bates to review and that we would call once he has reviewed the case.  Son verbalized understanding.  Patient son to let patient know.    ----- Message from Adrianna Negrete MA sent at 4/18/2022 10:54 AM CDT -----  Regarding: Mohs schedule  Good morning, this patient's son wanted to know when will his dad Mr. Raman be scheduled for a procedure with Dr. Bates.  Please see Dr. Estrada result notes on 3/14/2022.  I'm not sure if Dr. Estrada spoke with Dr. Bates about it or not.  Please give son Kiran a call at [phone].

## 2022-04-19 ENCOUNTER — TELEPHONE (OUTPATIENT)
Dept: DERMATOLOGY | Facility: CLINIC | Age: 67
End: 2022-04-19
Payer: MEDICARE

## 2022-04-19 PROBLEM — R42 POSTURAL DIZZINESS WITH NEAR SYNCOPE: Status: RESOLVED | Noted: 2021-12-06 | Resolved: 2022-04-19

## 2022-04-19 PROBLEM — F32.A DEPRESSION: Status: ACTIVE | Noted: 2022-04-19

## 2022-04-19 PROBLEM — R55 POSTURAL DIZZINESS WITH NEAR SYNCOPE: Status: RESOLVED | Noted: 2021-12-06 | Resolved: 2022-04-19

## 2022-04-19 RX ORDER — METFORMIN HYDROCHLORIDE 1000 MG/1
1000 TABLET ORAL 2 TIMES DAILY WITH MEALS
Qty: 180 TABLET | Refills: 3 | Status: SHIPPED | OUTPATIENT
Start: 2022-04-19 | End: 2023-06-05

## 2022-04-19 NOTE — TELEPHONE ENCOUNTER
Attempted to call patient in order to schedule patient for mohs surgery. Patient's daughter in law, Pura answered.  Mohs surgery scheduled for 5/2/22 at 0730.  Pura gave me number to reach patient/ patient's son, no answer at this time.  Message left for patient to return call to review pre surgery checklist/ instructions.

## 2022-04-19 NOTE — PROGRESS NOTES
Subjective:   Patient ID: Lamine Fong is a 67 y.o. male.  Chief Complaint:  Follow-up    Presents for 3 month follow-up after establishing care in February 2022    Medical history:  - Diabetes mellitus.  Last A1c 5.5%.  Metformin 1000 mg twice a day.  Denies symptoms hypo hyperglycemia.  Previously with small ulcers bilateral great toes which showed fully healed since last visit.  Eye and foot exam up-to-date.  Needs repeat A1c and microalbumin.  - Hypertension with stable chronic diastolic heart failure an abdominal aortic aneurysm.  Improved control.  Still with fluctuations.  Followed by Cardiology.  On amlodipine 10 mg daily, Toprol-XL 25 mg daily, and losartan 100 mg daily.  Reports compliance.  Denies side effects.  No orthostasis.  No shortness of breath.  Intermittent bilateral lower extremity swelling.  - Hyperlipidemia with peripheral vascular disease, coronary artery disease, and bilateral carotid artery stenosis...  Previous CMP with normal kidney, liver, glucose, electrolytes.  LDL at goal.  On simvastatin 20 and Pletal 100 mg twice a day. Reports compliance.  Denies side effects.  No chest pain claudication.  - Neuropathy.  Multifactorial.  On gabapentin 300 mg 3 tablets nightly, multivitamin, and vitamin-D supplements.  - History of alcohol abuse.  Still participating in outpatient program.  Happy to report he currently remains alcohol free.  - Depression.  Mild increase in overall symptoms since last visit, but not affecting ADLs.  On citalopram 20 mg daily.  Higher dose not recommended due to underlying cardiac disease.  Does not think things are bad enough to add an additional medication yet.  - Chronic sun damaged skin.  Saw dermatology.  Basal skin cell skin cancer removed.  Treated with Efudex.  - Cholelithiasis.  No cholecystitis.  No active abdominal complaints.    No new complaints or concerns today    Current Outpatient Medications:     amLODIPine (NORVASC) 10 MG tablet, Take 1 tablet  (10 mg total) by mouth once daily., Disp: 90 tablet, Rfl: 1    aspirin (ECOTRIN) 81 MG EC tablet, Take 1 tablet (81 mg total) by mouth once daily., Disp: , Rfl:     cilostazoL (PLETAL) 100 MG Tab, Take 1 tablet (100 mg total) by mouth 2 (two) times daily., Disp: 60 tablet, Rfl: 11    citalopram (CELEXA) 20 MG tablet, Take 1 tablet (20 mg total) by mouth once daily., Disp: 90 tablet, Rfl: 3    fluorouraciL (EFUDEX) 5 % cream, AAA bilateral arms bid x 4 weeks, Disp: 40 g, Rfl: 1    folic acid (FOLVITE) 1 MG tablet, Take 1 tablet (1 mg total) by mouth once daily., Disp: 30 tablet, Rfl: 11    gabapentin (NEURONTIN) 300 MG capsule, Take 3 capsules (900 mg total) by mouth every evening., Disp: 90 capsule, Rfl: 11    losartan (COZAAR) 100 MG tablet, Take 1 tablet (100 mg total) by mouth once daily., Disp: 90 tablet, Rfl: 2    metoprolol succinate (TOPROL-XL) 25 MG 24 hr tablet, Take 1 tablet (25 mg total) by mouth once daily., Disp: 30 tablet, Rfl: 5    simvastatin (ZOCOR) 20 MG tablet, Take 1 tablet (20 mg total) by mouth every evening., Disp: 90 tablet, Rfl: 3    metFORMIN (GLUCOPHAGE) 1000 MG tablet, Take 1 tablet (1,000 mg total) by mouth 2 (two) times daily with meals., Disp: 180 tablet, Rfl: 1    Review of Systems   Constitutional: Negative for activity change, appetite change, chills, diaphoresis, fatigue, fever and unexpected weight change.   Eyes: Negative for visual disturbance.   Respiratory: Negative for cough, chest tightness, shortness of breath and wheezing.    Cardiovascular: Negative for chest pain, palpitations and leg swelling.   Gastrointestinal: Negative for abdominal pain, constipation, diarrhea, nausea and vomiting.   Endocrine: Negative for cold intolerance, heat intolerance, polydipsia, polyphagia and polyuria.   Genitourinary: Negative for difficulty urinating.   Musculoskeletal: Negative for arthralgias, myalgias and neck pain.   Skin: Positive for wound. Negative for color change,  "pallor and rash.   Neurological: Negative for dizziness, tremors, syncope, weakness, light-headedness, numbness and headaches.   Hematological: Does not bruise/bleed easily.   Psychiatric/Behavioral: Negative for agitation, behavioral problems, confusion, decreased concentration, dysphoric mood, hallucinations, self-injury, sleep disturbance and suicidal ideas. The patient is not nervous/anxious and is not hyperactive.      Objective:   /80   Pulse 64   Temp 98 °F (36.7 °C)   Ht 6' 4" (1.93 m)   Wt 104.7 kg (230 lb 13.2 oz)   SpO2 (!) 64%   BMI 28.10 kg/m²     Physical Exam  Vitals and nursing note reviewed.   Constitutional:       General: He is not in acute distress.     Appearance: Normal appearance. He is well-developed and overweight.   Eyes:      General: No scleral icterus.        Right eye: No discharge.         Left eye: No discharge.      Conjunctiva/sclera: Conjunctivae normal.      Right eye: Right conjunctiva is not injected.      Left eye: Left conjunctiva is not injected.   Neck:      Thyroid: No thyroid mass, thyromegaly or thyroid tenderness.      Vascular: No carotid bruit or JVD.   Cardiovascular:      Rate and Rhythm: Normal rate and regular rhythm.      Pulses:           Radial pulses are 2+ on the right side and 2+ on the left side.      Heart sounds: Murmur heard.     No friction rub. No gallop.   Pulmonary:      Effort: Pulmonary effort is normal. No respiratory distress.      Breath sounds: Normal breath sounds. No wheezing, rhonchi or rales.   Abdominal:      General: There is no distension.      Palpations: Abdomen is soft.      Tenderness: There is no abdominal tenderness. There is no guarding or rebound.   Musculoskeletal:      Right lower le+ Edema present.      Left lower le+ Edema present.   Skin:     General: Skin is warm and dry.      Findings: No rash.      Comments:   Chronic sun damaged skin   Neurological:      General: No focal deficit present.      Mental " Status: He is alert and oriented to person, place, and time. Mental status is at baseline.      Motor: Weakness present. No tremor.      Coordination: Coordination is intact. Coordination normal.      Gait: Gait is intact.   Psychiatric:         Attention and Perception: Attention normal.         Mood and Affect: Mood and affect normal.         Behavior: Behavior normal.         Thought Content: Thought content normal.         Cognition and Memory: Cognition normal.         Judgment: Judgment normal.       Assessment:       ICD-10-CM ICD-9-CM   1. Type 2 diabetes mellitus with foot ulcer, without long-term current use of insulin  E11.621 250.80    L97.509 707.15   2. Other depression  F32.89 311   3. History of alcohol abuse  F10.11 305.03   4. Sun-damaged skin  L57.8 692.79   5. Hypertension associated with diabetes  E11.59 250.80    I15.2 401.9   6. Chronic diastolic (congestive) heart failure  I50.32 428.32     428.0   7. Abdominal aortic aneurysm (AAA) without rupture  I71.4 441.4   8. Aneurysm of left iliac artery  I72.3 442.2   9. Hyperlipidemia associated with type 2 diabetes mellitus  E11.69 250.80    E78.5 272.4   10. Bilateral carotid artery stenosis  I65.23 433.10     433.30   11. PAD (peripheral artery disease)  I73.9 443.9   12. Coronary artery disease of native artery of native heart with stable angina pectoris  I25.118 414.01     413.9   13. Cholelithiasis without cholecystitis  K80.20 574.20     Plan:   Type 2 diabetes mellitus with foot ulcer, without long-term current use of insulin  -     Hemoglobin A1C; Future; Expected date: 04/18/2022  -     Hepatic Function Panel; Future; Expected date: 04/18/2022  Continue metformin 1000 mg twice a day  Additional agent if A1c greater than 8%  Eye exam up-to-date  Foot exam stable  Reports unable to provide a urine sample today for microalbumin.    Other depression  Stable, no side effects, mood and symptoms well controlled on present medication .  Continue  citalopram 20 mg daily  If/when needed would recommend addition of Wellbutrin  mg daily    History of alcohol abuse  Neuropathy  Praised/encourage remaining alcohol free  Continue multivitamin supplements  Continue gabapentin    Sun-damaged skin  Continue per dermatology    Hypertension associated with diabetes  Chronic diastolic (congestive) heart failure  Abdominal aortic aneurysm (AAA) without rupture  Aneurysm of left iliac artery  Stable.  Improved control.  Asymptomatic.  Continue amlodipine 10 mg daily, Toprol-XL 25 mg daily, and losartan 100 mg daily  Follow-up cardiology as scheduled    Hyperlipidemia associated with type 2 diabetes mellitus  Bilateral carotid artery stenosis  PAD (peripheral artery disease)  Coronary artery disease of native artery of native heart with stable angina pectoris  Stable.  Asymptomatic.  LDL at goal.  Continue Pletal 100 mg twice a day  Continue simvastatin 20 mg daily    Cholelithiasis without cholecystitis  Stable.  No active pain.  No treatment mention needed.    Follow-up with any/all specialists scheduled    Return to clinic 3 months sooner as needed     50 minutes of total time spent on the encounter, which includes face to face time and non-face to face time preparing to see the patient (eg, review of tests), Obtaining and/or reviewing separately obtained history, documenting clinical information in the electronic or other health record, independently interpreting results (not separately reported) and communicating results to the patient/family/caregiver, or Care coordination (not separately reported).

## 2022-04-20 ENCOUNTER — PATIENT MESSAGE (OUTPATIENT)
Dept: DERMATOLOGY | Facility: CLINIC | Age: 67
End: 2022-04-20
Payer: MEDICARE

## 2022-04-20 ENCOUNTER — TELEPHONE (OUTPATIENT)
Dept: DERMATOLOGY | Facility: CLINIC | Age: 67
End: 2022-04-20
Payer: MEDICARE

## 2022-04-20 NOTE — TELEPHONE ENCOUNTER
Called and spoke to patient's son, Arsenio, mohs surgeries scheduled for 5/2/22 at 0730 for two sites and the last site will be done on 5/9/22 at 0830. Pre surgery instructions/ checklist completed. Arsenio verbalized understanding.     ----- Message from Alexandru Bates MD sent at 4/19/2022  7:56 AM CDT -----  Regarding: RE: Mohs schedule  I haven't received anything prior to this, but you can go ahead and schedule him for two mohs appointments. We will treat the left nasal ala and left posterior ear at one visit and the right cheek at second appointment (we will remove sutures from first procedure at that visit as well). Thanks, NF  ----- Message -----  From: Dianelys Feliz RN  Sent: 4/18/2022   2:11 PM CDT  To: Alexandru Bates MD  Subject: FW: Mohs schedule                                Good afternoon Dr. Bates,    I got a call from this patient's son.  Looking through the chart, it looks like Dr. Estrada was wanting you to do mohs on 3 different spots, see pathology from 3/14.  I am not sure if you have received anything on this yet.  Please advise.     Dianelys   ----- Message -----  From: Adrianna Negrete MA  Sent: 4/18/2022  10:59 AM CDT  To: Dianelys Feliz RN  Subject: Mohs schedule                                    Good morning, this patient's son wanted to know when will his dad Mr. Raman be scheduled for a procedure with Dr. Bates.  Please see Dr. Estrada result notes on 3/14/2022.  I'm not sure if Dr. Estrada spoke with Dr. Bates about it or not.  Please give son Kiran a call at [phone].

## 2022-04-27 ENCOUNTER — PATIENT MESSAGE (OUTPATIENT)
Dept: SMOKING CESSATION | Facility: CLINIC | Age: 67
End: 2022-04-27
Payer: MEDICARE

## 2022-05-02 ENCOUNTER — PROCEDURE VISIT (OUTPATIENT)
Dept: DERMATOLOGY | Facility: CLINIC | Age: 67
End: 2022-05-02
Payer: MEDICARE

## 2022-05-02 DIAGNOSIS — C44.219 BASAL CELL CARCINOMA, EAR, LEFT: ICD-10-CM

## 2022-05-02 DIAGNOSIS — C44.311 BASAL CELL CARCINOMA (BCC) OF LEFT ALA NASI: Primary | ICD-10-CM

## 2022-05-02 PROCEDURE — 99214 PR OFFICE/OUTPT VISIT, EST, LEVL IV, 30-39 MIN: ICD-10-PCS | Mod: 25,S$PBB,, | Performed by: DERMATOLOGY

## 2022-05-02 PROCEDURE — 15260 FTH/GFT FR N/E/E/L 20 SQCM/<: CPT | Mod: 59,S$PBB,, | Performed by: DERMATOLOGY

## 2022-05-02 PROCEDURE — 17311 MOHS 1 STAGE H/N/HF/G: CPT | Mod: 76,S$PBB,, | Performed by: DERMATOLOGY

## 2022-05-02 PROCEDURE — 17312 MOHS ADDL STAGE: CPT | Mod: PBBFAC,PO | Performed by: DERMATOLOGY

## 2022-05-02 PROCEDURE — 17311: ICD-10-PCS | Mod: S$PBB,,, | Performed by: DERMATOLOGY

## 2022-05-02 PROCEDURE — 15260 PR FULL THICK GRFT NOS,EAR,LID <20 SQCM: ICD-10-PCS | Mod: 59,S$PBB,, | Performed by: DERMATOLOGY

## 2022-05-02 PROCEDURE — 17312: ICD-10-PCS | Mod: S$PBB,76,, | Performed by: DERMATOLOGY

## 2022-05-02 PROCEDURE — 99214 OFFICE O/P EST MOD 30 MIN: CPT | Mod: 25,S$PBB,, | Performed by: DERMATOLOGY

## 2022-05-02 PROCEDURE — 17312 MOHS ADDL STAGE: CPT | Mod: S$PBB,76,, | Performed by: DERMATOLOGY

## 2022-05-02 PROCEDURE — 15260 FTH/GFT FR N/E/E/L 20 SQCM/<: CPT | Mod: 59,PBBFAC,PO | Performed by: DERMATOLOGY

## 2022-05-02 PROCEDURE — 17311 MOHS 1 STAGE H/N/HF/G: CPT | Mod: 76,PBBFAC,PO | Performed by: DERMATOLOGY

## 2022-05-02 RX ORDER — DOXYCYCLINE 100 MG/1
100 CAPSULE ORAL 2 TIMES DAILY
Qty: 28 CAPSULE | Refills: 0 | Status: SHIPPED | OUTPATIENT
Start: 2022-05-02 | End: 2022-05-16

## 2022-05-02 NOTE — PROGRESS NOTES
Infiltrated the marked site with 2.5 ml of lidocaine 1% with epinephrine to the left nasal ala during the First stage of mohs surgery per order from Dr. CECILIA Bates. Patient experienced no adverse reactions post administration.  Infiltrated the marked site with 1.25 ml of lidocaine 1% with epinephrine to the left nasal ala during the Second stage of mohs surgery per order from Dr. CECILIA Bates. Patient experienced no adverse reactions post administration.   Infiltrated the marked site with 1.5 ml of lidocaine 1% with epinephrine to the left nasal ala during the Reconstruction stage of mohs surgery per order from Dr. CECILIA Bates. Patient experienced no adverse reactions post administration.    Lot #[id number]  NDC # 78034-132-60      Infiltrated the marked site with 0.75 ml of lidocaine 1% with epinephrine to the left posterior ear during the First stage of mohs surgery per order from Dr. CECILIA Bates. Patient experienced no adverse reactions post administration.  Infiltrated the marked site with 1.75 ml of lidocaine 1% with epinephrine to the left posterior ear during the Second stage of mohs surgery per order from Dr. CECILIA Bates. Patient experienced no adverse reactions post administration.   Infiltrated the marked site with 3 ml of lidocaine 1% with epinephrine to the left posterior ear during the Reconstruction stage of mohs surgery per order from Dr. CECILIA Bates. Patient experienced no adverse reactions post administration.    Lot #[id number]  NDC # 79225-741-23

## 2022-05-02 NOTE — PATIENT INSTRUCTIONS
It was a pleasure to see you today in clinic. Here is some helpful information regarding instructions following your surgery.      Stitches: will not dissolve on their own    Follow up:   * If you have a problem or concern post-operatively, please call ahead to schedule an appointment. We may not be able to accommodate a walk-in appointment *     Scars may take 3 months or longer to mature. If you have concerns about your scar at that point, please call our office to schedule a follow up appointment. There are options available to help improve the appearance.     Please continue wound care below once a day for 1 week:    WOUND CARE INSTRUCTIONS   *Washing your hands before touching your bandage and surgical site is extremely important to prevent post-operative infection*    1. Leave your pressure bandage on for 48 hours. You will not need to perform any wound care until this bandage is removed. Do NOT get bandage wet.     2. Wash your hands thoroughly before starting wound care.     3. After 48 hours, begin cleaning the surgery sites (nose and ear) every 48 hours with gentle soap (such as Cetaphil, Cerave or Dove). Use a Q-tip with the soap and water on it. Roll the Q-tip along incision line.     4. Dry the area with a fresh cotton tipped applicator/Q-tip.     5. Apply a generous amount of vaseline where you see the sutures on the nose. Avoid using Neosporin, or any bacterial ointment as this is likely to cause an allergic reaction to the site. Leave Vaseline gauze in place. Cover site with new bandage.     6. Once ear is clean, place a Duoderm dressing on top of open spot behind ear. Do NOT use Vaseline under bandage     7. You will be using gentle soap and water, vaseline (where applicable) and a bandage once every 48 hours for a  week.     8. After surgery, you may restart all of your medications that were stopped (if applicable).     *If your site is on your forehead, or near your eye, you will want to use ice  packs. Please apply ice packs every hour for 20 minutes while awake. Sleep elevated for the first two nights following surgery*     *For surgical areas on your arms/legs, try to keep the area elevated above the level of your heart as much as possible. Frequent gentle rubbing of your fingers or toes in that area will prevent numbness and stiffness*    *If located on your arm/hand, we ask that you do not lift anything heavier than a gallon of milk for two weeks*    *For surgical areas on your head/neck, do not bend over or stoop. Do not drop your head, as this increases blood to the surgical area and can induce bleeding*       BATHING: Begin bathing once pressure bandage comes off. Do not let direct water pressure hit the surgery site. It is okay if it gets wet, just let the water roll over.   PAIN: You may take Tylenol only for pain in the first 24 hours following surgery. Do not take any aspirin, Ibuprofen, Motrin or Aleve as this may increase your risk for bleeding for the first 24 hours. Significant pain/discomfort is unusual and should be reported to our office.   BLEEDING: A mild amount of blood on the bandage is expected. Blood soaking through the bandage is not normal. If this occurs, apply uninterrupted pressure for 20 minutes by the clock. If this does not stop the bleeding, hold pressure for 20 minutes with an ice pack. If it does not stop after ice, please call our office for additional assistance.       Normal office hour number: [phone]    After hours Dermatologist on call: [phone]

## 2022-05-02 NOTE — PROGRESS NOTES
REFERRING PROVIDER:  Dr. HORTENSIA Estrada    CHIEF COMPLAINT:  Established patient being consulted for Mohs' surgery evaluation.    HISTORY OF PRESENT ILLNESS:  67 y.o. male presents with a few year(s) history of growth on the 1) right cheek; 2) left nasal ala; 3) left posterior ear.    Negative for scabbing.  Negative for crusting.  Negative for bleeding.  Negative for itching.    Biopsy consistent with 1) SCCis; 2) nBCC; 3) nBCC.     No prior treatment.    Dr. Estrada's clinical documentation at time of biopsy reviewed today.     Pacemaker: No  Defibrillator: No  Artificial joints: No  Artificial heart valves: No    PAST MEDICAL HISTORY:  Past Medical History:   Diagnosis Date    Alcohol abuse     Carotid artery occlusion     COPD (chronic obstructive pulmonary disease)     Coronary artery disease     Depression     Diabetes mellitus     Hypertension     Hypokalemia     Hypomagnesemia     SHANELLE (obstructive sleep apnea)        PAST SURGICAL HISTORY:  Past Surgical History:   Procedure Laterality Date    HERNIA REPAIR      umbilical    LEFT HEART CATHETERIZATION Left 2/28/2022    Procedure: CATHETERIZATION, HEART, LEFT;  Surgeon: Jadiel Simons MD;  Location: [facility] CATH LAB;  Service: Cardiology;  Laterality: Left;        SOCIAL HISTORY:  Dependencies:  smokes (1 packs/day)    PERTINENT MEDICATIONS:  See medications list.  aspirin and Pletal    ALLERGIES:  Patient has no known allergies.    ROS:  Skin: See HPI      PE:  Physical Exam    General: Mood and affect normal. Alert and orient X3. Normal appearance.    Head/Face: 1) right cheek: erythematous scaly plaque  2) left nasal ala: pearly pink plaque  3) left posterior ear: pearly pink plaque      PATH:  (reviewed today)  Accession # [id number]  2. Skin, right cheek, shave biopsy:  -SQUAMOUS CELL CARCINOMA IN-SITU (INVOLVING HAIR FOLLICLES, EXTENDING TO A  PERIPHERAL BIOPSY EDGE AND THE DEEP BIOPSY EDGE  This lesion is skin cancer. You will be contacted regarding  treatment.  3. Skin, left nasal ala, shave biopsy:  -BASAL CELL CARCINOMA, NODULAR TYPE, EXTENDING TO THE PERIPHERAL AND DEEP BIOPSY  EDGES  This lesion is skin cancer. You will be contacted regarding treatment.  4. Skin, left posterior ear, shave biopsy:  -BASAL CELL CARCINOMA, NODULAR TYPE, EXTENDING TO THE PERIPHERAL AND DEEP BIOPSY EDGES    LABS:  (reviewed today)  Plts: [id number] (02/28/2022)    IMPRESSION:  1. 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 Biopsy proven nodular basal cell carcinoma of the left nasal ala  3. Biopsy proven nodular basal cell carcinoma of the left posterior ear      PLAN:  1. 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 Treatment options were reviewed, including Mohs Micrographic Surgery, radiation, topical therapy, and standard excision.  After careful review of patient's history and physical exam, and after discussion of treatment options, the decision was made to perform Mohs micrographic surgery.    Scheduled patient for Mohs Micrographic Surgery. Procedure scheduled for the near future. Risks, benefits, and alternatives of Mohs' surgery discussed with the patient. Discussed repair options including complex closure, skin flap, skin graft and second intention healing with the patient. Pre-operative instructions provided to the patient.      2. Biopsy proven nodular basal cell carcinoma of the left nasal ala  The diagnosis and the pathology report were discussed in detail with the patient. Treatment options were reviewed, including Mohs Micrographic Surgery, radiation, topical therapy, and standard excision.  After careful review of patient's history and physical exam, and after discussion of treatment options, the decision was made to perform Mohs micrographic surgery.    Scheduled patient for Mohs Micrographic Surgery. Procedure today. Risks, benefits,  and alternatives of Mohs' surgery discussed with the patient. Discussed repair options including complex closure, skin flap, skin graft and second intention healing with the patient. Pre-operative instructions provided to the patient.      3. Biopsy proven nodular basal cell carcinoma of the left posterior ear  The diagnosis and the pathology report were discussed in detail with the patient. Treatment options were reviewed, including Mohs Micrographic Surgery, radiation, topical therapy, and standard excision.  After careful review of patient's history and physical exam, and after discussion of treatment options, the decision was made to perform Mohs micrographic surgery.    Scheduled patient for Mohs Micrographic Surgery. Procedure today. Risks, benefits, and alternatives of Mohs' surgery discussed with the patient. Discussed repair options including complex closure, skin flap, skin graft and second intention healing with the patient. Pre-operative instructions provided to the patient.      Thank you for consulting with me in the care of this patient. The patient will be returning to you after the completion of the post-operative care.    Consulting report is sent to the consulting provider.

## 2022-05-02 NOTE — PROCEDURES
Procedure #1:  Date of Service: 05/02/2022  Surgery: Mohs micrographic surgery  Tumor Type: BCC  Location: left posterior ear  Mohs AUC: 8  Indication: tumor location  Repair Type: CLC (partial) with focal second intention healing  Repair Size: 4.5 cm  Suture Material: 4-0 monocryl; 5-0 Prolene  Derm-Path Accession #: [id number]  PreOp Size: 1.5 x 1.3 cm  PostOp Size: 2.2 x 2.4 cm  Mohs Accession #: [id number]  Level of Defect: perichondrium  Anesthesia volume: 2.5 cc (Mohs), 3 cc (Reconstruction)  Stages: 2     Surgeon and Pathologist: Dr. Alexandru Bates MD  Assistants: JING Feliz RN     All components of Universal Protocol/PAUSE Rule completed. Dr. Alexandru Bates MD operated in two distinct and integrated capacities as the surgeon and pathologist.     Procedure Details:  Stage 1:  The patient was placed supine on the operating table. The cancer/biopsy site was identified, outlined with a marker, and verified by the patient. A rim of normal appearing skin was marked circumferentially around the  lesion. The area was prepped with antiseptic. The area was infiltrated with local anesthesia (1% lidocaine with epinephrine 1:100,000). The tumor was first sharply debulked to remove clinically apparent tumor. A beveled incision following the standard Mohs approach was done and the specimen was removed.The layer of tissue was then transferred onto a specimen sheet maintaining the orientation of the specimen. Hemostasis was achieved with electrocoagulation, pressure and suture ligature as needed. The wound site was then covered with a pressure dressing while the tissue samples were processed for examination. The excised tissue was transported to the Mohs histology laboratory maintaining the tissue orientation. The tissue specimen was relaxed so that the entire surgical margin was in a a single horizontal plane for sectioning and inked for precise mapping. A precise reference map was drawn to reflect the sectioning of the  specimen, colored inking of the margins, and orientation on the patient. The tissue was processed using horizontal sectioning of the base and continuous peripheral margins. The histopathologic sections were reviewed in conjunction with the reference map.  Total tissue blocks: 1  Total slides: 4     Frozen section histology: Residual tumor identified on stage 1.   Histology: There were small irregular aggregates of  atypical basaloid cells with high nuclear cytoplasmic ratios and peripheral palisading of their nuclei in the subcutaneous fat  Depth of Invasion: fat.   Perineural Invasion: none.   Scar Tissue: absent.     Stage 2:  Residual tumor was appreciated at the deep margin of the tissue section on histologic exam. Therefore, an additional stage of Mohs surgery was performed. The area of residual tumor was identified on the patient. The layer of tissue was then surgically excised using a #15 blade and was then transferred onto a specimen sheet maintaining the orientation of the specimen. Hemostasis was achieved with electrocoagulation, pressure and suture ligature as needed. The wound site was then covered with a dressing while the tissue samples were processed for examination.  The excised tissue was transported to the Mohs histology laboratory maintaining the tissue orientation. The tissue specimen was relaxed so that the entire surgical margin was in a a single horizontal plane for sectioning and inked for precise mapping. A precise reference map  was drawn to reflect the sectioning of the specimen, colored inking of the margins, and orientation on the patient. The tissue was processed using horizontal sectioning of the base and continuous peripheral margins. The histopathologic sections were reviewed in conjunction with the reference map.  Total tissue blocks: 1  Total slides: 3     Frozen section histology: No residual tumor visualized.   Histology: There were no malignant cells seen in the sections  examined. Normal histologic structures noted.      No additional tumor was identified on microscopic examination, therefore Mohs surgery was complete.     Reconstruction: Complex Linear Closure   Primary Surgeon : MD Paulo  Assistant Surgeon: Maite Cool MA; JING Feliz RN  The patient was taken to the operative suite and placed supine on the operating room table. The defect was identified. Appropriate markings were made with a marking pen to plan the repair. The area was infiltrated with Lidocaine 1% with epinephrine 1:100,000 and prepped with iodine and draped with sterile towels. The wound was debeveled and undermined widely. Hemostasis was obtained using monopolar electrocoagulation. The wound was narrowed and the dermis and subcutaneous tissue were then approximated using buried vertical mattress sutures of 4-0 monocryl. Care was taken to orient tension vectors of the closure to minimize distortion of free margins. Cones of redundant tissue were then excised within relaxed skin tension lines on both sides of the defect. Additional buried sutures were placed in a similar fashion where needed. Percutaneous interrupted and running 5-0 prolene sutures were carefully placed for maximum eversion and meticulous approximation of the epidermis. A 1 x 1 cm area of the posterior ear at center of wound was allowed to heal by second intention.  Repair Size: 4.5 cm     The wound was cleansed with saline and ointment was applied along the wound surface. A duoderm dressing was placed over the area of second intention healing. A sterile pressure dressing was then applied. Wound care instructions were given verbally and in writing. The patient left the operating suite in stable condition. Patient was informed that additional refinement of the resulting surgical scar may be used as a second stage of this reconstruction.     1 week for suture removal      Procedure #2 :  Date of Service: 05/02/2022  Surgery: Mohs micrographic  surgery  Tumor Type: BCC  Location: left nasal ala  Mohs AUC: 8  Indication: tumor location  Repair Type: FTSG  Repair Size: 1.6 x 1.6 cm  Suture Material: 4-0 monocryl; 6-0 Prolene  Derm-Path Accession #: [id number]  PreOp Size: 0.9 x 0.7 cm  PostOp Size: 1.6 x 1.6 cm  Mohs Accession #: [id number]  Level of Defect: muscle  Anesthesia volume: 3.75 cc (Mohs), 1.5 cc (Reconstruction)  Stages: 2     Surgeon and Pathologist: Dr. Alexandru Bates MD  Assistants: JING Feliz RN     All components of Universal Protocol/PAUSE Rule completed. Dr. Alexandru Bates MD operated in two distinct and integrated capacities as the surgeon and pathologist.     Procedure Details:  Stage 1:  The patient was placed supine on the operating table. The cancer/biopsy site was identified, outlined with a marker, and verified by the patient. A rim of normal appearing skin was marked circumferentially around the  lesion. The area was prepped with antiseptic. The area was infiltrated with local anesthesia (1% lidocaine with epinephrine 1:100,000). The tumor was first sharply debulked to remove clinically apparent tumor. A beveled incision following the standard Mohs approach was done and the specimen was removed.The layer of tissue was then transferred onto a specimen sheet maintaining the orientation of the specimen. Hemostasis was achieved with electrocoagulation, pressure and suture ligature as needed. The wound site was then covered with a pressure dressing while the tissue samples were processed for examination. The excised tissue was transported to the Mohs histology laboratory maintaining the tissue orientation. The tissue specimen was relaxed so that the entire surgical margin was in a a single horizontal plane for sectioning and inked for precise mapping. A precise reference map was drawn to reflect the sectioning of the specimen, colored inking of the margins, and orientation on the patient. The tissue was processed using horizontal  sectioning of the base and continuous peripheral margins. The histopathologic sections were reviewed in conjunction with the reference map.  Total tissue blocks: 1  Total slides: 4     Frozen section histology: Residual tumor identified on stage 1.   Histology: There were small irregular aggregates of  atypical basaloid cells with high nuclear cytoplasmic ratios and peripheral palisading of their nuclei focally at the basal layer of the epidermis.   Depth of Invasion: epidermis.   Perineural Invasion: none.   Scar Tissue: absent.     Stage 2:  Residual tumor was appreciated at the peripheral margin of the tissue section on histologic exam. Therefore, an additional stage of Mohs surgery was performed. The area of residual tumor was identified on the patient. The layer of tissue was then surgically excised using a #15 blade and was then transferred onto a specimen sheet maintaining the orientation of the specimen. Hemostasis was achieved with electrocoagulation, pressure and suture ligature as needed. The wound site was then covered with a dressing while the tissue samples were processed for examination.  The excised tissue was transported to the Mohs histology laboratory maintaining the tissue orientation. The tissue specimen was relaxed so that the entire surgical margin was in a a single horizontal plane for sectioning and inked for precise mapping. A precise reference map  was drawn to reflect the sectioning of the specimen, colored inking of the margins, and orientation on the patient. The tissue was processed using horizontal sectioning of the base and continuous peripheral margins. The histopathologic sections were reviewed in conjunction with the reference map.  Total tissue blocks: 1  Total slides: 2     Frozen section histology: No residual tumor visualized.   Histology: There were no malignant cells seen in the sections examined. Normal histologic structures noted.      No additional tumor was identified on  microscopic examination, therefore Mohs surgery was complete.     Reconstruction: FTSG     Full Thickness Skin Graft with Complex Repair of Donor Site  PROCEDURE:  The patient was placed supine on the operating room table. The defect was measured and identified. A template was made. The postauricular left neck was chosen as graft donor site, as this had the closest color and texture match to the surrounding defect skin. The graft (1.6 x 1.6 cm) was harvested using a #15 blade by excising to the level of fat. The graft was placed on saline-soaked gauze. The recipient site was prepared for grafting. Hemostasis was achieved with electrocoagulation. Trimming fibrous-fatty tissue away with Iris scissors then thinned the graft. The graft was then placed over the defect and secured with prolene-anchoring sutures. The graft was trimmed to fit the defect and secured fully with tacking sutures. A xerform bolster was secured with a through and through suture through an external plastic strut using a 4-0 monocryl suture. Attention was then turned to repair of the donor site. The wound edges were approximated with deep dermal and subcutaneous sutures of 4-0 monocryl. Percutaneous 5-0 Prolene suture were then place for epidermal approximation. The graft and donor sites were cleansed with saline. An ointment and non-adherent pressure dressing was applied. Wound care instructions were given verbally and in writing. The patient left the operating room in stable condition. Sutures will be removed in one week.      RTC 1 week for suture removal    Alexandru Bates MD  Department of Dermatology, Mohs Surgery  2:52 PM  5/2/2022

## 2022-05-09 ENCOUNTER — PATIENT MESSAGE (OUTPATIENT)
Dept: SMOKING CESSATION | Facility: CLINIC | Age: 67
End: 2022-05-09
Payer: MEDICARE

## 2022-05-09 ENCOUNTER — CLINICAL SUPPORT (OUTPATIENT)
Dept: DERMATOLOGY | Facility: CLINIC | Age: 67
End: 2022-05-09
Payer: MEDICARE

## 2022-05-09 ENCOUNTER — PROCEDURE VISIT (OUTPATIENT)
Dept: DERMATOLOGY | Facility: CLINIC | Age: 67
End: 2022-05-09
Payer: MEDICARE

## 2022-05-09 VITALS — SYSTOLIC BLOOD PRESSURE: 158 MMHG | HEART RATE: 66 BPM | DIASTOLIC BLOOD PRESSURE: 78 MMHG

## 2022-05-09 DIAGNOSIS — D04.39 SQUAMOUS CELL CARCINOMA IN SITU (SCCIS) OF SKIN OF CHEEK: Primary | ICD-10-CM

## 2022-05-09 DIAGNOSIS — Z48.02 VISIT FOR SUTURE REMOVAL: Primary | ICD-10-CM

## 2022-05-09 PROCEDURE — 17311 MOHS 1 STAGE H/N/HF/G: CPT | Mod: S$PBB,79,, | Performed by: DERMATOLOGY

## 2022-05-09 PROCEDURE — 17312 MOHS ADDL STAGE: CPT | Mod: S$PBB,,, | Performed by: DERMATOLOGY

## 2022-05-09 PROCEDURE — 99499 NO LOS: ICD-10-PCS | Mod: S$PBB,,, | Performed by: DERMATOLOGY

## 2022-05-09 PROCEDURE — 17311 MOHS 1 STAGE H/N/HF/G: CPT | Mod: PBBFAC,PO | Performed by: DERMATOLOGY

## 2022-05-09 PROCEDURE — 13132 PR RECMPL WND HEAD,FAC,HAND 2.6-7.5 CM: ICD-10-PCS | Mod: S$PBB,51,79, | Performed by: DERMATOLOGY

## 2022-05-09 PROCEDURE — 99999 PR PBB SHADOW E&M-EST. PATIENT-LVL III: CPT | Mod: PBBFAC,,,

## 2022-05-09 PROCEDURE — 99999 PR PBB SHADOW E&M-EST. PATIENT-LVL III: ICD-10-PCS | Mod: PBBFAC,,,

## 2022-05-09 PROCEDURE — 17312 MOHS ADDL STAGE: CPT | Mod: PBBFAC,PO | Performed by: DERMATOLOGY

## 2022-05-09 PROCEDURE — 13132 CMPLX RPR F/C/C/M/N/AX/G/H/F: CPT | Mod: S$PBB,51,79, | Performed by: DERMATOLOGY

## 2022-05-09 PROCEDURE — 99499 UNLISTED E&M SERVICE: CPT | Mod: S$PBB,,, | Performed by: DERMATOLOGY

## 2022-05-09 PROCEDURE — 17312: ICD-10-PCS | Mod: S$PBB,,, | Performed by: DERMATOLOGY

## 2022-05-09 PROCEDURE — 13132 CMPLX RPR F/C/C/M/N/AX/G/H/F: CPT | Mod: PBBFAC,PO | Performed by: DERMATOLOGY

## 2022-05-09 PROCEDURE — 17311: ICD-10-PCS | Mod: S$PBB,79,, | Performed by: DERMATOLOGY

## 2022-05-09 PROCEDURE — 99213 OFFICE O/P EST LOW 20 MIN: CPT | Mod: PBBFAC,PO

## 2022-05-09 NOTE — PROGRESS NOTES
Patient presents for suture removal on the nose and ear. The wounds are well healed without signs of infection.  The sutures are removed. Dr. Bates visualized sites.  Vaseline applied to patient's nose and new duoderm applied on ear per order by Dr. Bates.  Wound care and activity instructions given.

## 2022-05-09 NOTE — PROCEDURES
Date of Service: 05/09/2022  Surgery: Mohs micrographic surgery  Tumor Type: SCCis  Location: right cheek  Mohs AUC: 8  Indication: tumor location, tumor size  Repair Type: CLC  Repair Size: 5.3 cm  Suture Material: 4-0 monocryl; 6-0 Prolene  Derm-Path Accession #: [id number]  PreOp Size: 2.8 x 1.9 cm  PostOp Size: 4.0 x 2.7 cm  Mohs Accession #: [id number]  Level of Defect: fat  Anesthesia volume: 2.5 cc (Mohs), 4 cc (Reconstruction)  Stages: 2     Surgeon and Pathologist: Dr. Alexandru Bates MD  Assistants: JING Feliz RN     All components of Universal Protocol/PAUSE Rule completed. Dr. Alexandru Bates MD operated in two distinct and integrated capacities as the surgeon and pathologist.     Procedure Details:  Stage 1:  The patient was placed supine on the operating table. The cancer/biopsy site was identified, outlined with a marker, and verified by the patient. A rim of normal appearing skin was marked circumferentially around the  lesion. The area was prepped with antiseptic. The area was infiltrated with local anesthesia (1% lidocaine with epinephrine 1:100,000). The tumor was first sharply debulked to remove clinically apparent tumor. A beveled incision following the standard Mohs approach was done and the specimen was removed.The layer of tissue was then transferred onto a specimen sheet maintaining the orientation of the specimen. Hemostasis was achieved with electrocoagulation, pressure and suture ligature as needed. The wound site was then covered with a pressure dressing while the tissue samples were processed for examination. The excised tissue was transported to the Mohs histology laboratory maintaining the tissue orientation. The tissue specimen was relaxed so that the entire surgical margin was in a a single horizontal plane for sectioning and inked for precise mapping. A precise reference map was drawn to reflect the sectioning of the specimen, colored inking of the margins, and orientation on  the patient. The tissue was processed using horizontal sectioning of the base and continuous peripheral margins. The histopathologic sections were reviewed in conjunction with the reference map.  Total tissue blocks: 1  Total slides: 6     Frozen section histology: Residual tumor identified on stage 1.   Histology: There were small irregular aggregates of atypical keratinocytes invading the full thickness of the epidermis focally at peripheral margin.   Depth of Invasion: epidermis.   Perineural Invasion: none.   Scar Tissue: absent.     Stage 2:  Residual tumor was appreciated at the peripheral margin of the tissue section on histologic exam. Therefore, an additional stage of Mohs surgery was performed. The area of residual tumor was identified on the patient. The layer of tissue was then surgically excised using a #15 blade and was then transferred onto a specimen sheet maintaining the orientation of the specimen. Hemostasis was achieved with electrocoagulation, pressure and suture ligature as needed. The wound site was then covered with a dressing while the tissue samples were processed for examination.  The excised tissue was transported to the Mohs histology laboratory maintaining the tissue orientation. The tissue specimen was relaxed so that the entire surgical margin was in a a single horizontal plane for sectioning and inked for precise mapping. A precise reference map  was drawn to reflect the sectioning of the specimen, colored inking of the margins, and orientation on the patient. The tissue was processed using horizontal sectioning of the base and continuous peripheral margins. The histopathologic sections were reviewed in conjunction with the reference map.  Total tissue blocks: 1  Total slides: 2     Frozen section histology: No residual tumor visualized.   Histology: There were no malignant cells seen in the sections examined. Normal histologic structures noted.      No additional tumor was  identified on microscopic examination, therefore Mohs surgery was complete.     Reconstruction: Complex Linear Closure   Primary Surgeon : MD Paulo  Assistant Surgeon: JING Feliz RN  The patient was taken to the operative suite and placed supine on the operating room table. The defect was identified. Appropriate markings were made with a marking pen to plan the repair. The area was infiltrated with Lidocaine 1% with epinephrine 1:100,000 and prepped with betadine and draped with sterile towels. The wound was debeveled and undermined widely. Hemostasis was obtained using monopolar electrocoagulation. The wound was narrowed and the dermis and subcutaneous tissue were then approximated using buried vertical mattress sutures of 4-0 monocryl. Care was taken to orient tension vectors of the closure to minimize distortion of free margins. Cones of redundant tissue were then excised within relaxed skin tension lines on both sides of the defect. Additional buried sutures were placed in a similar fashion where needed. Percutaneous interrupted and running 6-0 prolene sutures were carefully placed for maximum eversion and meticulous approximation of the epidermis.  Repair Size: 5.3 cm     The wound was cleansed with saline and ointment was applied along the wound surface. A sterile pressure dressing was applied. Wound care instructions were given verbally and in writing. The patient left the operating suite in stable condition. Patient was informed that additional refinement of the resulting surgical scar may be used as a second stage of this reconstruction.     RTC 1 week for suture removal    Alexandru Bates MD  Department of Dermatology, Mohs Surgery  11:23 AM  5/9/2022

## 2022-05-09 NOTE — PATIENT INSTRUCTIONS
It was a pleasure to see you today in clinic. Here is some helpful information regarding instructions following your surgery.      Stitches: will not dissolve on their own    Follow up:   * If you have a problem or concern post-operatively, please call ahead to schedule an appointment. We may not be able to accommodate a walk-in appointment *     Scars may take 3 months or longer to mature. If you have concerns about your scar at that point, please call our office to schedule a follow up appointment. There are options available to help improve the appearance.     Please continue wound care below once a day for 1 week:    WOUND CARE INSTRUCTIONS   *Washing your hands before touching your bandage and surgical site is extremely important to prevent post-operative infection*    1. Leave your pressure bandage on for 48 hours. You will not need to perform any wound care until this bandage is removed. Do NOT get bandage wet.     2. When you initially begin wound care, you may let the water hit the pressure bandage to loosen it from your skin.     3. Wash your hands thoroughly before starting wound care.     4. After 48 hours, begin cleaning the surgery site once daily with gentle soap (such as Cetaphil, Cerave or Dove). Use a Q-tip with the soap and water on it. Roll the Q-tip along incision line.     5. Dry the area with a fresh cotton tipped applicator/Q-tip.     6. Apply a generous amount of vaseline where you see the sutures. Avoid using Neosporin, or any bacterial ointment as this is likely to cause an allergic reaction to the site.     7. Cut a non-stick bandage to fit then use paper tape to hold in place.     8. You will be using gentle soap and water, vaseline and a bandage once daily for 1 week.     9. After surgery, you may restart all of your medications that were stopped (if applicable).     *If your site is on your forehead, or near your eye, you will want to use ice packs. Please apply ice packs every hour  for 20 minutes while awake. Sleep elevated for the first two nights following surgery*     *For surgical areas on your arms/legs, try to keep the area elevated above the level of your heart as much as possible. Frequent gentle rubbing of your fingers or toes in that area will prevent numbness and stiffness*    *If located on your arm/hand, we ask that you do not lift anything heavier than a gallon of milk for two weeks*    *For surgical areas on your head/neck, do not bend over or stoop. Do not drop your head, as this increases blood to the surgical area and can induce bleeding*       BATHING: Begin bathing once pressure bandage comes off. Do not let direct water pressure hit the surgery site. It is okay if it gets wet, just let the water roll over.   PAIN: You may take Tylenol only for pain in the first 24 hours following surgery. Do not take any aspirin, Ibuprofen, Motrin or Aleve as this may increase your risk for bleeding for the first 24 hours. Significant pain/discomfort is unusual and should be reported to our office.   BLEEDING: A mild amount of blood on the bandage is expected. Blood soaking through the bandage is not normal. If this occurs, apply uninterrupted pressure for 20 minutes by the clock. If this does not stop the bleeding, hold pressure for 20 minutes with an ice pack. If it does not stop after ice, please call our office for additional assistance.       Normal office hour number: [phone]    After hours Dermatologist on call: [phone]

## 2022-05-09 NOTE — PROGRESS NOTES
Infiltrated the marked site with 4 ml of lidocaine 1% with epinephrine to the right cheek during the Reconstruction stage of mohs surgery per order from Dr. CECILIA Bates. Patient experienced no adverse reactions post administration.    Lot #[id number]  NDC # 28176-800-44

## 2022-05-09 NOTE — PATIENT INSTRUCTIONS
Nose: Vaseline to site only     Ear: Duoderm to ear site every 48 hours. Keep site clean.  No vaseline beneath duoderm.

## 2022-05-16 ENCOUNTER — CLINICAL SUPPORT (OUTPATIENT)
Dept: DERMATOLOGY | Facility: CLINIC | Age: 67
End: 2022-05-16
Payer: MEDICARE

## 2022-05-16 DIAGNOSIS — Z48.02 VISIT FOR SUTURE REMOVAL: Primary | ICD-10-CM

## 2022-05-16 PROCEDURE — 99214 OFFICE O/P EST MOD 30 MIN: CPT | Mod: PBBFAC,PO

## 2022-05-16 PROCEDURE — 99999 PR PBB SHADOW E&M-EST. PATIENT-LVL IV: ICD-10-PCS | Mod: PBBFAC,,,

## 2022-05-16 PROCEDURE — 99999 PR PBB SHADOW E&M-EST. PATIENT-LVL IV: CPT | Mod: PBBFAC,,,

## 2022-05-16 NOTE — PROGRESS NOTES
Patient presents for suture removal. The wound is well healed without signs of infection.  The sutures are removed. Wound care and activity instructions given. Per Dr. Bates the patient should continue to apply duoderm to area behind the ear every 48 hours. Area should also be cleaned with soap and water.  Patient to return for follow up in 2 weeks.

## 2022-05-16 NOTE — PATIENT INSTRUCTIONS
Keep area behind ear clean with soap and water.   Apply duoderm to back of ear every 48 hours to wound behind ear. (No Vaseline)   Wash hand with soap and water while doing any wound care.  Follow up in 2 weeks.

## 2022-06-06 ENCOUNTER — OFFICE VISIT (OUTPATIENT)
Dept: PULMONOLOGY | Facility: CLINIC | Age: 67
End: 2022-06-06
Payer: MEDICARE

## 2022-06-06 ENCOUNTER — HOSPITAL ENCOUNTER (OUTPATIENT)
Dept: RADIOLOGY | Facility: HOSPITAL | Age: 67
Discharge: HOME | End: 2022-06-06
Attending: INTERNAL MEDICINE
Payer: MEDICARE

## 2022-06-06 VITALS
RESPIRATION RATE: 16 BRPM | HEIGHT: 76 IN | OXYGEN SATURATION: 96 % | HEART RATE: 65 BPM | WEIGHT: 237 LBS | SYSTOLIC BLOOD PRESSURE: 108 MMHG | BODY MASS INDEX: 28.86 KG/M2 | DIASTOLIC BLOOD PRESSURE: 74 MMHG

## 2022-06-06 DIAGNOSIS — F17.200 NICOTINE DEPENDENCE WITH CURRENT USE: ICD-10-CM

## 2022-06-06 DIAGNOSIS — Z72.0 TOBACCO ABUSE: ICD-10-CM

## 2022-06-06 DIAGNOSIS — J43.1 PANLOBULAR EMPHYSEMA: ICD-10-CM

## 2022-06-06 DIAGNOSIS — F17.210 NICOTINE DEPENDENCE, CIGARETTES, UNCOMPLICATED: Primary | ICD-10-CM

## 2022-06-06 DIAGNOSIS — J44.9 CHRONIC OBSTRUCTIVE PULMONARY DISEASE, UNSPECIFIED COPD TYPE: ICD-10-CM

## 2022-06-06 DIAGNOSIS — J43.2 CENTRILOBULAR EMPHYSEMA: ICD-10-CM

## 2022-06-06 PROCEDURE — 99999 PR PBB SHADOW E&M-EST. PATIENT-LVL V: ICD-10-PCS | Mod: PBBFAC,,, | Performed by: INTERNAL MEDICINE

## 2022-06-06 PROCEDURE — 99204 PR OFFICE/OUTPT VISIT, NEW, LEVL IV, 45-59 MIN: ICD-10-PCS | Mod: S$PBB,,, | Performed by: INTERNAL MEDICINE

## 2022-06-06 PROCEDURE — 71046 XR CHEST PA AND LATERAL: ICD-10-PCS | Mod: 26,,, | Performed by: RADIOLOGY

## 2022-06-06 PROCEDURE — 99204 OFFICE O/P NEW MOD 45 MIN: CPT | Mod: S$PBB,,, | Performed by: INTERNAL MEDICINE

## 2022-06-06 PROCEDURE — 99999 PR PBB SHADOW E&M-EST. PATIENT-LVL V: CPT | Mod: PBBFAC,,, | Performed by: INTERNAL MEDICINE

## 2022-06-06 PROCEDURE — 71046 X-RAY EXAM CHEST 2 VIEWS: CPT | Mod: 26,,, | Performed by: RADIOLOGY

## 2022-06-06 PROCEDURE — 99215 OFFICE O/P EST HI 40 MIN: CPT | Mod: PBBFAC,25 | Performed by: INTERNAL MEDICINE

## 2022-06-06 PROCEDURE — 71046 X-RAY EXAM CHEST 2 VIEWS: CPT | Mod: TC

## 2022-06-06 RX ORDER — ALBUTEROL SULFATE 90 UG/1
2 AEROSOL, METERED RESPIRATORY (INHALATION) EVERY 4 HOURS PRN
Qty: 18 G | Refills: 11 | Status: SHIPPED | OUTPATIENT
Start: 2022-06-06 | End: 2023-07-24 | Stop reason: SDUPTHER

## 2022-06-06 NOTE — ASSESSMENT & PLAN NOTE
Lung Cancer Screening Program:  Patient was counseled concerning benefits and limitations of lung cancer screening. There was shared decision making which included the following elements:    Determination of beneficiary eligibility including age, absence of signs or symptoms of lung cancer, a specific calculation of cigarette smoking pack-years; and if a former smoker, the number of years since quitting;  Shared decision making to include benefits and harms of screening, follow-up diagnostic testing, over-diagnosis, false positive rate, and total radiation exposure;  Counseling on the importance of adherence to annual lung cancer LDCT screening, impact of comorbidities and ability or willingness to undergo diagnosis and treatment;   Counseling on the importance of maintaining cigarette smoking abstinence if former smoker; or the importance of smoking cessation if current smoker and, if appropriate, furnishing of information about tobacco cessation interventions.

## 2022-06-06 NOTE — PROGRESS NOTES
"Subjective:       Patient ID: Lamine Fong is a 67 y.o. male.    Chief Complaint:   HPI COPD  He presents for evaluation and treatment of COPD. The patient is not currently have symptoms / an exacerbation. The patient has COPD for approximately 1 year. Symptoms in previous episodes have included dyspnea and wheezing, and typically last 2 weeks. Previous episodes have been exacerbated by strenuous activity. Current treatment includes none, which generally provides no relief of symptoms.   He uses 1 pillows at night. Patient currently is not on home oxygen therapy.. The patient is having no constitutional symptoms, denying fever, chills, anorexia, or weight loss. The patient has not been hospitalized for this condition before. He has a history of 66 pack years. The patient has no exercise limitations. Neuropathy in feet limits his exercis capacity  Still smoking  history of pneumonia . 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 There was shared decision making which included the following elements:    Determination of beneficiary eligibility including age, absence of signs or symptoms of lung cancer, a specific calculation of cigarette smoking pack-years; and if a former smoker, the number of years since quitting;  Shared decision making to include benefits and harms of screening, follow-up diagnostic testing, over-diagnosis, false " positive rate, and total radiation exposure;  Counseling on the importance of adherence to annual lung cancer LDCT screening, impact of comorbidities and ability or willingness to undergo diagnosis and treatment;   Counseling on the importance of maintaining cigarette smoking abstinence if former smoker; or the importance of smoking cessation if current smoker and, if appropriate, furnishing of information about tobacco cessation interventions.      Past Medical History:   Diagnosis Date    Alcohol abuse     Carotid artery occlusion     COPD (chronic obstructive pulmonary disease)     Coronary artery disease     Depression     Diabetes mellitus     Hypertension     Hypokalemia     Hypomagnesemia     SHANELLE (obstructive sleep apnea)      Past Surgical History:   Procedure Laterality Date    HERNIA REPAIR      umbilical    LEFT HEART CATHETERIZATION Left 2/28/2022    Procedure: CATHETERIZATION, HEART, LEFT;  Surgeon: Jadiel Simons MD;  Location: [facility] CATH LAB;  Service: Cardiology;  Laterality: Left;     Social History     Socioeconomic History    Marital status:    Tobacco Use    Smoking status: Current Every Day Smoker     Packs/day: 1.50     Years: 44.00     Pack years: 66.00     Types: Vaping with nicotine, Cigars     Start date: 1/1/1968    Smokeless tobacco: Current User   Substance and Sexual Activity    Alcohol use: Yes     Comment: 3-4 beers daily and 1/2 pint of vodka daily    Drug use: No    Sexual activity: Yes     Partners: Female     @FAM@  Review of Systems   Constitutional: Positive for fatigue. Negative for fever.   HENT: Positive for congestion. Negative for postnasal drip and rhinorrhea.    Eyes: Negative for redness and itching.   Respiratory: Positive for cough, shortness of breath, dyspnea on extertion, use of rescue inhaler and Paroxysmal Nocturnal Dyspnea. Negative for sputum production.    Cardiovascular: Negative for chest pain, palpitations and leg swelling.  "  Genitourinary: Negative for difficulty urinating and hematuria.   Endocrine: Negative for cold intolerance and heat intolerance.    Musculoskeletal: Positive for arthralgias.   Skin: Negative for rash.   Gastrointestinal: Negative for nausea and abdominal pain.   Neurological: Negative for dizziness, syncope, weakness and light-headedness.   Hematological: Negative for adenopathy. Does not bruise/bleed easily.   Psychiatric/Behavioral: Negative for sleep disturbance. The patient is not nervous/anxious.        Objective:      /74   Pulse 65   Resp 16   Ht 6' 4" (1.93 m)   Wt 107.5 kg (236 lb 15.9 oz)   SpO2 96%   BMI 28.85 kg/m²   Physical Exam  Vitals and nursing note reviewed.   Constitutional:       Appearance: He is well-developed.   HENT:      Head: Normocephalic and atraumatic.      Nose: Congestion present.   Eyes:      Conjunctiva/sclera: Conjunctivae normal.      Pupils: Pupils are equal, round, and reactive to light.   Neck:      Thyroid: No thyromegaly.      Vascular: No JVD.      Trachea: No tracheal deviation.   Cardiovascular:      Rate and Rhythm: Normal rate and regular rhythm.      Heart sounds: No murmur heard.  Pulmonary:      Breath sounds: Examination of the right-lower field reveals wheezing. Examination of the left-lower field reveals wheezing. Decreased breath sounds and wheezing present. No rhonchi or rales.   Abdominal:      General: Bowel sounds are normal.      Palpations: Abdomen is soft.   Musculoskeletal:         General: No tenderness. Normal range of motion.      Cervical back: Neck supple.   Lymphadenopathy:      Cervical: No cervical adenopathy.   Skin:     General: Skin is warm and dry.   Neurological:      Mental Status: He is alert and oriented to person, place, and time.       Personal Diagnostic Review  Chest X-Ray: I personally reviewed the films and findings are:, air trapping/emphysema  X-Ray Chest PA And Lateral  Narrative: EXAMINATION:  XR CHEST PA AND " LATERAL    CLINICAL HISTORY:  SOB; Centrilobular emphysema    TECHNIQUE:  PA and lateral views of the chest were performed.    COMPARISON:  10/02/2021.    FINDINGS:  No focal or confluent pulmonary infiltrate. No pleural fluid or pneumothorax.    The cardiomediastinal silhouette is stable with atherosclerotic calcifications of the aorta.    Degenerative changes of the shoulders and spine.  Impression: No acute cardiopulmonary abnormality.    Electronically signed by: Dandy Pastrana  Date:    06/06/2022  Time:    15:21    Pulmonary function tests:  No recent      Pulmonary Studies Review 6/6/2022 4/18/2022 3/8/2022 2/28/2022 2/28/2022 2/28/2022 2/28/2022   SpO2 96 64 98 98 100 99 97   Height 76 76 - - - - -   Weight 3791.91 3693.15 3446.23 - - - -   BMI (Calculated) 28.9 28.1 26.2 - - - -   Predicted Distance 346.01 350.5 368.1 - - - -   Predicted Distance Meters (Calculated) 616.43 621.36 - - - - -     No flowsheet data found.      Assessment:       1. Nicotine dependence, cigarettes, uncomplicated     2. Nicotine dependence with current use    3. Chronic obstructive pulmonary disease, unspecified COPD type    4. Tobacco abuse    5. Panlobular emphysema        Tobacco abuse  Lung Cancer Screening Program:  Patient was counseled concerning benefits and limitations of lung cancer screening. There was shared decision making which included the following elements:    Determination of beneficiary eligibility including age, absence of signs or symptoms of lung cancer, a specific calculation of cigarette smoking pack-years; and if a former smoker, the number of years since quitting;  Shared decision making to include benefits and harms of screening, follow-up diagnostic testing, over-diagnosis, false positive rate, and total radiation exposure;  Counseling on the importance of adherence to annual lung cancer LDCT screening, impact of comorbidities and ability or willingness to undergo diagnosis and treatment;   Counseling on  the importance of maintaining cigarette smoking abstinence if former smoker; or the importance of smoking cessation if current smoker and, if appropriate, furnishing of information about tobacco cessation interventions.      Outpatient Encounter Medications as of 6/6/2022   Medication Sig Dispense Refill    aspirin (ECOTRIN) 81 MG EC tablet Take 1 tablet (81 mg total) by mouth once daily.      BLOOD PRESSURE CUFF Misc 1 each by Misc.(Non-Drug; Combo Route) route 2 (two) times a day. 1 each 0    blood sugar diagnostic Strp To check BG 2 times daily, to use with insurance preferred meter 100 each 3    blood-glucose meter kit To check BG 2 times daily, to use with insurance preferred meter 1 each 0    cilostazoL (PLETAL) 100 MG Tab Take 1 tablet (100 mg total) by mouth 2 (two) times daily. 60 tablet 11    citalopram (CELEXA) 20 MG tablet Take 1 tablet (20 mg total) by mouth once daily. 90 tablet 3    fluorouraciL (EFUDEX) 5 % cream AAA bilateral arms bid x 4 weeks 40 g 1    folic acid (FOLVITE) 1 MG tablet Take 1 tablet (1 mg total) by mouth once daily. 30 tablet 11    gabapentin (NEURONTIN) 300 MG capsule Take 3 capsules (900 mg total) by mouth every evening. 90 capsule 11    lancets Misc To check BG 2 times daily, to use with insurance preferred meter 100 each 4    losartan (COZAAR) 100 MG tablet Take 1 tablet (100 mg total) by mouth once daily. 90 tablet 2    metFORMIN (GLUCOPHAGE) 1000 MG tablet Take 1 tablet (1,000 mg total) by mouth 2 (two) times daily with meals. 180 tablet 3    metoprolol succinate (TOPROL-XL) 25 MG 24 hr tablet Take 1 tablet (25 mg total) by mouth once daily. 30 tablet 5    simvastatin (ZOCOR) 20 MG tablet Take 1 tablet (20 mg total) by mouth every evening. 90 tablet 3    TYLENOL 325 mg tablet Take 650 mg by mouth every 6 (six) hours as needed.      VITAMIN B-1, MONONITRATE, 100 mg Tab Take 100 mg by mouth every morning.      VITAMIN B-12 1000 MCG tablet Take 1,000 mcg by  mouth every 7 days.      albuterol (PROVENTIL/VENTOLIN HFA) 90 mcg/actuation inhaler Inhale 2 puffs into the lungs every 4 (four) hours as needed for Wheezing or Shortness of Breath. 18 g 11    amLODIPine (NORVASC) 10 MG tablet Take 1 tablet (10 mg total) by mouth once daily. 90 tablet 1     No facility-administered encounter medications on file as of 6/6/2022.     Orders Placed This Encounter   Procedures    CT Chest Lung Screening Low Dose     Schedule follow up office visit after test.     Standing Status:   Future     Standing Expiration Date:   12/6/2023     Order Specific Question:   Does the patient show any signs or symptoms of lung cancer?     Answer:   No     Order Specific Question:   Is this the first (baseline) CT or an annual exam?     Answer:   Baseline [1]     Order Specific Question:   Is there documentation of shared decision making for this lung screening exam?     Answer:   Yes     Order Specific Question:   Access port per protocol?     Answer:   No     Order Specific Question:   Is this a low dose screening chest CT?     Answer:   Yes    Complete PFT with bronchodilator     Standing Status:   Future     Standing Expiration Date:   12/6/2023     Order Specific Question:   Release to patient     Answer:   Immediate     Plan:       Requested Prescriptions     Signed Prescriptions Disp Refills    albuterol (PROVENTIL/VENTOLIN HFA) 90 mcg/actuation inhaler 18 g 11     Sig: Inhale 2 puffs into the lungs every 4 (four) hours as needed for Wheezing or Shortness of Breath.     Nicotine dependence, cigarettes, uncomplicated   -     Complete PFT with bronchodilator; Future; Expected date: 06/06/2022  -     CT Chest Lung Screening Low Dose; Future; Expected date: 06/06/2022    Nicotine dependence with current use  -     CT Chest Lung Screening Low Dose; Future; Expected date: 06/06/2022    Chronic obstructive pulmonary disease, unspecified COPD type  -     Ambulatory referral/consult to Pulmonology  -      Complete PFT with bronchodilator; Future; Expected date: 06/06/2022  -     albuterol (PROVENTIL/VENTOLIN HFA) 90 mcg/actuation inhaler; Inhale 2 puffs into the lungs every 4 (four) hours as needed for Wheezing or Shortness of Breath.  Dispense: 18 g; Refill: 11    Tobacco abuse  -     CT Chest Lung Screening Low Dose; Future; Expected date: 06/06/2022    Panlobular emphysema  -     Complete PFT with bronchodilator; Future; Expected date: 06/06/2022      Follow up in about 1 year (around 6/6/2023) for CT/PET - ASAP, PFT on return.    MEDICAL DECISION MAKING: Moderate to high complexity.  Overall, the multiple problems listed are of moderate to high severity that may impact quality of life and activities of daily living. Side effects of medications, treatment plan as well as options and alternatives reviewed and discussed with patient. There was counseling of patient concerning these issues.    Total time spent in counseling and coordination of care - 45  minutes of total time spent on the encounter, which includes face to face time and non-face to face time preparing to see the patient (eg, review of tests), Obtaining and/or reviewing separately obtained history, Documenting clinical information in the electronic or other health record, Independently interpreting results (not separately reported) and communicating results to the patient/family/caregiver, or Care coordination (not separately reported).    Time was used in discussion of prognosis, risks, benefits of treatment, instructions and compliance with regimen . Discussion with other physicians and/or health care providers - home health or for use of durable medical equipment (oxygen, nebulizers, CPAP, BiPAP) occurred.

## 2022-06-13 ENCOUNTER — OFFICE VISIT (OUTPATIENT)
Dept: CARDIOLOGY | Facility: CLINIC | Age: 67
End: 2022-06-13
Payer: MEDICARE

## 2022-06-13 VITALS
HEART RATE: 74 BPM | SYSTOLIC BLOOD PRESSURE: 132 MMHG | BODY MASS INDEX: 28.97 KG/M2 | WEIGHT: 237.88 LBS | HEIGHT: 76 IN | OXYGEN SATURATION: 97 % | DIASTOLIC BLOOD PRESSURE: 72 MMHG

## 2022-06-13 DIAGNOSIS — I71.40 ABDOMINAL AORTIC ANEURYSM (AAA) WITHOUT RUPTURE: ICD-10-CM

## 2022-06-13 DIAGNOSIS — E11.621 TYPE 2 DIABETES MELLITUS WITH FOOT ULCER, WITHOUT LONG-TERM CURRENT USE OF INSULIN: Chronic | ICD-10-CM

## 2022-06-13 DIAGNOSIS — E11.69 HYPERLIPIDEMIA ASSOCIATED WITH TYPE 2 DIABETES MELLITUS: Chronic | ICD-10-CM

## 2022-06-13 DIAGNOSIS — E78.5 HYPERLIPIDEMIA ASSOCIATED WITH TYPE 2 DIABETES MELLITUS: Chronic | ICD-10-CM

## 2022-06-13 DIAGNOSIS — I73.9 PAD (PERIPHERAL ARTERY DISEASE): Primary | ICD-10-CM

## 2022-06-13 DIAGNOSIS — L97.509 TYPE 2 DIABETES MELLITUS WITH FOOT ULCER, WITHOUT LONG-TERM CURRENT USE OF INSULIN: Chronic | ICD-10-CM

## 2022-06-13 DIAGNOSIS — E78.2 MIXED HYPERLIPIDEMIA: ICD-10-CM

## 2022-06-13 DIAGNOSIS — I25.118 CORONARY ARTERY DISEASE OF NATIVE ARTERY OF NATIVE HEART WITH STABLE ANGINA PECTORIS: ICD-10-CM

## 2022-06-13 DIAGNOSIS — I15.2 HYPERTENSION ASSOCIATED WITH DIABETES: Chronic | ICD-10-CM

## 2022-06-13 DIAGNOSIS — Z72.0 TOBACCO ABUSE: ICD-10-CM

## 2022-06-13 DIAGNOSIS — E11.59 HYPERTENSION ASSOCIATED WITH DIABETES: Chronic | ICD-10-CM

## 2022-06-13 DIAGNOSIS — I72.3 ANEURYSM OF LEFT ILIAC ARTERY: ICD-10-CM

## 2022-06-13 PROCEDURE — 99999 PR PBB SHADOW E&M-EST. PATIENT-LVL V: CPT | Mod: PBBFAC,,, | Performed by: INTERNAL MEDICINE

## 2022-06-13 PROCEDURE — 99214 OFFICE O/P EST MOD 30 MIN: CPT | Mod: S$PBB,,, | Performed by: INTERNAL MEDICINE

## 2022-06-13 PROCEDURE — 99215 OFFICE O/P EST HI 40 MIN: CPT | Mod: PBBFAC | Performed by: INTERNAL MEDICINE

## 2022-06-13 PROCEDURE — 99214 PR OFFICE/OUTPT VISIT, EST, LEVL IV, 30-39 MIN: ICD-10-PCS | Mod: S$PBB,,, | Performed by: INTERNAL MEDICINE

## 2022-06-13 PROCEDURE — 99999 PR PBB SHADOW E&M-EST. PATIENT-LVL V: ICD-10-PCS | Mod: PBBFAC,,, | Performed by: INTERNAL MEDICINE

## 2022-06-13 NOTE — PROGRESS NOTES
Subjective:   Patient ID:  Lamine Fong is a 67 y.o. male who presents for follow up of Follow-up      68 yo male came in for 3 months f/u  PMH CAD h/o LHC showed distal LCX , orthostatic hypotension. DM > 5 yrs former heavy drinker and smoker, coronary atherosclerosis. PAD, AAA left iliac aneurysm, and carotid artery Dz.  In , c/o+ postaural dizziness when taking amlodipine, metorpolol and clonidine.   Now on amlodipine only for HTN and dizziness resolved.   Drinking issue on and off for 30 years. Pint vodka and beers daily, Off alcohol for 30 days,   Smoker 40 yrs 1ppd and quit 2 months ago, now vaping and patch   echo biv function;  Carotid US There is less than 50% diameter reduction on the right. There is 50-69% stenosis within the left proximal and mid ICA  Brain mild microvascular Dz   abd CT Coronary and aortic calcific atherosclerosis are noted. AAA 4.6 cm infra renal   Today EKG NSR    Good appetite, weight gain   No SOB chest pain dizziness faint   Claudication if walking a lot     LE arterial US showed bilaetral SAF pcclued and ABIs 0.54  MPI showed possible apical ischemia and normal EF  Leg calf muscle pain after walking for 10 min. No rest pain. The feet normal color and warm. PT DP pulse weak  Off Metoprolol deu to bradycardia,.   Added lipitor     visit  H/o LHC showed distal LCx 100% lesion and collateral circulation to LPDA. Continue medical Rx.  PAD with exertional claudication.  Right groin stable  No chest pain, leg swelling       Interval history  Leg pain dtarted from the knee and calf muscle, worse after walking. Has the pain at rest. No change after added Pletal. No leg and feet ulcer. F/u at podiatry  No chest pain. Chronic SOB. No palpitation dizziness faint       Past Medical History:   Diagnosis Date    Alcohol abuse     Carotid artery occlusion     COPD (chronic obstructive pulmonary disease)     Coronary artery disease     Depression      Diabetes mellitus     Hypertension     Hypokalemia     Hypomagnesemia     SHANELLE (obstructive sleep apnea)        Past Surgical History:   Procedure Laterality Date    HERNIA REPAIR      umbilical    LEFT HEART CATHETERIZATION Left 2/28/2022    Procedure: CATHETERIZATION, HEART, LEFT;  Surgeon: Jadiel Simons MD;  [facility] CATH LAB;  Service: Cardiology;  Laterality: Left;       Social History     Tobacco Use    Smoking status: Current Every Day Smoker     Packs/day: 1.50     Years: 44.00     Pack years: 66.00     Types: Vaping with nicotine, Cigars     Start date: 1/1/1968    Smokeless tobacco: Current User   Substance Use Topics    Alcohol use: Yes     Comment: 3-4 beers daily and 1/2 pint of vodka daily    Drug use: No       Family History   Problem Relation Age of Onset    Diabetes Mellitus Father          Review of Systems   Constitutional: Positive for malaise/fatigue. Negative for decreased appetite, diaphoresis, fever and night sweats.   HENT: Negative for nosebleeds.    Eyes: Negative for blurred vision and double vision.   Cardiovascular: Positive for claudication and dyspnea on exertion. Negative for chest pain, irregular heartbeat, leg swelling, near-syncope, orthopnea, palpitations, paroxysmal nocturnal dyspnea and syncope.   Respiratory: Negative for cough, shortness of breath, sleep disturbances due to breathing, snoring, sputum production and wheezing.    Endocrine: Negative for cold intolerance and polyuria.   Hematologic/Lymphatic: Does not bruise/bleed easily.   Skin: Negative for rash.   Musculoskeletal: Positive for back pain. Negative for falls, joint pain, joint swelling and neck pain.   Gastrointestinal: Negative for abdominal pain, heartburn, nausea and vomiting.   Genitourinary: Negative for dysuria, frequency and hematuria.   Neurological: Positive for dizziness. Negative for difficulty with concentration, focal weakness, headaches, light-headedness, numbness, seizures and weakness.    Psychiatric/Behavioral: Negative for depression, memory loss and substance abuse. The patient does not have insomnia.    Allergic/Immunologic: Negative for HIV exposure and hives.       Objective:   Physical Exam  HENT:      Head: Normocephalic.   Eyes:      Pupils: Pupils are equal, round, and reactive to light.   Neck:      Thyroid: No thyromegaly.      Vascular: Normal carotid pulses. No carotid bruit or JVD.   Cardiovascular:      Rate and Rhythm: Normal rate and regular rhythm.  No extrasystoles are present.     Chest Wall: PMI is not displaced.      Pulses: Normal pulses.      Heart sounds: Normal heart sounds. No murmur heard.    No gallop. No S3 sounds.   Pulmonary:      Effort: No respiratory distress.      Breath sounds: Normal breath sounds. No stridor.   Abdominal:      General: Bowel sounds are normal.      Palpations: Abdomen is soft.      Tenderness: There is no abdominal tenderness. There is no rebound.   Musculoskeletal:         General: Normal range of motion.   Skin:     Findings: No rash.   Neurological:      Mental Status: He is alert and oriented to person, place, and time.   Psychiatric:         Behavior: Behavior normal.         Lab Results   Component Value Date    CHOL 123 10/02/2021    CHOL 142 06/29/2020     Lab Results   Component Value Date    HDL 47 10/02/2021    HDL 41 06/29/2020     Lab Results   Component Value Date    LDLCALC 63.6 10/02/2021    LDLCALC 77 06/29/2020     Lab Results   Component Value Date    TRIG 62 10/02/2021    TRIG 118 06/29/2020     Lab Results   Component Value Date    CHOLHDL 38.2 10/02/2021    CHOLHDL 3.5 06/29/2020       Chemistry        Component Value Date/Time     02/28/2022 0814    K 4.2 02/28/2022 0814     02/28/2022 0814    CO2 27 02/28/2022 0814    BUN 19 02/28/2022 0814    CREATININE 0.9 02/28/2022 0814     (H) 02/28/2022 0814        Component Value Date/Time    CALCIUM 9.6 02/28/2022 0814    ALKPHOS 75 04/18/2022 1123    AST 12  04/18/2022 1123    ALT 14 04/18/2022 1123    BILITOT 0.3 04/18/2022 1123    ESTGFRAFRICA >60 02/28/2022 0814    EGFRNONAA >60 02/28/2022 0814          Lab Results   Component Value Date    HGBA1C 5.8 (H) 04/18/2022     Lab Results   Component Value Date    TSH 1.750 10/03/2021     Lab Results   Component Value Date    INR 0.9 02/28/2022     Lab Results   Component Value Date    WBC 9.08 02/28/2022    HGB 13.7 (L) 02/28/2022    HCT 42.1 02/28/2022    MCV 87 02/28/2022     02/28/2022     BMP  Sodium   Date Value Ref Range Status   02/28/2022 140 136 - 145 mmol/L Final     Potassium   Date Value Ref Range Status   02/28/2022 4.2 3.5 - 5.1 mmol/L Final     Chloride   Date Value Ref Range Status   02/28/2022 104 95 - 110 mmol/L Final     CO2   Date Value Ref Range Status   02/28/2022 27 23 - 29 mmol/L Final     BUN   Date Value Ref Range Status   02/28/2022 19 8 - 23 mg/dL Final     Creatinine   Date Value Ref Range Status   02/28/2022 0.9 0.5 - 1.4 mg/dL Final     Calcium   Date Value Ref Range Status   02/28/2022 9.6 8.7 - 10.5 mg/dL Final     Anion Gap   Date Value Ref Range Status   02/28/2022 9 8 - 16 mmol/L Final     eGFR if    Date Value Ref Range Status   02/28/2022 >60 >60 mL/min/1.73 m^2 Final     eGFR if non    Date Value Ref Range Status   02/28/2022 >60 >60 mL/min/1.73 m^2 Final     Comment:     Calculation used to obtain the estimated glomerular filtration  rate (eGFR) is the CKD-EPI equation.        BNP  @LABRCNTIP(BNP,BNPTRIAGEBLO)@  @LABRCNTIP(troponini)@  CrCl cannot be calculated (Patient's most recent lab result is older than the maximum 7 days allowed.).  No results found in the last 24 hours.  No results found in the last 24 hours.  No results found in the last 24 hours.    Assessment:      1. PAD (peripheral artery disease)    2. Coronary artery disease of native artery of native heart with stable angina pectoris    3. Hypertension associated with diabetes     4. Hyperlipidemia associated with type 2 diabetes mellitus    5. Abdominal aortic aneurysm (AAA) without rupture    6. Aneurysm of left iliac artery    7. Mixed hyperlipidemia    8. Type 2 diabetes mellitus with foot ulcer, without long-term current use of insulin    9. Tobacco abuse      Claudication at rest and progressively worse    Plan:   Refer to PAD clinic with Dr. Simons    Continue ASA Pletal Amlodipine metoprolol losartan and statin  Smoking cessation  DM Rx per PCP  Counseled DASH  Check Lipid profile in 6 months  Recommend heart-healthy diet, weight control and regular exercise.  Melva. Risk modification.   I have reviewed all pertinent labs and cardiac studies independently. Plans and recommendations have been formulated under my direct supervision. All questions answered and patient voiced understanding.   If symptoms persist go to the ED  RTC in 6 months    03/02/2023 addendum  Plan to have left fem bypass   Elevated periop risk of CV events for non-high risk procedure.  Ok to proceed the scheduled surgery without further cardiac study.  OK to hold Aspirin and Pletal 5 to 7 days before the procedure and resume ASAP postop.

## 2022-07-25 DIAGNOSIS — I25.118 CORONARY ARTERY DISEASE OF NATIVE ARTERY OF NATIVE HEART WITH STABLE ANGINA PECTORIS: Primary | ICD-10-CM

## 2022-07-25 RX ORDER — AMLODIPINE BESYLATE 10 MG/1
10 TABLET ORAL DAILY
Qty: 90 TABLET | Refills: 3 | Status: SHIPPED | OUTPATIENT
Start: 2022-07-25 | End: 2023-08-15

## 2022-07-29 ENCOUNTER — OFFICE VISIT (OUTPATIENT)
Dept: CARDIOLOGY | Facility: CLINIC | Age: 67
End: 2022-07-29
Payer: MEDICARE

## 2022-07-29 ENCOUNTER — PATIENT MESSAGE (OUTPATIENT)
Dept: CARDIOLOGY | Facility: CLINIC | Age: 67
End: 2022-07-29
Payer: MEDICARE

## 2022-07-29 ENCOUNTER — TELEPHONE (OUTPATIENT)
Dept: RADIOLOGY | Facility: HOSPITAL | Age: 67
End: 2022-07-29
Payer: MEDICARE

## 2022-07-29 VITALS
SYSTOLIC BLOOD PRESSURE: 156 MMHG | BODY MASS INDEX: 30.58 KG/M2 | OXYGEN SATURATION: 95 % | HEART RATE: 65 BPM | WEIGHT: 251.13 LBS | HEIGHT: 76 IN | DIASTOLIC BLOOD PRESSURE: 73 MMHG

## 2022-07-29 DIAGNOSIS — I71.40 ABDOMINAL AORTIC ANEURYSM (AAA) WITHOUT RUPTURE: ICD-10-CM

## 2022-07-29 DIAGNOSIS — E11.621 TYPE 2 DIABETES MELLITUS WITH FOOT ULCER, WITHOUT LONG-TERM CURRENT USE OF INSULIN: Chronic | ICD-10-CM

## 2022-07-29 DIAGNOSIS — I25.118 CORONARY ARTERY DISEASE OF NATIVE ARTERY OF NATIVE HEART WITH STABLE ANGINA PECTORIS: ICD-10-CM

## 2022-07-29 DIAGNOSIS — Z72.0 TOBACCO ABUSE: ICD-10-CM

## 2022-07-29 DIAGNOSIS — I10 PRIMARY HYPERTENSION: ICD-10-CM

## 2022-07-29 DIAGNOSIS — I73.9 PAD (PERIPHERAL ARTERY DISEASE): Primary | ICD-10-CM

## 2022-07-29 DIAGNOSIS — E78.5 HYPERLIPIDEMIA ASSOCIATED WITH TYPE 2 DIABETES MELLITUS: Chronic | ICD-10-CM

## 2022-07-29 DIAGNOSIS — L97.509 TYPE 2 DIABETES MELLITUS WITH FOOT ULCER, WITHOUT LONG-TERM CURRENT USE OF INSULIN: Chronic | ICD-10-CM

## 2022-07-29 DIAGNOSIS — R00.1 BRADYCARDIA: Primary | ICD-10-CM

## 2022-07-29 DIAGNOSIS — E11.621 TYPE 2 DIABETES MELLITUS WITH FOOT ULCER, WITHOUT LONG-TERM CURRENT USE OF INSULIN: Primary | ICD-10-CM

## 2022-07-29 DIAGNOSIS — G62.9 NEUROPATHY: Chronic | ICD-10-CM

## 2022-07-29 DIAGNOSIS — B35.1 ONYCHOMYCOSIS OF MULTIPLE TOENAILS WITH TYPE 2 DIABETES MELLITUS AND PERIPHERAL NEUROPATHY: Chronic | ICD-10-CM

## 2022-07-29 DIAGNOSIS — I95.1 ORTHOSTATIC SYNCOPE: ICD-10-CM

## 2022-07-29 DIAGNOSIS — I73.9 PAD (PERIPHERAL ARTERY DISEASE): ICD-10-CM

## 2022-07-29 DIAGNOSIS — E11.69 ONYCHOMYCOSIS OF MULTIPLE TOENAILS WITH TYPE 2 DIABETES MELLITUS AND PERIPHERAL NEUROPATHY: Chronic | ICD-10-CM

## 2022-07-29 DIAGNOSIS — I72.3 ANEURYSM OF LEFT ILIAC ARTERY: ICD-10-CM

## 2022-07-29 DIAGNOSIS — I50.32 CHRONIC DIASTOLIC (CONGESTIVE) HEART FAILURE: ICD-10-CM

## 2022-07-29 DIAGNOSIS — E11.42 ONYCHOMYCOSIS OF MULTIPLE TOENAILS WITH TYPE 2 DIABETES MELLITUS AND PERIPHERAL NEUROPATHY: Chronic | ICD-10-CM

## 2022-07-29 DIAGNOSIS — R94.39 ABNORMAL NUCLEAR STRESS TEST: ICD-10-CM

## 2022-07-29 DIAGNOSIS — E78.2 MIXED HYPERLIPIDEMIA: ICD-10-CM

## 2022-07-29 DIAGNOSIS — K80.20 CHOLELITHIASIS WITHOUT CHOLECYSTITIS: ICD-10-CM

## 2022-07-29 DIAGNOSIS — L97.509 TYPE 2 DIABETES MELLITUS WITH FOOT ULCER, WITHOUT LONG-TERM CURRENT USE OF INSULIN: Primary | ICD-10-CM

## 2022-07-29 DIAGNOSIS — E11.69 HYPERLIPIDEMIA ASSOCIATED WITH TYPE 2 DIABETES MELLITUS: Chronic | ICD-10-CM

## 2022-07-29 DIAGNOSIS — J43.1 PANLOBULAR EMPHYSEMA: ICD-10-CM

## 2022-07-29 DIAGNOSIS — F10.11 HISTORY OF ALCOHOL ABUSE: ICD-10-CM

## 2022-07-29 DIAGNOSIS — I65.23 BILATERAL CAROTID ARTERY STENOSIS: ICD-10-CM

## 2022-07-29 PROCEDURE — 99215 OFFICE O/P EST HI 40 MIN: CPT | Mod: PBBFAC | Performed by: INTERNAL MEDICINE

## 2022-07-29 PROCEDURE — 99205 PR OFFICE/OUTPT VISIT, NEW, LEVL V, 60-74 MIN: ICD-10-PCS | Mod: S$PBB,,, | Performed by: INTERNAL MEDICINE

## 2022-07-29 PROCEDURE — 99999 PR PBB SHADOW E&M-EST. PATIENT-LVL V: ICD-10-PCS | Mod: PBBFAC,,, | Performed by: INTERNAL MEDICINE

## 2022-07-29 PROCEDURE — 99999 PR PBB SHADOW E&M-EST. PATIENT-LVL V: CPT | Mod: PBBFAC,,, | Performed by: INTERNAL MEDICINE

## 2022-07-29 PROCEDURE — 99205 OFFICE O/P NEW HI 60 MIN: CPT | Mod: S$PBB,,, | Performed by: INTERNAL MEDICINE

## 2022-07-29 NOTE — PROGRESS NOTES
Subjective:   Patient ID:  Lamine Fong is a 67 y.o. male who presents for evaluation of No chief complaint on file.      HPI  66 yo male came in for 3 months f/u  PMH CAD h/o LHC showed distal LCX , orthostatic hypotension. DM > 5 yrs former heavy drinker and smoker, coronary atherosclerosis. PAD, AAA left iliac aneurysm, and carotid artery Dz.  In , c/o+ postaural dizziness when taking amlodipine, metorpolol and clonidine.   Now on amlodipine only for HTN and dizziness resolved.   Drinking issue on and off for 30 years. Pint vodka and beers daily, Off alcohol for 30 days,   Smoker 40 yrs 1ppd and quit 2 months ago, now vaping and patch   echo biv function;  Carotid US There is less than 50% diameter reduction on the right. There is 50-69% stenosis within the left proximal and mid ICA  Brain mild microvascular Dz   abd CT Coronary and aortic calcific atherosclerosis are noted. AAA 4.6 cm infra renal   Today EKG NSR     Good appetite, weight gain   No SOB chest pain dizziness faint   Claudication if walking a lot      LE arterial US showed bilaetral SAF pcclued and ABIs 0.54  MPI showed possible apical ischemia and normal EF  Leg calf muscle pain after walking for 10 min. No rest pain. The feet normal color and warm. PT DP pulse weak  Off Metoprolol deu to bradycardia,.   Added lipitor      visit  H/o LHC showed distal LCx 100% lesion and collateral circulation to LPDA. Continue medical Rx.  PAD with exertional claudication.  Right groin stable  No chest pain, leg swelling         Interval history  Leg pain dtarted from the knee and calf muscle, worse after walking. Has the pain at rest. No change after added Pletal. No leg and feet ulcer. F/u at podiatry  No chest pain. Chronic SOB. No palpitation dizziness faint        Referred from DR NJ for  Pvd. He has known aaa measuring 4.6 cm on 10/21 has left iliac dissection has significant pvd of both lower extremity with severe  limiting claudication has cad with severe distal lcx disease and  left pda with collaterals. He has carotid disease moderate range asymptomatic has neuropathy symptoms from diabetes managed with gabapentin . He is not smoking he has real limiting claudication in both calves affecting his lifestyle and ability to walk and exercise. He sees podiatry for foot care denies any discoloration ulcerations or gangrene.  Past Medical History:   Diagnosis Date    Alcohol abuse     Carotid artery occlusion     COPD (chronic obstructive pulmonary disease)     Coronary artery disease     Depression     Diabetes mellitus     Hypertension     Hypokalemia     Hypomagnesemia     SHANELLE (obstructive sleep apnea)        Past Surgical History:   Procedure Laterality Date    HERNIA REPAIR      umbilical    LEFT HEART CATHETERIZATION Left 2/28/2022    Procedure: CATHETERIZATION, HEART, LEFT;  Surgeon: Jadiel Simons MD;  Location: [facility] CATH LAB;  Service: Cardiology;  Laterality: Left;       Social History     Tobacco Use    Smoking status: Current Every Day Smoker     Packs/day: 1.50     Years: 44.00     Pack years: 66.00     Types: Vaping with nicotine, Cigars     Start date: 1/1/1968    Smokeless tobacco: Current User   Substance Use Topics    Alcohol use: Yes     Comment: 3-4 beers daily and 1/2 pint of vodka daily    Drug use: No       Family History   Problem Relation Age of Onset    Diabetes Mellitus Father        Current Outpatient Medications   Medication Sig    albuterol (PROVENTIL/VENTOLIN HFA) 90 mcg/actuation inhaler Inhale 2 puffs into the lungs every 4 (four) hours as needed for Wheezing or Shortness of Breath.    amLODIPine (NORVASC) 10 MG tablet Take 1 tablet (10 mg total) by mouth once daily.    aspirin (ECOTRIN) 81 MG EC tablet Take 1 tablet (81 mg total) by mouth once daily.    BLOOD PRESSURE CUFF Misc 1 each by Misc.(Non-Drug; Combo Route) route 2 (two) times a day.    blood sugar diagnostic  Strp To check BG 2 times daily, to use with insurance preferred meter    blood-glucose meter kit To check BG 2 times daily, to use with insurance preferred meter    cilostazoL (PLETAL) 100 MG Tab Take 1 tablet (100 mg total) by mouth 2 (two) times daily.    citalopram (CELEXA) 20 MG tablet Take 1 tablet (20 mg total) by mouth once daily.    fluorouraciL (EFUDEX) 5 % cream AAA bilateral arms bid x 4 weeks    folic acid (FOLVITE) 1 MG tablet Take 1 tablet (1 mg total) by mouth once daily.    gabapentin (NEURONTIN) 300 MG capsule Take 3 capsules (900 mg total) by mouth every evening.    lancets Misc To check BG 2 times daily, to use with insurance preferred meter    losartan (COZAAR) 100 MG tablet Take 1 tablet (100 mg total) by mouth once daily.    metFORMIN (GLUCOPHAGE) 1000 MG tablet Take 1 tablet (1,000 mg total) by mouth 2 (two) times daily with meals.    metoprolol succinate (TOPROL-XL) 25 MG 24 hr tablet Take 1 tablet (25 mg total) by mouth once daily.    simvastatin (ZOCOR) 20 MG tablet Take 1 tablet (20 mg total) by mouth every evening.    TYLENOL 325 mg tablet Take 650 mg by mouth every 6 (six) hours as needed.    VITAMIN B-1, MONONITRATE, 100 mg Tab Take 100 mg by mouth every morning.    VITAMIN B-12 1000 MCG tablet Take 1,000 mcg by mouth every 7 days.     No current facility-administered medications for this visit.     Current Outpatient Medications on File Prior to Visit   Medication Sig    albuterol (PROVENTIL/VENTOLIN HFA) 90 mcg/actuation inhaler Inhale 2 puffs into the lungs every 4 (four) hours as needed for Wheezing or Shortness of Breath.    amLODIPine (NORVASC) 10 MG tablet Take 1 tablet (10 mg total) by mouth once daily.    aspirin (ECOTRIN) 81 MG EC tablet Take 1 tablet (81 mg total) by mouth once daily.    BLOOD PRESSURE CUFF Misc 1 each by Misc.(Non-Drug; Combo Route) route 2 (two) times a day.    blood sugar diagnostic Strp To check BG 2 times daily, to use with insurance  preferred meter    blood-glucose meter kit To check BG 2 times daily, to use with insurance preferred meter    cilostazoL (PLETAL) 100 MG Tab Take 1 tablet (100 mg total) by mouth 2 (two) times daily.    citalopram (CELEXA) 20 MG tablet Take 1 tablet (20 mg total) by mouth once daily.    fluorouraciL (EFUDEX) 5 % cream AAA bilateral arms bid x 4 weeks    folic acid (FOLVITE) 1 MG tablet Take 1 tablet (1 mg total) by mouth once daily.    gabapentin (NEURONTIN) 300 MG capsule Take 3 capsules (900 mg total) by mouth every evening.    lancets Misc To check BG 2 times daily, to use with insurance preferred meter    losartan (COZAAR) 100 MG tablet Take 1 tablet (100 mg total) by mouth once daily.    metFORMIN (GLUCOPHAGE) 1000 MG tablet Take 1 tablet (1,000 mg total) by mouth 2 (two) times daily with meals.    metoprolol succinate (TOPROL-XL) 25 MG 24 hr tablet Take 1 tablet (25 mg total) by mouth once daily.    simvastatin (ZOCOR) 20 MG tablet Take 1 tablet (20 mg total) by mouth every evening.    TYLENOL 325 mg tablet Take 650 mg by mouth every 6 (six) hours as needed.    VITAMIN B-1, MONONITRATE, 100 mg Tab Take 100 mg by mouth every morning.    VITAMIN B-12 1000 MCG tablet Take 1,000 mcg by mouth every 7 days.     No current facility-administered medications on file prior to visit.       Review of patient's allergies indicates:  No Known Allergies    Review of Systems   Constitutional: Negative for malaise/fatigue.   Eyes: Negative for blurred vision.   Cardiovascular: Positive for claudication. Negative for chest pain, cyanosis, dyspnea on exertion, irregular heartbeat, leg swelling, near-syncope, orthopnea, palpitations and paroxysmal nocturnal dyspnea.   Respiratory: Negative for cough, hemoptysis and shortness of breath.    Hematologic/Lymphatic: Negative for bleeding problem. Does not bruise/bleed easily.   Skin: Negative for dry skin and itching.   Musculoskeletal: Negative for falls, muscle  weakness and myalgias.   Gastrointestinal: Negative for abdominal pain, diarrhea, heartburn, hematemesis, hematochezia and melena.   Genitourinary: Negative for flank pain and hematuria.   Neurological: Positive for numbness and paresthesias. Negative for dizziness, focal weakness, headaches, light-headedness, seizures and weakness.   Psychiatric/Behavioral: Negative for altered mental status and memory loss. The patient is not nervous/anxious.    Allergic/Immunologic: Negative for hives.       Objective:   Physical Exam  Vitals and nursing note reviewed.   Constitutional:       General: He is not in acute distress.     Appearance: He is well-developed. He is not diaphoretic.   HENT:      Head: Normocephalic and atraumatic.   Eyes:      General:         Right eye: No discharge.         Left eye: No discharge.      Pupils: Pupils are equal, round, and reactive to light.   Neck:      Thyroid: No thyromegaly.      Vascular: No JVD.   Cardiovascular:      Rate and Rhythm: Normal rate and regular rhythm.      Pulses: Intact distal pulses.           Carotid pulses are 2+ on the right side with bruit and 2+ on the left side with bruit.       Radial pulses are 2+ on the right side and 2+ on the left side.        Femoral pulses are 1+ on the right side with bruit and 1+ on the left side with bruit.       Popliteal pulses are 0 on the right side and 0 on the left side.        Dorsalis pedis pulses are 0 on the right side and 0 on the left side.        Posterior tibial pulses are 0 on the right side and 0 on the left side.      Heart sounds: Normal heart sounds. No murmur heard.    No friction rub. No gallop.      Comments: No abdominal bruit  Pulmonary:      Effort: Pulmonary effort is normal. No respiratory distress.      Breath sounds: Normal breath sounds. No wheezing or rales.   Chest:      Chest wall: No tenderness.   Abdominal:      General: Bowel sounds are normal. There is no distension.      Palpations: Abdomen is  "soft.      Tenderness: There is no abdominal tenderness.   Musculoskeletal:         General: Normal range of motion.      Cervical back: Neck supple.      Right lower leg: No edema.      Left lower leg: No edema.   Skin:     General: Skin is warm and dry.      Findings: No erythema or rash.   Neurological:      Mental Status: He is alert and oriented to person, place, and time.      Cranial Nerves: No cranial nerve deficit.   Psychiatric:         Behavior: Behavior normal.         Judgment: Judgment normal.       Vitals:    07/29/22 0810 07/29/22 0811   BP: (!) 151/71 (!) 156/73   BP Location: Left arm Right arm   Patient Position: Sitting Sitting   BP Method: Medium (Manual) Medium (Manual)   Pulse: 65    SpO2: 95%    Weight: 113.9 kg (251 lb 1.7 oz)    Height: 6' 4" (1.93 m)      Lab Results   Component Value Date    CHOL 123 10/02/2021    CHOL 142 06/29/2020     Lab Results   Component Value Date    HDL 47 10/02/2021    HDL 41 06/29/2020     Lab Results   Component Value Date    LDLCALC 63.6 10/02/2021    LDLCALC 77 06/29/2020     Lab Results   Component Value Date    TRIG 62 10/02/2021    TRIG 118 06/29/2020     Lab Results   Component Value Date    CHOLHDL 38.2 10/02/2021    CHOLHDL 3.5 06/29/2020       Chemistry        Component Value Date/Time     02/28/2022 0814    K 4.2 02/28/2022 0814     02/28/2022 0814    CO2 27 02/28/2022 0814    BUN 19 02/28/2022 0814    CREATININE 0.9 02/28/2022 0814     (H) 02/28/2022 0814        Component Value Date/Time    CALCIUM 9.6 02/28/2022 0814    ALKPHOS 75 04/18/2022 1123    AST 12 04/18/2022 1123    ALT 14 04/18/2022 1123    BILITOT 0.3 04/18/2022 1123    ESTGFRAFRICA >60 02/28/2022 0814    EGFRNONAA >60 02/28/2022 0814        Lab Results   Component Value Date    HGBA1C 5.8 (H) 04/18/2022       Lab Results   Component Value Date    TSH 1.750 10/03/2021     Lab Results   Component Value Date    INR 0.9 02/28/2022     Lab Results   Component Value Date    " WBC 9.08 02/28/2022    HGB 13.7 (L) 02/28/2022    HCT 42.1 02/28/2022    MCV 87 02/28/2022     02/28/2022     BNP  @LABRCNTIP(BNP,BNPTRIAGEBLO)@  CrCl cannot be calculated (Patient's most recent lab result is older than the maximum 7 days allowed.).     Conclusion    · RLE arterial US showed 75 to 99% lesion of CFA profunda A and ostial SFA; occluded pSFA and collateral circulation reconstitution on distal SFA with distal monophasic waveform. Occluded OSVALDO  · LLE occluded SFA with reconstitution collaterla circulation on popliteal artery with distal monophasic waveform. Occluded OSVALDO and 75 to 99% lesion of profunda A.  · Bilateral ABIs 0.54 suggested severe arterial disease    Conclusion    · Patient was not exercised on treadmill due to severe resting HERBERTH.  · HERBERTH: Severely abnormal B LE.    Conclusion         Possible mild apical ischemia noted.    There is a  mild intensity fixed perfusion abnormality in the inferior wall of the left ventricle secondary to diaphragm attenuation.    The gated perfusion images showed an ejection fraction of 52% at rest. The gated perfusion images showed an ejection fraction of 59% post stress.    The EKG portion of this study is negative for ischemia.    The patient reported no chest pain during the stress test.    There were no arrhythmias during stress.  IMPRESSION:   Bilateral carotid atheromatous plaquing. There is less than 50% diameter reduction on the right. There is 50-69% stenosis within the left proximal and mid ICA, as demonstrated by B-mode and Doppler findings.     Electronically signed by Marycarmen Mo MD on 12/6/2021 10:34 AM    Assessment:     1. PAD (peripheral artery disease)    2. Neuropathy    3. History of alcohol abuse    4. Onychomycosis of multiple toenails with type 2 diabetes mellitus and peripheral neuropathy    5. Panlobular emphysema    6. Abdominal aortic aneurysm (AAA) without rupture    7. Abnormal nuclear stress test    8. Aneurysm of  left iliac artery    9. Bilateral carotid artery stenosis    10. Chronic diastolic (congestive) heart failure    11. Coronary artery disease of native artery of native heart with stable angina pectoris    12. Hyperlipidemia associated with type 2 diabetes mellitus    13. Mixed hyperlipidemia    14. Orthostatic syncope    15. Primary hypertension    16. Type 2 diabetes mellitus with foot ulcer, without long-term current use of insulin    17. Tobacco abuse    18. Cholelithiasis without cholecystitis      aaa measuring 4.6 cm will repeat cta abdomen continue antiplatelets statins and rf modification  Diabetic neuropathy managed with gabapentin stable continue same    htn needs better control counseled about slat intake defer to DR NJ  HLP ON STATINS LIPIDS ON TARGET CONTINUE SAME    DIABETES A1C ON TARGET CONTINUE THE SAME    PVD WITH CLAUDICATION WITH MULTILEVEL DISEASE  VERY SYMPTOMATIC AND LIFESTYLE LIMITING DESPITE HIM TRYING TO EXERCISE. HAS AFFECTED HIS ABILITY TO WALK REHAB LUNG ADDRESS EXERCISE FOR CAD.I HAVE REVIEWED NON INVASIVE EVAL AGREE WITH INTERPRETATION. NEEDS CTA WITH RUN OFF  CAD ON APPROPRIATE THERAPY ASYMPTOMATIC PER DR NJ     CAROTID STENOSIS IN MODERATE RANGE NEEDS REPEAT US.   Plan:   cta abdomen with run off   Carotid us    phone review and decide f/u discussed with DR NJ  LOW FAT LOW SALT DIET.

## 2022-07-30 ENCOUNTER — LAB VISIT (OUTPATIENT)
Dept: LAB | Facility: HOSPITAL | Age: 67
End: 2022-07-30
Attending: INTERNAL MEDICINE
Payer: MEDICARE

## 2022-07-30 DIAGNOSIS — I95.1 ORTHOSTATIC SYNCOPE: ICD-10-CM

## 2022-07-30 DIAGNOSIS — R00.1 BRADYCARDIA: ICD-10-CM

## 2022-07-30 DIAGNOSIS — R06.09 DOE (DYSPNEA ON EXERTION): ICD-10-CM

## 2022-07-30 DIAGNOSIS — I73.9 PAD (PERIPHERAL ARTERY DISEASE): ICD-10-CM

## 2022-07-30 DIAGNOSIS — I25.118 CORONARY ARTERY DISEASE OF NATIVE ARTERY OF NATIVE HEART WITH STABLE ANGINA PECTORIS: ICD-10-CM

## 2022-07-30 DIAGNOSIS — I71.40 ABDOMINAL AORTIC ANEURYSM (AAA) WITHOUT RUPTURE: ICD-10-CM

## 2022-07-30 LAB
ANION GAP SERPL CALC-SCNC: 11 MMOL/L (ref 8–16)
BUN SERPL-MCNC: 16 MG/DL (ref 8–23)
CALCIUM SERPL-MCNC: 9.9 MG/DL (ref 8.7–10.5)
CHLORIDE SERPL-SCNC: 100 MMOL/L (ref 95–110)
CO2 SERPL-SCNC: 27 MMOL/L (ref 23–29)
CREAT SERPL-MCNC: 1.2 MG/DL (ref 0.5–1.4)
CREAT SERPL-MCNC: 1.2 MG/DL (ref 0.5–1.4)
EST. GFR  (AFRICAN AMERICAN): >60 ML/MIN/1.73 M^2
EST. GFR  (AFRICAN AMERICAN): >60 ML/MIN/1.73 M^2
EST. GFR  (NON AFRICAN AMERICAN): >60 ML/MIN/1.73 M^2
EST. GFR  (NON AFRICAN AMERICAN): >60 ML/MIN/1.73 M^2
GLUCOSE SERPL-MCNC: 218 MG/DL (ref 70–110)
POTASSIUM SERPL-SCNC: 4.6 MMOL/L (ref 3.5–5.1)
SODIUM SERPL-SCNC: 138 MMOL/L (ref 136–145)

## 2022-07-30 PROCEDURE — 36415 COLL VENOUS BLD VENIPUNCTURE: CPT | Performed by: INTERNAL MEDICINE

## 2022-07-30 PROCEDURE — 80048 BASIC METABOLIC PNL TOTAL CA: CPT | Performed by: NURSE PRACTITIONER

## 2022-07-30 PROCEDURE — 82565 ASSAY OF CREATININE: CPT | Performed by: INTERNAL MEDICINE

## 2022-08-01 ENCOUNTER — HOSPITAL ENCOUNTER (OUTPATIENT)
Dept: RADIOLOGY | Facility: HOSPITAL | Age: 67
Discharge: HOME | End: 2022-08-01
Attending: INTERNAL MEDICINE
Payer: MEDICARE

## 2022-08-01 ENCOUNTER — TELEPHONE (OUTPATIENT)
Dept: CARDIOLOGY | Facility: CLINIC | Age: 67
End: 2022-08-01
Payer: MEDICARE

## 2022-08-01 DIAGNOSIS — I71.40 AAA (ABDOMINAL AORTIC ANEURYSM) WITHOUT RUPTURE: Primary | ICD-10-CM

## 2022-08-01 DIAGNOSIS — I71.40 ABDOMINAL AORTIC ANEURYSM (AAA) WITHOUT RUPTURE: ICD-10-CM

## 2022-08-01 DIAGNOSIS — I65.23 BILATERAL CAROTID ARTERY STENOSIS: ICD-10-CM

## 2022-08-01 DIAGNOSIS — I73.9 PAD (PERIPHERAL ARTERY DISEASE): ICD-10-CM

## 2022-08-01 PROCEDURE — 75635 CT ANGIO ABDOMINAL ARTERIES: CPT | Mod: TC,PN

## 2022-08-01 PROCEDURE — 25500020 PHARM REV CODE 255: Mod: PN | Performed by: INTERNAL MEDICINE

## 2022-08-01 RX ADMIN — IOHEXOL 125 ML: 350 INJECTION, SOLUTION INTRAVENOUS at 09:08

## 2022-08-01 NOTE — TELEPHONE ENCOUNTER
This pt needs to see vascular for aaa repair. Karuna can you please help me with this.    ----- Message from Jadiel Simons MD sent at 8/1/2022 12:20 PM CDT -----  His aneurysm is 5 cm needs to see vascular surgery.  Has a lesion in gallbladder and kidney needs to see pcp to address these issues.

## 2022-08-01 NOTE — TELEPHONE ENCOUNTER
Pt son notified and verbalized understanding advised that may need to see his pcp in re to high sugars pb    ----- Message from MARK Fritz sent at 8/1/2022 10:35 AM CDT -----  Please notify patient    Renal function stable  Glucose on higher side  Potassium WNL    Thanks  Ya

## 2022-08-01 NOTE — TELEPHONE ENCOUNTER
Pt son called on behalf of father. Notified the son of the following as well as referral made to vascular surgery. son verbalized understanding to the following pb    ----- Message from Jadiel Simons MD sent at 8/1/2022 12:20 PM CDT -----  His aneurysm is 5 cm needs to see vascular surgery.  Has a lesion in gallbladder and kidney needs to see pcp to address these issues.

## 2022-08-02 ENCOUNTER — TELEPHONE (OUTPATIENT)
Dept: CARDIOLOGY | Facility: HOSPITAL | Age: 67
End: 2022-08-02
Payer: MEDICARE

## 2022-08-02 NOTE — TELEPHONE ENCOUNTER
Staff message sent to Dr. Sanford to see if he would like to have the patient scheduled.   for  AAA consult. His aneurysm is 5 cm needs to see vascular surgery.  Once he responds then we will reach out the patient according to the advice of the provider.

## 2022-08-08 ENCOUNTER — TELEPHONE (OUTPATIENT)
Dept: CARDIOLOGY | Facility: CLINIC | Age: 67
End: 2022-08-08
Payer: MEDICARE

## 2022-08-08 ENCOUNTER — HOSPITAL ENCOUNTER (OUTPATIENT)
Dept: CARDIOLOGY | Facility: HOSPITAL | Age: 67
Discharge: HOME | End: 2022-08-08
Attending: INTERNAL MEDICINE
Payer: MEDICARE

## 2022-08-08 VITALS
WEIGHT: 251 LBS | BODY MASS INDEX: 30.56 KG/M2 | SYSTOLIC BLOOD PRESSURE: 145 MMHG | DIASTOLIC BLOOD PRESSURE: 66 MMHG | HEIGHT: 76 IN

## 2022-08-08 DIAGNOSIS — I73.9 PAD (PERIPHERAL ARTERY DISEASE): ICD-10-CM

## 2022-08-08 DIAGNOSIS — I71.40 ABDOMINAL AORTIC ANEURYSM (AAA) WITHOUT RUPTURE: ICD-10-CM

## 2022-08-08 DIAGNOSIS — I65.23 BILATERAL CAROTID ARTERY STENOSIS: ICD-10-CM

## 2022-08-08 DIAGNOSIS — I65.23 BILATERAL CAROTID ARTERY STENOSIS: Primary | ICD-10-CM

## 2022-08-08 LAB
LEFT ARM DIASTOLIC BLOOD PRESSURE: 66 MMHG
LEFT ARM SYSTOLIC BLOOD PRESSURE: 154 MMHG
LEFT CBA DIAS: 5 CM/S
LEFT CBA SYS: 72 CM/S
LEFT CCA DIST DIAS: 4 CM/S
LEFT CCA DIST SYS: 69 CM/S
LEFT CCA MID DIAS: 6 CM/S
LEFT CCA MID SYS: 59 CM/S
LEFT CCA PROX DIAS: 6 CM/S
LEFT CCA PROX SYS: 94 CM/S
LEFT ECA DIAS: 10 CM/S
LEFT ECA SYS: 322 CM/S
LEFT ICA DIST DIAS: 14 CM/S
LEFT ICA DIST SYS: 70 CM/S
LEFT ICA MID DIAS: 34 CM/S
LEFT ICA MID SYS: 184 CM/S
LEFT ICA PROX DIAS: 209 CM/S
LEFT ICA PROX SYS: 518 CM/S
LEFT VERTEBRAL DIAS: 11 CM/S
LEFT VERTEBRAL SYS: 72 CM/S
OHS CV CAROTID RIGHT ICA EDV HIGHEST: 26
OHS CV CAROTID ULTRASOUND LEFT ICA/CCA RATIO: 7.51
OHS CV CAROTID ULTRASOUND RIGHT ICA/CCA RATIO: 1.61
OHS CV PV CAROTID LEFT HIGHEST CCA: 94
OHS CV PV CAROTID LEFT HIGHEST ICA: 518
OHS CV PV CAROTID RIGHT HIGHEST CCA: 94
OHS CV PV CAROTID RIGHT HIGHEST ICA: 140
OHS CV US CAROTID LEFT HIGHEST EDV: 209
RIGHT ARM DIASTOLIC BLOOD PRESSURE: 73 MMHG
RIGHT ARM SYSTOLIC BLOOD PRESSURE: 143 MMHG
RIGHT CBA DIAS: 6 CM/S
RIGHT CBA SYS: 68 CM/S
RIGHT CCA DIST DIAS: 5 CM/S
RIGHT CCA DIST SYS: 87 CM/S
RIGHT CCA MID DIAS: 5 CM/S
RIGHT CCA MID SYS: 85 CM/S
RIGHT CCA PROX DIAS: 4 CM/S
RIGHT CCA PROX SYS: 94 CM/S
RIGHT ECA DIAS: 3 CM/S
RIGHT ECA SYS: 166 CM/S
RIGHT ICA DIST DIAS: 22 CM/S
RIGHT ICA DIST SYS: 87 CM/S
RIGHT ICA MID DIAS: 26 CM/S
RIGHT ICA MID SYS: 140 CM/S
RIGHT ICA PROX DIAS: 14 CM/S
RIGHT ICA PROX SYS: 89 CM/S
RIGHT VERTEBRAL DIAS: 10 CM/S
RIGHT VERTEBRAL SYS: 46 CM/S

## 2022-08-08 PROCEDURE — 93880 CV US DOPPLER CAROTID (CUPID ONLY): ICD-10-PCS | Mod: 26,,, | Performed by: INTERNAL MEDICINE

## 2022-08-08 PROCEDURE — 93880 EXTRACRANIAL BILAT STUDY: CPT | Mod: 26,,, | Performed by: INTERNAL MEDICINE

## 2022-08-08 PROCEDURE — 93880 EXTRACRANIAL BILAT STUDY: CPT | Mod: PO

## 2022-08-08 NOTE — TELEPHONE ENCOUNTER
LVM for patient to call me back----- Message from Mansi Yoder sent at 8/8/2022  2:46 PM CDT -----  Regarding: returned call  Contact: patient  Patient is returning a call, please call them back at  [phone]

## 2022-08-08 NOTE — TELEPHONE ENCOUNTER
Spoke to patient's son and scheduled CTA for 8/11/22 in [place] at 9am----- Message from Jadiel Simons MD sent at 8/8/2022  2:09 PM CDT -----  Marcela  He has severe left carotid stenosis critical in the 80-99% range. He needs to have a cta of carotid.  I alreasy asked for vascular surgery to see him for his aneurysm please make sure he has an appointment asa ap   Orders for cta carotids being placed.   thxs

## 2022-08-08 NOTE — TELEPHONE ENCOUNTER
LVM on both number for patient to call back regarding test results----- Message from Jadiel Simons MD sent at 8/8/2022  2:09 PM CDT -----  Marcela  He has severe left carotid stenosis critical in the 80-99% range. He needs to have a cta of carotid.  I alreasy asked for vascular surgery to see him for his aneurysm please make sure he has an appointment asa ap   Orders for cta carotids being placed.   thxs

## 2022-08-11 ENCOUNTER — HOSPITAL ENCOUNTER (OUTPATIENT)
Dept: RADIOLOGY | Facility: HOSPITAL | Age: 67
Discharge: HOME | End: 2022-08-11
Attending: INTERNAL MEDICINE
Payer: MEDICARE

## 2022-08-11 ENCOUNTER — TELEPHONE (OUTPATIENT)
Dept: CARDIOLOGY | Facility: CLINIC | Age: 67
End: 2022-08-11
Payer: MEDICARE

## 2022-08-11 DIAGNOSIS — I65.23 BILATERAL CAROTID ARTERY STENOSIS: ICD-10-CM

## 2022-08-11 PROCEDURE — 25500020 PHARM REV CODE 255: Mod: PN | Performed by: INTERNAL MEDICINE

## 2022-08-11 PROCEDURE — 70496 CT ANGIOGRAPHY HEAD: CPT | Mod: TC,PN

## 2022-08-11 RX ADMIN — IOHEXOL 100 ML: 350 INJECTION, SOLUTION INTRAVENOUS at 09:08

## 2022-08-11 NOTE — TELEPHONE ENCOUNTER
Patient aware of blockage and seeing vascular surgery today----- Message from Jadiel Simons MD sent at 8/11/2022 10:40 AM CDT -----  Significant blockage left carotid see vascular he has orders already.

## 2022-08-11 NOTE — TELEPHONE ENCOUNTER
Sending message to Palmira Balderas to get patient scheduled----- Message from Jadiel Simons MD sent at 8/11/2022  3:30 PM CDT -----  He needs to see vascular   ----- Message -----  From: Marcela Noyola LPN  Sent: 8/11/2022  11:02 AM CDT  To: Jadiel Simons MD    Can you add another referral for cardiothoric surgery for the carotid since the other referral was only for the AAA?. He is seeing Dr. Ray today  ----- Message -----  From: Jadiel Simons MD  Sent: 8/11/2022  10:40 AM CDT  To: Kristyn ERNST Staff    Significant blockage left carotid see vascular he has orders already.

## 2022-08-24 DIAGNOSIS — E11.9 TYPE 2 DIABETES MELLITUS WITHOUT COMPLICATION: ICD-10-CM

## 2022-08-31 ENCOUNTER — INITIAL CONSULT (OUTPATIENT)
Dept: VASCULAR SURGERY | Facility: CLINIC | Age: 67
End: 2022-08-31
Payer: MEDICARE

## 2022-08-31 VITALS
TEMPERATURE: 96 F | WEIGHT: 249.56 LBS | SYSTOLIC BLOOD PRESSURE: 125 MMHG | HEART RATE: 55 BPM | BODY MASS INDEX: 30.38 KG/M2 | DIASTOLIC BLOOD PRESSURE: 62 MMHG

## 2022-08-31 DIAGNOSIS — I71.40 ABDOMINAL AORTIC ANEURYSM (AAA) 3.0 CM TO 5.5 CM IN DIAMETER IN MALE: ICD-10-CM

## 2022-08-31 DIAGNOSIS — I65.22 CAROTID STENOSIS, ASYMPTOMATIC, LEFT: Primary | ICD-10-CM

## 2022-08-31 PROCEDURE — 99999 PR PBB SHADOW E&M-EST. PATIENT-LVL V: CPT | Mod: PBBFAC,,, | Performed by: SURGERY

## 2022-08-31 PROCEDURE — 99205 OFFICE O/P NEW HI 60 MIN: CPT | Mod: S$PBB,,, | Performed by: SURGERY

## 2022-08-31 PROCEDURE — 99215 OFFICE O/P EST HI 40 MIN: CPT | Mod: PBBFAC,PN | Performed by: SURGERY

## 2022-08-31 PROCEDURE — 99205 PR OFFICE/OUTPT VISIT, NEW, LEVL V, 60-74 MIN: ICD-10-PCS | Mod: S$PBB,,, | Performed by: SURGERY

## 2022-08-31 PROCEDURE — 99999 PR PBB SHADOW E&M-EST. PATIENT-LVL V: ICD-10-PCS | Mod: PBBFAC,,, | Performed by: SURGERY

## 2022-08-31 RX ORDER — SODIUM CHLORIDE 0.9 % (FLUSH) 0.9 %
10 SYRINGE (ML) INJECTION
Status: DISCONTINUED | OUTPATIENT
Start: 2022-08-31 | End: 2022-12-05

## 2022-08-31 RX ORDER — MUPIROCIN 20 MG/G
OINTMENT TOPICAL
Status: DISCONTINUED | OUTPATIENT
Start: 2022-08-31 | End: 2022-12-05

## 2022-08-31 RX ORDER — LIDOCAINE HYDROCHLORIDE 10 MG/ML
1 INJECTION, SOLUTION EPIDURAL; INFILTRATION; INTRACAUDAL; PERINEURAL ONCE
Status: DISCONTINUED | OUTPATIENT
Start: 2022-08-31 | End: 2022-12-05

## 2022-08-31 NOTE — PROGRESS NOTES
The [facility] Vascular Surgery  Congenital Cardiovascular Surgery  Consult Note    Patient Name: Lamine Fong  MRN: [id number]  Admission Date: (Not on file)  Hospital Length of Stay: 0 days   Attending Physician: No att. providers found  Primary Care Provider: Carlitos Michelle MD    Patient information was obtained from patient.     Consults  Subjective:     Chief Complaint critical left internal carotid artery stenosis, infrarenal abdominal aortic aneurysm    History of Present Illness:  67-year-old male referred from cardiology for evaluation of an asymptomatic critical left internal carotid artery stenosis and an asymptomatic 5 cm infrarenal abdominal aortic aneurysm.  These were found on workup for his bilateral lower extremity claudication.  Denies previous heart attack or stroke.  Is currently on aspirin and statin therapy.    Current Outpatient Medications   Medication    albuterol (PROVENTIL/VENTOLIN HFA) 90 mcg/actuation inhaler    amLODIPine (NORVASC) 10 MG tablet    aspirin (ECOTRIN) 81 MG EC tablet    BLOOD PRESSURE CUFF Misc    blood sugar diagnostic Strp    blood-glucose meter kit    cilostazoL (PLETAL) 100 MG Tab    citalopram (CELEXA) 20 MG tablet    fluorouraciL (EFUDEX) 5 % cream    folic acid (FOLVITE) 1 MG tablet    gabapentin (NEURONTIN) 300 MG capsule    lancets Misc    losartan (COZAAR) 100 MG tablet    metFORMIN (GLUCOPHAGE) 1000 MG tablet    metoprolol succinate (TOPROL-XL) 25 MG 24 hr tablet    simvastatin (ZOCOR) 20 MG tablet    TYLENOL 325 mg tablet    VITAMIN B-1, MONONITRATE, 100 mg Tab    VITAMIN B-12 1000 MCG tablet     No current facility-administered medications for this visit.       Review of patient's allergies indicates:  No Known Allergies    Past Medical History:   Diagnosis Date    Alcohol abuse     Carotid artery occlusion     COPD (chronic obstructive pulmonary disease)     Coronary artery disease     Depression     Diabetes mellitus     Hypertension     Hypokalemia      Hypomagnesemia     SHANELLE (obstructive sleep apnea)      Past Surgical History:   Procedure Laterality Date    HERNIA REPAIR      umbilical    LEFT HEART CATHETERIZATION Left 2022    Procedure: CATHETERIZATION, HEART, LEFT;  Surgeon: Jadiel Simons MD;  Location: [facility] CATH LAB;  Service: Cardiology;  Laterality: Left;     Family History       Problem Relation (Age of Onset)    Diabetes Mellitus Father          Tobacco Use    Smoking status: Former     Packs/day: 1.50     Years: 44.00     Pack years: 66.00     Types: Vaping with nicotine, Cigars, Cigarettes     Start date: 1968     Quit date: 2021     Years since quittin.7    Smokeless tobacco: Current   Substance and Sexual Activity    Alcohol use: Yes     Comment: 3-4 beers daily and 1/2 pint of vodka daily    Drug use: No    Sexual activity: Yes     Partners: Female     Review of Systems   Constitutional:  Negative for activity change, appetite change, fatigue and fever.   HENT:  Negative for congestion.    Eyes:  Negative for photophobia, redness and visual disturbance.   Respiratory:  Negative for apnea, cough, chest tightness and shortness of breath.    Cardiovascular:  Negative for chest pain and leg swelling.   Gastrointestinal:  Negative for abdominal pain, nausea and vomiting.   Genitourinary:  Negative for difficulty urinating.   Musculoskeletal:  Negative for gait problem and myalgias.   Skin:  Negative for color change, rash and wound.   Neurological:  Negative for syncope, facial asymmetry, speech difficulty, weakness and numbness.   Objective:     Vital Signs (Most Recent):  Temp: 96.4 °F (35.8 °C) (22)  Pulse: (!) 55 (22)  BP: 125/62 (22)   Vital Signs (24h Range):  [unfilled]     Weight: 113.2 kg (249 lb 9 oz)  Body mass index is 30.38 kg/m².            [unfilled]    Physical Exam  Vitals and nursing note reviewed.   Constitutional:       Appearance: Normal appearance. He is normal weight.   HENT:       Head: Normocephalic and atraumatic.      Mouth/Throat:      Mouth: Mucous membranes are moist.   Eyes:      Extraocular Movements: Extraocular movements intact.      Conjunctiva/sclera: Conjunctivae normal.      Pupils: Pupils are equal, round, and reactive to light.   Neck:      Vascular: Carotid bruit present.   Cardiovascular:      Rate and Rhythm: Normal rate and regular rhythm.      Pulses: Normal pulses.   Pulmonary:      Effort: Pulmonary effort is normal.      Breath sounds: Normal breath sounds.   Abdominal:      General: Abdomen is flat. Bowel sounds are normal.      Palpations: Abdomen is soft. There is pulsatile mass.   Musculoskeletal:      Cervical back: Neck supple.   Skin:     General: Skin is warm.      Capillary Refill: Capillary refill takes 2 to 3 seconds.   Neurological:      General: No focal deficit present.      Mental Status: He is alert and oriented to person, place, and time. Mental status is at baseline.      Cranial Nerves: No cranial nerve deficit.      Sensory: No sensory deficit.      Motor: No weakness.   Psychiatric:         Mood and Affect: Mood normal.         Behavior: Behavior normal.       Significant Labs:  I have reviewed all pertinent lab results within the past 24 hours.    Significant Diagnostics:  I have reviewed all pertinent imaging results/findings within the past 24 hours.  CT: I have reviewed all pertinent results/findings within the past 24 hours and my personal findings are:  Critical left internal carotid artery stenosis.  70% right internal carotid artery stenosis    5 cm infrarenal abdominal aortic aneurysm    Assessment/Plan:     There are no hospital problems to display for this patient.    Asymptomatic critical left internal carotid artery stenosis  Will proceed with left carotid endarterectomy with EEG monitoring  Continue aspirin and statin  Obtain cardiac clearance from Dr. Simons  Will also need infrarenal abdominal aortic aneurysm repaired once carotid  endarterectomy has been completed          Thank you for your consult. I will follow-up with patient. Please contact us if you have any additional questions.    Bharat Fletcher IV, MD  Vascular Surgery  [facility] Vascular Surgery

## 2022-08-31 NOTE — H&P (VIEW-ONLY)
[facility] - Vascular Surgery  History & Physical  Vascular Surgery    SUBJECTIVE:     Chief Complaint/Reason for Admission:  Critical left carotid stenosis    History of Present Illness:  67-year-old male referred from cardiology for evaluation of an asymptomatic critical left internal carotid artery stenosis and an asymptomatic 5 cm infrarenal abdominal aortic aneurysm.  These were found on workup for his bilateral lower extremity claudication.  Denies previous heart attack or stroke.  Is currently on aspirin and statin therapy.    (Not in a hospital admission)      Review of patient's allergies indicates:  No Known Allergies    Past Medical History:   Diagnosis Date    Alcohol abuse     Carotid artery occlusion     COPD (chronic obstructive pulmonary disease)     Coronary artery disease     Depression     Diabetes mellitus     Hypertension     Hypokalemia     Hypomagnesemia     SHANELLE (obstructive sleep apnea)      Past Surgical History:   Procedure Laterality Date    HERNIA REPAIR      umbilical    LEFT HEART CATHETERIZATION Left 2022    Procedure: CATHETERIZATION, HEART, LEFT;  Surgeon: Jadiel Simons MD;  Location: [facility] CATH LAB;  Service: Cardiology;  Laterality: Left;     Family History   Problem Relation Age of Onset    Diabetes Mellitus Father      Social History     Tobacco Use    Smoking status: Former     Packs/day: 1.50     Years: 44.00     Pack years: 66.00     Types: Vaping with nicotine, Cigars, Cigarettes     Start date: 1968     Quit date: 2021     Years since quittin.7    Smokeless tobacco: Current   Substance Use Topics    Alcohol use: Yes     Comment: 3-4 beers daily and 1/2 pint of vodka daily    Drug use: No        Review of Systems:  Peripheral Vascular: Leg Claudication: bilateral: Location:  Catheterization daily, Frequency:  Daily  Constitutional: no fever or chills  Respiratory: no cough or shortness of breath  Cardiovascular: no chest pain or palpitations  Neurological:  no seizures or tremors    OBJECTIVE:     Vital Signs (Most Recent):  Temp: 96.4 °F (35.8 °C) (08/31/22 0929)  Pulse: (!) 55 (08/31/22 0929)  BP: 125/62 (08/31/22 0929)    Physical Exam:  General: no distress  Neck: supple, symmetrical, trachea midline  Heart: regular rate and rhythm, S1, S2 normal, no murmur, rub or gallop  Abdomen: soft, non-tender non-distented; bowel sounds normal; no masses,  no organomegaly  Extremities: no cyanosis or edema, or clubbing  Pulses: FEM: present 2+    Laboratory:  I have reviewed all pertinent lab results within the past 24 hours.    Diagnostic Results:  CT: Reviewed    ASSESSMENT/PLAN:     Asymptomatic critical left internal carotid artery stenosis    Will proceed with left carotid endarterectomy with EEG monitoring

## 2022-08-31 NOTE — H&P
The [facility] - Vascular Surgery  History & Physical  Vascular Surgery    SUBJECTIVE:     Chief Complaint/Reason for Admission:  Critical left carotid stenosis    History of Present Illness:  67-year-old male referred from cardiology for evaluation of an asymptomatic critical left internal carotid artery stenosis and an asymptomatic 5 cm infrarenal abdominal aortic aneurysm.  These were found on workup for his bilateral lower extremity claudication.  Denies previous heart attack or stroke.  Is currently on aspirin and statin therapy.    (Not in a hospital admission)      Review of patient's allergies indicates:  No Known Allergies    Past Medical History:   Diagnosis Date    Alcohol abuse     Carotid artery occlusion     COPD (chronic obstructive pulmonary disease)     Coronary artery disease     Depression     Diabetes mellitus     Hypertension     Hypokalemia     Hypomagnesemia     SHANELLE (obstructive sleep apnea)      Past Surgical History:   Procedure Laterality Date    HERNIA REPAIR      umbilical    LEFT HEART CATHETERIZATION Left 2022    Procedure: CATHETERIZATION, HEART, LEFT;  Surgeon: Jadiel Simons MD;  Location: [facility] CATH LAB;  Service: Cardiology;  Laterality: Left;     Family History   Problem Relation Age of Onset    Diabetes Mellitus Father      Social History     Tobacco Use    Smoking status: Former     Packs/day: 1.50     Years: 44.00     Pack years: 66.00     Types: Vaping with nicotine, Cigars, Cigarettes     Start date: 1968     Quit date: 2021     Years since quittin.7    Smokeless tobacco: Current   Substance Use Topics    Alcohol use: Yes     Comment: 3-4 beers daily and 1/2 pint of vodka daily    Drug use: No        Review of Systems:  Peripheral Vascular: Leg Claudication: bilateral: Location:  Catheterization daily, Frequency:  Daily  Constitutional: no fever or chills  Respiratory: no cough or shortness of breath  Cardiovascular: no chest pain or palpitations  Neurological:  no seizures or tremors    OBJECTIVE:     Vital Signs (Most Recent):  Temp: 96.4 °F (35.8 °C) (08/31/22 0929)  Pulse: (!) 55 (08/31/22 0929)  BP: 125/62 (08/31/22 0929)    Physical Exam:  General: no distress  Neck: supple, symmetrical, trachea midline  Heart: regular rate and rhythm, S1, S2 normal, no murmur, rub or gallop  Abdomen: soft, non-tender non-distented; bowel sounds normal; no masses,  no organomegaly  Extremities: no cyanosis or edema, or clubbing  Pulses: FEM: present 2+    Laboratory:  I have reviewed all pertinent lab results within the past 24 hours.    Diagnostic Results:  CT: Reviewed    ASSESSMENT/PLAN:     Asymptomatic critical left internal carotid artery stenosis    Will proceed with left carotid endarterectomy with EEG monitoring

## 2022-09-07 ENCOUNTER — TELEPHONE (OUTPATIENT)
Dept: VASCULAR SURGERY | Facility: CLINIC | Age: 67
End: 2022-09-07
Payer: MEDICARE

## 2022-09-07 NOTE — TELEPHONE ENCOUNTER
Returned call to Kiran, patient's son. He is calling to see if we have a surgery date yet. States that he was told by Dr. Fletcher that it would be in 1-2 week. Advised that I would reach out to the Surgery Scheduler. Sent e-mail to Surgery Scheduler.

## 2022-09-07 NOTE — TELEPHONE ENCOUNTER
----- Message from Marcela Noyola LPN sent at 9/7/2022  3:36 PM CDT -----  Here is the clearance  ----- Message -----  From: Jadiel Simons MD  Sent: 9/7/2022   3:25 PM CDT  To: Marcela Noyola LPN    OK TO PROCEED MODERATE RISK  ----- Message -----  From: Marcela Noyola LPN  Sent: 9/7/2022   2:43 PM CDT  To: Jadiel Simons MD    Please advise  ----- Message -----  From: Palmira Balderas MA  Sent: 9/7/2022   2:37 PM CDT  To: Kristyn [facility] Staff    Patient needs Cardiac clearance before Left Carotid Endarterectomy. Please advise.

## 2022-09-07 NOTE — TELEPHONE ENCOUNTER
----- Message from Emi Ruiz sent at 9/7/2022  2:00 PM CDT -----  Contact: Patient's son- Kiran  Please give patient's son a call back concerning patient's next appointment with Dr Fletcher at [phone] ( Kiran) patient's son.

## 2022-09-19 DIAGNOSIS — Z01.818 PRE-OP TESTING: Primary | ICD-10-CM

## 2022-09-19 NOTE — PRE ADMISSION SCREENING
Pre op instructions reviewed with patient's son Kiran Fong per phone on 9/19/22: Spoke about pre op process and surgery instructions, verbalized understanding.    Surgery is scheduled on 9/27/22.  We will call you the business day prior to surgery to confirm arrival time (after 2:30 pm), as it is subject to change due to cancellations & emergencies.    Please report to the [facility] (1st Floor) at [facility] located off of [facility] (2nd building on the left, in front of the [facility]),address: [address] [place] [address] [place] [id number]    Your Covid Test appointment is scheduled on [facility].      INSTRUCTIONS IMPORTANT!!!  Do Not Eat, Drink, or Smoke after 12 midnight! NO WATER after midnight! OK to brush teeth, no gum, candy or mints!    Dr. Fletcher's office to contact patient with Aspirin and Pletal instructions.    Take only these medicines with a small swallow of water-morning of surgery:  Albuterol Inhaler  Amlodipine  Celexa  Metoprolol    TAKE LAST DOSE OF METFORMIN SUNDAY 9/25/22.    ____  NO Acrylic/fake nails or nail polish worn day of surgery (specifically hand/arm & foot surgeries).  ____  NO powder, lotions, deodorants, oils or creams on body.  ____  Please Remove All jewelry & piercings prior to surgery.  ____  Please Remove Dentures, Hearing Aids & Contact Lens prior to the start of surgery.  ____  Please bring photo ID and insurance information to hospital (Leave Valuables at Home).  ____  If going home the same day, arrange for a ride home. You will not be able to drive 24 hrs if Anesthesia was used.   ____  Females (ages 11-60) may need to give a urine sample the morning of surgery; please see Pre op Nurse prior to voiding.  ____  Wear clean, loose fitting clothing. Allow for dressings, bandages.  ____  Stop all Aspirin products, Ibuprofen, Advil, Motrin & Aleve at least 5-7 days before surgery, unless otherwise instructed by your doctor, or the nurse.   ____  Blood Thinners are stopped  based on your Provider's recommendation; Call Surgeon's Office to inquire when to stop/hold.  ____  Stop taking any Fish Oil supplements or Vitamins at least 5 days prior to surgery, unless instructed otherwise by your Doctor.            Diabetic Patients: If you take diabetic medication, do NOT take morning of surgery unless instructed by             Doctor. Metformin to be stopped 24 hrs prior to surgery time. DO NOT take long-acting insulin the evening before surgery. Blood sugars will be checked in pre-op morning of.    Bathing Instructions:    -Do not shave your face or body the day before or the day of surgery.              -Do not shave abdominal area prior to surgery   -Shower & Rinse your body as usual with anti-bacterial Soap (Dial)              -Rinse your body thoroughly.     [facility] Visitor/Ride Policy:  Only 2 adults allowed (over the age of 18) to accompany you to the Hospital. You Must have a ride home from a responsible adult that you know and trust. Medical Transport, Uber or Lyft can only be used if patient has a responsible adult to accompany them during ride home.    Post-Op Instructions: You will receive Post-op/Discharge instructions by your Discharge Nurse prior to going home. Please call your Surgeon's office with any post-surgery questions/concerns.    *Call [facility] Pre-Admissions Department with surgery instruction questions @ [phone], [phone] or [phone] (Mon-Fri 8 am to 4 pm)    *If you are running late or have questions the morning of surgery, please call the Surgery Dept @ [phone]  *Insurance/ Financial Questions, please call [phone].

## 2022-09-21 ENCOUNTER — HOSPITAL ENCOUNTER (OUTPATIENT)
Dept: CARDIOLOGY | Facility: HOSPITAL | Age: 67
Discharge: HOME | End: 2022-09-21
Attending: SURGERY
Payer: MEDICARE

## 2022-09-21 DIAGNOSIS — Z01.818 PRE-OP TESTING: ICD-10-CM

## 2022-09-21 PROCEDURE — 93005 ELECTROCARDIOGRAM TRACING: CPT

## 2022-09-21 PROCEDURE — 93010 EKG 12-LEAD: ICD-10-PCS | Mod: ,,, | Performed by: INTERNAL MEDICINE

## 2022-09-21 PROCEDURE — 93010 ELECTROCARDIOGRAM REPORT: CPT | Mod: ,,, | Performed by: INTERNAL MEDICINE

## 2022-09-23 ENCOUNTER — NURSE TRIAGE (OUTPATIENT)
Dept: ADMINISTRATIVE | Facility: CLINIC | Age: 67
End: 2022-09-23
Payer: MEDICARE

## 2022-09-23 NOTE — TELEPHONE ENCOUNTER
Last A1c April 2022 with diabetes mellitus well controlled on metformin 1000 mg twice a day  If taking medicine as prescribed, needs to be seen to determine why the sugars are slightly so high   I ordered an A1c, can they come get it today  Schedule visit with me next week    If he has any significant symptoms orconfusion, he will need to go to the ER

## 2022-09-23 NOTE — TELEPHONE ENCOUNTER
Patient son calling on behalf of patient. Son states for the past 3 days the patient's BS has been running high. Patient states last night his BS at 1800 was 345. Patient took his medication and rechecked BS at 2230 which was 325. Patient reports BS this morning at 0800 of 347. Patient denies any symptoms at this time. Patient has not taken medication yet this morning. Advised patient to take medication and to recheck BS in 30 mins. Advised patient of dispo to contact PCP and receive callback by nurse within 1 hour. Verbalized understanding. Advised to call back if symptoms become worse or with further questions.        Reason for Disposition   Blood glucose > 300 mg/dL (16.7 mmol/L) AND two or more times in a row    Additional Information   Negative: Unconscious or difficult to awaken   Negative: Acting confused (e.g., disoriented, slurred speech)   Negative: Very weak (can't stand)   Negative: Sounds like a life-threatening emergency to the triager   Negative: Vomiting and signs of dehydration (e.g., very dry mouth, lightheaded, dark urine)   Negative: Blood glucose > 240 mg/dL (13.3 mmol/L) and rapid breathing   Negative: Blood glucose > 500 mg/dL (27.8 mmol/L)   Negative: Blood glucose > 240 mg/dL (13.3 mmol/L) AND urine ketones moderate-large (or more than 1+)   Negative: Blood glucose > 240 mg/dL (13.3 mmol/L) and blood ketones > 1.4 mmol/L   Negative: Blood glucose > 240 mg/dL (13.3 mmol/L) AND vomiting AND unable to check for ketones (in blood or urine)   Negative: Vomiting lasting > 4 hours   Negative: Patient sounds very sick or weak to the triager   Negative: Fever > 100.4 F (38.0 C)   Negative: Caller has URGENT medication or insulin pump question and triager unable to answer question   Negative: Blood glucose > 400 mg/dL (22.2 mmol/L)    Protocols used: Diabetes - High Blood Sugar-A-OH

## 2022-09-26 ENCOUNTER — TELEPHONE (OUTPATIENT)
Dept: PREADMISSION TESTING | Facility: HOSPITAL | Age: 67
End: 2022-09-26
Payer: MEDICARE

## 2022-09-26 ENCOUNTER — ANESTHESIA EVENT (OUTPATIENT)
Dept: SURGERY | Facility: HOSPITAL | Age: 67
DRG: 038 | End: 2022-09-26
Payer: MEDICARE

## 2022-09-26 ENCOUNTER — TELEPHONE (OUTPATIENT)
Dept: INTERNAL MEDICINE | Facility: CLINIC | Age: 67
End: 2022-09-26
Payer: MEDICARE

## 2022-09-26 NOTE — TELEPHONE ENCOUNTER
----- Message from Halle Kingston sent at 9/22/2022  1:05 PM CDT -----  Contact: Ulisses/ Son  Ulisses is needing a call back regarding the lab results from yesterday. Please call him back at [phone]

## 2022-09-26 NOTE — TELEPHONE ENCOUNTER
Attempt to contact pt son to schedule pt appt; no answer; lvm for callback /LD        ** Per Dr Michelle-NESS ordered an A1c, can they come get it today  Schedule visit with me next week     If he has any significant symptoms orconfusion, he will need to go to the ER**

## 2022-09-26 NOTE — TELEPHONE ENCOUNTER
Called and spoke with Pt's Daughter precious Franco about the following:     Please arrive to [facility] main lobby at 9:30am on 9/27/22 for your scheduled procedure. NOTHING to eat or drink after midnight unless instructed otherwise by your Surgeon. Take only the medications instructed by your Pre Admit Nurse with small sip of water.

## 2022-09-27 ENCOUNTER — ANESTHESIA (OUTPATIENT)
Dept: SURGERY | Facility: HOSPITAL | Age: 67
DRG: 038 | End: 2022-09-27
Payer: MEDICARE

## 2022-09-27 ENCOUNTER — HOSPITAL ENCOUNTER (INPATIENT)
Facility: HOSPITAL | Age: 67
LOS: 1 days | Discharge: HOME | DRG: 038 | End: 2022-09-28
Attending: SURGERY | Admitting: SURGERY
Payer: MEDICARE

## 2022-09-27 DIAGNOSIS — I65.22 LEFT CAROTID ARTERY STENOSIS: ICD-10-CM

## 2022-09-27 DIAGNOSIS — R00.1 BRADYCARDIA: ICD-10-CM

## 2022-09-27 DIAGNOSIS — I65.23 BILATERAL CAROTID ARTERY STENOSIS: Primary | ICD-10-CM

## 2022-09-27 PROBLEM — K82.8 GALLBLADDER MASS: Status: ACTIVE | Noted: 2022-09-27

## 2022-09-27 PROBLEM — N28.89 RENAL MASS, RIGHT: Status: ACTIVE | Noted: 2022-09-27

## 2022-09-27 LAB
ALBUMIN SERPL BCP-MCNC: 3.3 G/DL (ref 3.5–5.2)
ALP SERPL-CCNC: 86 U/L (ref 55–135)
ALT SERPL W/O P-5'-P-CCNC: 19 U/L (ref 10–44)
ANION GAP SERPL CALC-SCNC: 8 MMOL/L (ref 8–16)
AST SERPL-CCNC: 17 U/L (ref 10–40)
BASOPHILS # BLD AUTO: 0.06 K/UL (ref 0–0.2)
BASOPHILS NFR BLD: 0.5 % (ref 0–1.9)
BILIRUB SERPL-MCNC: 0.5 MG/DL (ref 0.1–1)
BUN SERPL-MCNC: 14 MG/DL (ref 8–23)
CALCIUM SERPL-MCNC: 8.2 MG/DL (ref 8.7–10.5)
CHLORIDE SERPL-SCNC: 101 MMOL/L (ref 95–110)
CO2 SERPL-SCNC: 25 MMOL/L (ref 23–29)
CREAT SERPL-MCNC: 1 MG/DL (ref 0.5–1.4)
DIFFERENTIAL METHOD: ABNORMAL
EOSINOPHIL # BLD AUTO: 0.1 K/UL (ref 0–0.5)
EOSINOPHIL NFR BLD: 0.6 % (ref 0–8)
ERYTHROCYTE [DISTWIDTH] IN BLOOD BY AUTOMATED COUNT: 12.8 % (ref 11.5–14.5)
EST. GFR  (NO RACE VARIABLE): >60 ML/MIN/1.73 M^2
GLUCOSE SERPL-MCNC: 215 MG/DL (ref 70–110)
HCT VFR BLD AUTO: 42.1 % (ref 40–54)
HGB BLD-MCNC: 13.9 G/DL (ref 14–18)
IMM GRANULOCYTES # BLD AUTO: 0.03 K/UL (ref 0–0.04)
IMM GRANULOCYTES NFR BLD AUTO: 0.3 % (ref 0–0.5)
LYMPHOCYTES # BLD AUTO: 2.2 K/UL (ref 1–4.8)
LYMPHOCYTES NFR BLD: 18.8 % (ref 18–48)
MCH RBC QN AUTO: 27.8 PG (ref 27–31)
MCHC RBC AUTO-ENTMCNC: 33 G/DL (ref 32–36)
MCV RBC AUTO: 84 FL (ref 82–98)
MONOCYTES # BLD AUTO: 0.8 K/UL (ref 0.3–1)
MONOCYTES NFR BLD: 6.5 % (ref 4–15)
NEUTROPHILS # BLD AUTO: 8.6 K/UL (ref 1.8–7.7)
NEUTROPHILS NFR BLD: 73.3 % (ref 38–73)
NRBC BLD-RTO: 0 /100 WBC
PLATELET # BLD AUTO: 192 K/UL (ref 150–450)
PMV BLD AUTO: 9.2 FL (ref 9.2–12.9)
POC ACTIVATED CLOTTING TIME K: 196 SEC (ref 74–137)
POC ACTIVATED CLOTTING TIME K: 260 SEC (ref 74–137)
POCT GLUCOSE: 173 MG/DL (ref 70–110)
POCT GLUCOSE: 191 MG/DL (ref 70–110)
POCT GLUCOSE: 213 MG/DL (ref 70–110)
POCT GLUCOSE: 261 MG/DL (ref 70–110)
POTASSIUM SERPL-SCNC: 5 MMOL/L (ref 3.5–5.1)
PROT SERPL-MCNC: 5.9 G/DL (ref 6–8.4)
RBC # BLD AUTO: 5 M/UL (ref 4.6–6.2)
SAMPLE: ABNORMAL
SAMPLE: ABNORMAL
SODIUM SERPL-SCNC: 134 MMOL/L (ref 136–145)
TROPONIN I SERPL DL<=0.01 NG/ML-MCNC: <0.006 NG/ML (ref 0–0.03)
WBC # BLD AUTO: 11.69 K/UL (ref 3.9–12.7)

## 2022-09-27 PROCEDURE — 71000033 HC RECOVERY, INTIAL HOUR: Performed by: SURGERY

## 2022-09-27 PROCEDURE — 25000003 PHARM REV CODE 250: Performed by: SURGERY

## 2022-09-27 PROCEDURE — 25000003 PHARM REV CODE 250: Performed by: ANESTHESIOLOGY

## 2022-09-27 PROCEDURE — 83036 HEMOGLOBIN GLYCOSYLATED A1C: CPT | Performed by: SURGERY

## 2022-09-27 PROCEDURE — 93005 ELECTROCARDIOGRAM TRACING: CPT

## 2022-09-27 PROCEDURE — 63600175 PHARM REV CODE 636 W HCPCS: Performed by: NURSE ANESTHETIST, CERTIFIED REGISTERED

## 2022-09-27 PROCEDURE — 37000009 HC ANESTHESIA EA ADD 15 MINS: Performed by: SURGERY

## 2022-09-27 PROCEDURE — 11000001 HC ACUTE MED/SURG PRIVATE ROOM

## 2022-09-27 PROCEDURE — 88304 TISSUE EXAM BY PATHOLOGIST: CPT | Performed by: PATHOLOGY

## 2022-09-27 PROCEDURE — 25000003 PHARM REV CODE 250: Performed by: NURSE ANESTHETIST, CERTIFIED REGISTERED

## 2022-09-27 PROCEDURE — 63600175 PHARM REV CODE 636 W HCPCS: Performed by: SURGERY

## 2022-09-27 PROCEDURE — 36000706: Performed by: SURGERY

## 2022-09-27 PROCEDURE — 93010 ELECTROCARDIOGRAM REPORT: CPT | Mod: ,,, | Performed by: INTERNAL MEDICINE

## 2022-09-27 PROCEDURE — 88311 PR  DECALCIFY TISSUE: ICD-10-PCS | Mod: 26,,, | Performed by: PATHOLOGY

## 2022-09-27 PROCEDURE — 71000039 HC RECOVERY, EACH ADD'L HOUR: Performed by: SURGERY

## 2022-09-27 PROCEDURE — 94799 UNLISTED PULMONARY SVC/PX: CPT

## 2022-09-27 PROCEDURE — 99900035 HC TECH TIME PER 15 MIN (STAT)

## 2022-09-27 PROCEDURE — 88271 CYTOGENETICS DNA PROBE: CPT | Performed by: PATHOLOGY

## 2022-09-27 PROCEDURE — 27201423 OPTIME MED/SURG SUP & DEVICES STERILE SUPPLY: Performed by: SURGERY

## 2022-09-27 PROCEDURE — 37000008 HC ANESTHESIA 1ST 15 MINUTES: Performed by: SURGERY

## 2022-09-27 PROCEDURE — 63600175 PHARM REV CODE 636 W HCPCS: Performed by: ANESTHESIOLOGY

## 2022-09-27 PROCEDURE — 88304 TISSUE EXAM BY PATHOLOGIST: CPT | Mod: 26,,, | Performed by: PATHOLOGY

## 2022-09-27 PROCEDURE — 36415 COLL VENOUS BLD VENIPUNCTURE: CPT | Performed by: NURSE PRACTITIONER

## 2022-09-27 PROCEDURE — 80053 COMPREHEN METABOLIC PANEL: CPT | Performed by: NURSE PRACTITIONER

## 2022-09-27 PROCEDURE — 93010 EKG 12-LEAD: ICD-10-PCS | Mod: ,,, | Performed by: INTERNAL MEDICINE

## 2022-09-27 PROCEDURE — 36000707: Performed by: SURGERY

## 2022-09-27 PROCEDURE — 84484 ASSAY OF TROPONIN QUANT: CPT | Performed by: NURSE PRACTITIONER

## 2022-09-27 PROCEDURE — 85025 COMPLETE CBC W/AUTO DIFF WBC: CPT | Performed by: NURSE PRACTITIONER

## 2022-09-27 PROCEDURE — 36415 COLL VENOUS BLD VENIPUNCTURE: CPT | Performed by: SURGERY

## 2022-09-27 PROCEDURE — 88304 PR  SURG PATH,LEVEL III: ICD-10-PCS | Mod: 26,,, | Performed by: PATHOLOGY

## 2022-09-27 PROCEDURE — 88311 DECALCIFY TISSUE: CPT | Mod: 26,,, | Performed by: PATHOLOGY

## 2022-09-27 PROCEDURE — C1768 GRAFT, VASCULAR: HCPCS | Performed by: SURGERY

## 2022-09-27 DEVICE — VASCU-GUARD PERIPHERAL VASCULAR PATCH IS PREPARED FROM BOVINE PERICARDIUM WHICH IS CROSS-LINKED WITH GLUTARALDEHYDE. THE PERICARDIUM IS PROCURED FROM CATTLE ORIGINATING IN THE UNITED STATES. VASCU-GUARD PERIPHERAL VASCULAR PATCH IS CHEMICALLY STERILIZED USING ETHANOL AND PROPYLENE OXIDE. VASCU-GUARD PERIPHERAL VASCULAR PATCH HAS BEEN TREATED WITH 1 MOLAR SODIUM HYDROXIDE FOR A MINIMUM OF 60 MINUTES AT 20 - 25 C.  VASCU-GUARD PERIPHERAL VASCULAR PATCH IS PACKAGED IN A CONTAINER FILLED WITH STERILE, NON-PYROGENIC WATER CONTAINING PROPYLENE OXIDE. THE CONTENTS OF THE UNOPENED, UNDAMAGED CONTAINER ARE STERILE.
Type: IMPLANTABLE DEVICE | Site: CAROTID | Status: FUNCTIONAL
Brand: VASCU-GUARD PERIPHERAL VASCULAR PATCH

## 2022-09-27 RX ORDER — KETOROLAC TROMETHAMINE 30 MG/ML
15 INJECTION, SOLUTION INTRAMUSCULAR; INTRAVENOUS EVERY 8 HOURS PRN
Status: DISCONTINUED | OUTPATIENT
Start: 2022-09-27 | End: 2022-09-27 | Stop reason: HOSPADM

## 2022-09-27 RX ORDER — HYDROCODONE BITARTRATE AND ACETAMINOPHEN 5; 325 MG/1; MG/1
1 TABLET ORAL EVERY 4 HOURS PRN
Status: DISCONTINUED | OUTPATIENT
Start: 2022-09-27 | End: 2022-09-28 | Stop reason: HOSPADM

## 2022-09-27 RX ORDER — INSULIN ASPART 100 [IU]/ML
0-5 INJECTION, SOLUTION INTRAVENOUS; SUBCUTANEOUS
Status: DISCONTINUED | OUTPATIENT
Start: 2022-09-27 | End: 2022-09-28 | Stop reason: HOSPADM

## 2022-09-27 RX ORDER — LIDOCAINE HYDROCHLORIDE 10 MG/ML
1 INJECTION, SOLUTION EPIDURAL; INFILTRATION; INTRACAUDAL; PERINEURAL ONCE
Status: DISCONTINUED | OUTPATIENT
Start: 2022-09-27 | End: 2022-09-27

## 2022-09-27 RX ORDER — FENTANYL CITRATE 50 UG/ML
INJECTION, SOLUTION INTRAMUSCULAR; INTRAVENOUS
Status: DISCONTINUED | OUTPATIENT
Start: 2022-09-27 | End: 2022-09-27

## 2022-09-27 RX ORDER — PHENYLEPHRINE HYDROCHLORIDE 10 MG/ML
INJECTION INTRAVENOUS
Status: DISCONTINUED | OUTPATIENT
Start: 2022-09-27 | End: 2022-09-27

## 2022-09-27 RX ORDER — CEFAZOLIN SODIUM 1 G/3ML
INJECTION, POWDER, FOR SOLUTION INTRAMUSCULAR; INTRAVENOUS
Status: DISCONTINUED | OUTPATIENT
Start: 2022-09-27 | End: 2022-09-27

## 2022-09-27 RX ORDER — OXYCODONE HYDROCHLORIDE 5 MG/1
10 TABLET ORAL EVERY 4 HOURS PRN
Status: DISCONTINUED | OUTPATIENT
Start: 2022-09-27 | End: 2022-09-28 | Stop reason: HOSPADM

## 2022-09-27 RX ORDER — LOSARTAN POTASSIUM 50 MG/1
100 TABLET ORAL DAILY
Status: DISCONTINUED | OUTPATIENT
Start: 2022-09-27 | End: 2022-09-28 | Stop reason: HOSPADM

## 2022-09-27 RX ORDER — ENOXAPARIN SODIUM 100 MG/ML
40 INJECTION SUBCUTANEOUS EVERY 24 HOURS
Status: DISCONTINUED | OUTPATIENT
Start: 2022-09-27 | End: 2022-09-28 | Stop reason: HOSPADM

## 2022-09-27 RX ORDER — DIPHENHYDRAMINE HYDROCHLORIDE 50 MG/ML
25 INJECTION INTRAMUSCULAR; INTRAVENOUS EVERY 6 HOURS PRN
Status: DISCONTINUED | OUTPATIENT
Start: 2022-09-27 | End: 2022-09-27 | Stop reason: HOSPADM

## 2022-09-27 RX ORDER — OXYCODONE HYDROCHLORIDE 5 MG/1
5 TABLET ORAL
Status: DISCONTINUED | OUTPATIENT
Start: 2022-09-27 | End: 2022-09-27 | Stop reason: HOSPADM

## 2022-09-27 RX ORDER — IBUPROFEN 200 MG
16 TABLET ORAL
Status: DISCONTINUED | OUTPATIENT
Start: 2022-09-27 | End: 2022-09-28 | Stop reason: HOSPADM

## 2022-09-27 RX ORDER — SODIUM CHLORIDE 9 MG/ML
INJECTION, SOLUTION INTRAVENOUS CONTINUOUS
Status: DISCONTINUED | OUTPATIENT
Start: 2022-09-27 | End: 2022-09-28 | Stop reason: HOSPADM

## 2022-09-27 RX ORDER — ASPIRIN 81 MG/1
81 TABLET ORAL DAILY
Status: DISCONTINUED | OUTPATIENT
Start: 2022-09-27 | End: 2022-09-27 | Stop reason: SDUPTHER

## 2022-09-27 RX ORDER — LIDOCAINE HYDROCHLORIDE 10 MG/ML
INJECTION, SOLUTION EPIDURAL; INFILTRATION; INTRACAUDAL; PERINEURAL
Status: DISCONTINUED | OUTPATIENT
Start: 2022-09-27 | End: 2022-09-27

## 2022-09-27 RX ORDER — NAPROXEN SODIUM 220 MG/1
81 TABLET, FILM COATED ORAL DAILY
Status: DISCONTINUED | OUTPATIENT
Start: 2022-09-28 | End: 2022-09-28 | Stop reason: HOSPADM

## 2022-09-27 RX ORDER — METOPROLOL SUCCINATE 25 MG/1
25 TABLET, EXTENDED RELEASE ORAL DAILY
Status: DISCONTINUED | OUTPATIENT
Start: 2022-09-28 | End: 2022-09-28 | Stop reason: HOSPADM

## 2022-09-27 RX ORDER — HEPARIN SODIUM 1000 [USP'U]/ML
INJECTION, SOLUTION INTRAVENOUS; SUBCUTANEOUS
Status: DISCONTINUED | OUTPATIENT
Start: 2022-09-27 | End: 2022-09-27 | Stop reason: HOSPADM

## 2022-09-27 RX ORDER — ONDANSETRON 2 MG/ML
4 INJECTION INTRAMUSCULAR; INTRAVENOUS EVERY 12 HOURS PRN
Status: DISCONTINUED | OUTPATIENT
Start: 2022-09-27 | End: 2022-09-28 | Stop reason: HOSPADM

## 2022-09-27 RX ORDER — SODIUM CHLORIDE 0.9 % (FLUSH) 0.9 %
10 SYRINGE (ML) INJECTION
Status: DISCONTINUED | OUTPATIENT
Start: 2022-09-27 | End: 2022-09-28 | Stop reason: HOSPADM

## 2022-09-27 RX ORDER — GABAPENTIN 300 MG/1
900 CAPSULE ORAL NIGHTLY
Status: DISCONTINUED | OUTPATIENT
Start: 2022-09-27 | End: 2022-09-28 | Stop reason: HOSPADM

## 2022-09-27 RX ORDER — ATORVASTATIN CALCIUM 40 MG/1
40 TABLET, FILM COATED ORAL DAILY
Status: DISCONTINUED | OUTPATIENT
Start: 2022-09-27 | End: 2022-09-27 | Stop reason: SDUPTHER

## 2022-09-27 RX ORDER — CILOSTAZOL 100 MG/1
100 TABLET ORAL 2 TIMES DAILY
Status: DISCONTINUED | OUTPATIENT
Start: 2022-09-27 | End: 2022-09-28 | Stop reason: HOSPADM

## 2022-09-27 RX ORDER — ALBUTEROL SULFATE 0.83 MG/ML
2.5 SOLUTION RESPIRATORY (INHALATION) EVERY 4 HOURS PRN
Status: DISCONTINUED | OUTPATIENT
Start: 2022-09-27 | End: 2022-09-28 | Stop reason: HOSPADM

## 2022-09-27 RX ORDER — CEFAZOLIN SODIUM 2 G/50ML
2 SOLUTION INTRAVENOUS
Status: DISCONTINUED | OUTPATIENT
Start: 2022-09-27 | End: 2022-09-28 | Stop reason: HOSPADM

## 2022-09-27 RX ORDER — GLUCAGON 1 MG
1 KIT INJECTION
Status: DISCONTINUED | OUTPATIENT
Start: 2022-09-27 | End: 2022-09-28 | Stop reason: HOSPADM

## 2022-09-27 RX ORDER — HEPARIN SODIUM 1000 [USP'U]/ML
INJECTION, SOLUTION INTRAVENOUS; SUBCUTANEOUS
Status: DISCONTINUED | OUTPATIENT
Start: 2022-09-27 | End: 2022-09-27

## 2022-09-27 RX ORDER — IBUPROFEN 200 MG
24 TABLET ORAL
Status: DISCONTINUED | OUTPATIENT
Start: 2022-09-27 | End: 2022-09-28 | Stop reason: HOSPADM

## 2022-09-27 RX ORDER — PROPOFOL 10 MG/ML
VIAL (ML) INTRAVENOUS
Status: DISCONTINUED | OUTPATIENT
Start: 2022-09-27 | End: 2022-09-27

## 2022-09-27 RX ORDER — MEPERIDINE HYDROCHLORIDE 25 MG/ML
12.5 INJECTION INTRAMUSCULAR; INTRAVENOUS; SUBCUTANEOUS ONCE
Status: DISCONTINUED | OUTPATIENT
Start: 2022-09-27 | End: 2022-09-27 | Stop reason: HOSPADM

## 2022-09-27 RX ORDER — CHLORHEXIDINE GLUCONATE ORAL RINSE 1.2 MG/ML
10 SOLUTION DENTAL 2 TIMES DAILY
Status: DISCONTINUED | OUTPATIENT
Start: 2022-09-27 | End: 2022-09-28 | Stop reason: HOSPADM

## 2022-09-27 RX ORDER — AMLODIPINE BESYLATE 10 MG/1
10 TABLET ORAL DAILY
Status: DISCONTINUED | OUTPATIENT
Start: 2022-09-28 | End: 2022-09-28 | Stop reason: HOSPADM

## 2022-09-27 RX ORDER — METFORMIN HYDROCHLORIDE 500 MG/1
1000 TABLET ORAL 2 TIMES DAILY WITH MEALS
Status: DISCONTINUED | OUTPATIENT
Start: 2022-09-27 | End: 2022-09-27 | Stop reason: ALTCHOICE

## 2022-09-27 RX ORDER — ROCURONIUM BROMIDE 10 MG/ML
INJECTION, SOLUTION INTRAVENOUS
Status: DISCONTINUED | OUTPATIENT
Start: 2022-09-27 | End: 2022-09-27

## 2022-09-27 RX ORDER — ASPIRIN 325 MG/1
100 TABLET, FILM COATED ORAL DAILY
Status: DISCONTINUED | OUTPATIENT
Start: 2022-09-27 | End: 2022-09-28 | Stop reason: HOSPADM

## 2022-09-27 RX ORDER — ATORVASTATIN CALCIUM 40 MG/1
40 TABLET, FILM COATED ORAL DAILY
Refills: 3 | Status: DISCONTINUED | OUTPATIENT
Start: 2022-09-27 | End: 2022-09-28 | Stop reason: HOSPADM

## 2022-09-27 RX ORDER — PROCHLORPERAZINE EDISYLATE 5 MG/ML
5 INJECTION INTRAMUSCULAR; INTRAVENOUS EVERY 30 MIN PRN
Status: DISCONTINUED | OUTPATIENT
Start: 2022-09-27 | End: 2022-09-27 | Stop reason: HOSPADM

## 2022-09-27 RX ORDER — ACETAMINOPHEN 325 MG/1
650 TABLET ORAL EVERY 6 HOURS PRN
Status: DISCONTINUED | OUTPATIENT
Start: 2022-09-27 | End: 2022-09-28 | Stop reason: HOSPADM

## 2022-09-27 RX ORDER — ALBUTEROL SULFATE 90 UG/1
2 AEROSOL, METERED RESPIRATORY (INHALATION) EVERY 4 HOURS PRN
Status: DISCONTINUED | OUTPATIENT
Start: 2022-09-27 | End: 2022-09-27 | Stop reason: CLARIF

## 2022-09-27 RX ORDER — MUPIROCIN 20 MG/G
OINTMENT TOPICAL
Status: DISCONTINUED | OUTPATIENT
Start: 2022-09-27 | End: 2022-09-28 | Stop reason: HOSPADM

## 2022-09-27 RX ORDER — ONDANSETRON 2 MG/ML
4 INJECTION INTRAMUSCULAR; INTRAVENOUS DAILY PRN
Status: DISCONTINUED | OUTPATIENT
Start: 2022-09-27 | End: 2022-09-27 | Stop reason: HOSPADM

## 2022-09-27 RX ORDER — SODIUM CHLORIDE 0.9 % (FLUSH) 0.9 %
3 SYRINGE (ML) INJECTION
Status: DISCONTINUED | OUTPATIENT
Start: 2022-09-27 | End: 2022-09-28 | Stop reason: HOSPADM

## 2022-09-27 RX ORDER — PROTAMINE SULFATE 10 MG/ML
INJECTION, SOLUTION INTRAVENOUS
Status: DISCONTINUED | OUTPATIENT
Start: 2022-09-27 | End: 2022-09-27

## 2022-09-27 RX ORDER — LANOLIN ALCOHOL/MO/W.PET/CERES
1000 CREAM (GRAM) TOPICAL
Status: DISCONTINUED | OUTPATIENT
Start: 2022-09-28 | End: 2022-09-28 | Stop reason: HOSPADM

## 2022-09-27 RX ORDER — CITALOPRAM 10 MG/1
20 TABLET ORAL DAILY
Status: DISCONTINUED | OUTPATIENT
Start: 2022-09-28 | End: 2022-09-28 | Stop reason: HOSPADM

## 2022-09-27 RX ORDER — FOLIC ACID 1 MG/1
1 TABLET ORAL DAILY
Status: DISCONTINUED | OUTPATIENT
Start: 2022-09-27 | End: 2022-09-28 | Stop reason: HOSPADM

## 2022-09-27 RX ORDER — ONDANSETRON 2 MG/ML
INJECTION INTRAMUSCULAR; INTRAVENOUS
Status: DISCONTINUED | OUTPATIENT
Start: 2022-09-27 | End: 2022-09-27

## 2022-09-27 RX ORDER — HYDROMORPHONE HYDROCHLORIDE 2 MG/ML
0.2 INJECTION, SOLUTION INTRAMUSCULAR; INTRAVENOUS; SUBCUTANEOUS EVERY 5 MIN PRN
Status: DISCONTINUED | OUTPATIENT
Start: 2022-09-27 | End: 2022-09-27 | Stop reason: HOSPADM

## 2022-09-27 RX ORDER — CEFAZOLIN SODIUM 1 G/3ML
INJECTION, POWDER, FOR SOLUTION INTRAMUSCULAR; INTRAVENOUS
Status: DISCONTINUED | OUTPATIENT
Start: 2022-09-27 | End: 2022-09-27 | Stop reason: HOSPADM

## 2022-09-27 RX ADMIN — CHLORHEXIDINE GLUCONATE 0.12% ORAL RINSE 10 ML: 1.2 LIQUID ORAL at 08:09

## 2022-09-27 RX ADMIN — SODIUM CHLORIDE, SODIUM LACTATE, POTASSIUM CHLORIDE, AND CALCIUM CHLORIDE: .6; .31; .03; .02 INJECTION, SOLUTION INTRAVENOUS at 11:09

## 2022-09-27 RX ADMIN — PHENYLEPHRINE HYDROCHLORIDE 100 MCG: 10 INJECTION INTRAVENOUS at 12:09

## 2022-09-27 RX ADMIN — KETOROLAC TROMETHAMINE 15 MG: 30 INJECTION, SOLUTION INTRAMUSCULAR; INTRAVENOUS at 03:09

## 2022-09-27 RX ADMIN — PHENYLEPHRINE HYDROCHLORIDE 100 MCG: 10 INJECTION INTRAVENOUS at 01:09

## 2022-09-27 RX ADMIN — PHENYLEPHRINE HYDROCHLORIDE 50 MCG: 10 INJECTION INTRAVENOUS at 01:09

## 2022-09-27 RX ADMIN — SODIUM CHLORIDE: 0.9 INJECTION, SOLUTION INTRAVENOUS at 08:09

## 2022-09-27 RX ADMIN — ROCURONIUM BROMIDE 30 MG: 10 INJECTION, SOLUTION INTRAVENOUS at 01:09

## 2022-09-27 RX ADMIN — INSULIN HUMAN 5 UNITS: 100 INJECTION, SOLUTION PARENTERAL at 10:09

## 2022-09-27 RX ADMIN — INSULIN ASPART 1 UNITS: 100 INJECTION, SOLUTION INTRAVENOUS; SUBCUTANEOUS at 08:09

## 2022-09-27 RX ADMIN — GABAPENTIN 900 MG: 300 CAPSULE ORAL at 08:09

## 2022-09-27 RX ADMIN — ROCURONIUM BROMIDE 100 MG: 10 INJECTION, SOLUTION INTRAVENOUS at 11:09

## 2022-09-27 RX ADMIN — LIDOCAINE HYDROCHLORIDE 100 MG: 10 INJECTION, SOLUTION EPIDURAL; INFILTRATION; INTRACAUDAL; PERINEURAL at 11:09

## 2022-09-27 RX ADMIN — HEPARIN SODIUM 11000 UNITS: 1000 INJECTION, SOLUTION INTRAVENOUS; SUBCUTANEOUS at 01:09

## 2022-09-27 RX ADMIN — FENTANYL CITRATE 50 MCG: 50 INJECTION, SOLUTION INTRAMUSCULAR; INTRAVENOUS at 01:09

## 2022-09-27 RX ADMIN — SODIUM CHLORIDE: 9 INJECTION, SOLUTION INTRAVENOUS at 11:09

## 2022-09-27 RX ADMIN — PROPOFOL 100 MG: 10 INJECTION, EMULSION INTRAVENOUS at 11:09

## 2022-09-27 RX ADMIN — OXYCODONE HYDROCHLORIDE 5 MG: 5 TABLET ORAL at 02:09

## 2022-09-27 RX ADMIN — SODIUM CHLORIDE, SODIUM LACTATE, POTASSIUM CHLORIDE, AND CALCIUM CHLORIDE: .6; .31; .03; .02 INJECTION, SOLUTION INTRAVENOUS at 12:09

## 2022-09-27 RX ADMIN — ONDANSETRON 4 MG: 2 INJECTION, SOLUTION INTRAMUSCULAR; INTRAVENOUS at 02:09

## 2022-09-27 RX ADMIN — PHENYLEPHRINE HYDROCHLORIDE 50 MCG: 10 INJECTION INTRAVENOUS at 02:09

## 2022-09-27 RX ADMIN — CEFAZOLIN 2 G: 1 INJECTION, POWDER, FOR SOLUTION INTRAMUSCULAR; INTRAVENOUS at 12:09

## 2022-09-27 RX ADMIN — CILOSTAZOL 100 MG: 100 TABLET ORAL at 08:09

## 2022-09-27 RX ADMIN — SODIUM CHLORIDE: 9 INJECTION, SOLUTION INTRAVENOUS at 02:09

## 2022-09-27 RX ADMIN — FENTANYL CITRATE 50 MCG: 50 INJECTION, SOLUTION INTRAMUSCULAR; INTRAVENOUS at 11:09

## 2022-09-27 RX ADMIN — ROCURONIUM BROMIDE 20 MG: 10 INJECTION, SOLUTION INTRAVENOUS at 01:09

## 2022-09-27 RX ADMIN — PROTAMINE SULFATE 40 MG: 10 INJECTION, SOLUTION INTRAVENOUS at 02:09

## 2022-09-27 NOTE — ANESTHESIA POSTPROCEDURE EVALUATION
Anesthesia Post Evaluation    Patient: Lamine Fong    Procedure(s) Performed: Procedure(s) (LRB):  ENDARTERECTOMY-CAROTID (Left)    Final Anesthesia Type: general      Patient location during evaluation: PACU  Patient participation: Yes- Able to Participate  Level of consciousness: awake  Post-procedure vital signs: reviewed and stable  Pain management: adequate  Airway patency: patent    PONV status at discharge: No PONV  Anesthetic complications: no      Cardiovascular status: hemodynamically stable  Respiratory status: unassisted  Hydration status: euvolemic  Follow-up not needed.          Vitals Value Taken Time   /56 09/27/22 1619   Temp  09/27/22 1632   Pulse 42 09/27/22 1632   Resp 12 09/27/22 1632   SpO2 96 % 09/27/22 1632   Vitals shown include unvalidated device data.      No case tracking events are documented in the log.      Pain/Janeth Score: Pain Rating Prior to Med Admin: 5 (9/27/2022  3:21 PM)  Janeth Score: 9 (9/27/2022  4:00 PM)

## 2022-09-27 NOTE — TRANSFER OF CARE
"Anesthesia Transfer of Care Note    Patient: Lamine Fong    Procedure(s) Performed: Procedure(s) (LRB):  ENDARTERECTOMY-CAROTID (Left)    Patient location: PACU    Anesthesia Type: general    Transport from OR: Transported from OR on room air with adequate spontaneous ventilation    Post pain: adequate analgesia    Post assessment: no apparent anesthetic complications    Post vital signs: stable    Level of consciousness: sedated    Nausea/Vomiting: no nausea/vomiting    Complications: none    Transfer of care protocol was followed      Last vitals:   Visit Vitals  BP (!) 151/69   Pulse (!) 53   Temp 36.4 °C (97.5 °F) (Temporal)   Resp 18   Ht 6' 4" (1.93 m)   Wt 111.8 kg (246 lb 9.4 oz)   SpO2 96%   BMI 30.02 kg/m²     "

## 2022-09-27 NOTE — ANESTHESIA PREPROCEDURE EVALUATION
09/27/2022  Lamine Fong is a 67 y.o., male.    Patient Active Problem List   Diagnosis    Abdominal aortic aneurysm (AAA) without rupture    Aneurysm of left iliac artery    Cholelithiasis without cholecystitis    Primary hypertension    Type 2 diabetes mellitus with foot ulcer, without long-term current use of insulin    Bradycardia    History of alcohol abuse    Emphysema of lung    Neuropathy    Orthostatic syncope    Hyperlipidemia associated with type 2 diabetes mellitus    Bilateral carotid artery stenosis    Chronic diastolic (congestive) heart failure    Tobacco abuse    Onychomycosis of multiple toenails with type 2 diabetes mellitus and peripheral neuropathy    Sun-damaged skin    Abnormal nuclear stress test    PAD (peripheral artery disease)    Coronary artery disease of native artery of native heart with stable angina pectoris    Depression    Mixed hyperlipidemia     Pre-op Assessment    I have reviewed the Patient Summary Reports.    I have reviewed the NPO Status.   I have reviewed the Medications.     Review of Systems  Anesthesia Hx:  No problems with previous Anesthesia  Denies Family Hx of Anesthesia complications.   Denies Personal Hx of Anesthesia complications.   Social:  Alcohol Use, Smoker    Hematology/Oncology:  Hematology Normal   Oncology Normal     EENT/Dental:EENT/Dental Normal   Cardiovascular:   Hypertension CAD   CHF PVD ECG has been reviewed. 80% LICA stenosis  70% ALLY stenosis  5 cm AAA with iliac ds  100% distal Cx Single vessel CAD  HFpEF   Pulmonary:   COPD Sleep Apnea    Renal/:  Renal/ Normal     Hepatic/GI:  Hepatic/GI Normal    Musculoskeletal:  Musculoskeletal Normal    Neurological:  Neurology Normal    Endocrine:   Diabetes    Dermatological:  Skin Normal    Psych:   depression          Physical Exam  General: Alert and  Oriented    Airway:  Mallampati: II   Mouth Opening: Normal  TM Distance: Normal  Tongue: Normal  Neck ROM: Normal ROM    Dental:  Missing right upper mesial incisor  Very loose right upper lateral incisor.  Numerous other missing and severely carious teeth  Informed the patient that the lateral incisor is very likely to be knocked out  Patient understands and accepts      Anesthesia Plan  Type of Anesthesia, risks & benefits discussed:    Anesthesia Type: Gen ETT  Intra-op Monitoring Plan: Standard ASA Monitors and Art Line  Induction:  IV  Informed Consent: Informed consent signed with the Patient and all parties understand the risks and agree with anesthesia plan.  All questions answered.   ASA Score: 3  Day of Surgery Review of History & Physical: I have interviewed and examined the patient. I have reviewed the patient's H&P dated:     Ready For Surgery From Anesthesia Perspective.     .

## 2022-09-27 NOTE — OP NOTE
[facility] - Surgery ([facility])  Vascular Surgery  Operative Note    SUMMARY     Date of Procedure: 9/27/2022     Procedure: Procedure(s) (LRB):  ENDARTERECTOMY-CAROTID (Left)     Surgeon(s) and Role:     * Bharat Fletcher IV, MD - Primary    Assisting Surgeon: None    Pre-Operative Diagnosis: Carotid stenosis, asymptomatic, left [I65.22]    Post-Operative Diagnosis: Post-Op Diagnosis Codes:     * Carotid stenosis, asymptomatic, left [I65.22]    Anesthesia: General    Operative Findings (including complications, if any): severe hemorrhagic left carotid plaque    Description of Technical Procedures: After informed consent was obtained patient brought to the operating applied stable supine position.  The left neck and shoulder were prepped draped usual standard sterile fashion.  General trach anesthesia was induced.  EEG monitoring was began.  After time-out was performed a 5 cm longitudinal incision was made overlying the medial border of the sternocleidomastoid muscle.  The platysma was divided.  The sternocleidomastoid muscle was then retracted laterally.  The internal jugular vein was identified and the facial vein was ligated and divided.  Retracting the internal jugular vein laterally expose the common carotid artery which was sharply dissected and controlled with silastic vessel loop.  The external and distal internal carotid artery were then dissected and encircled with silastic vessel loops.  The patient was systemically heparinized.  After 3 minutes a Valente clamp was placed on the distal left internal carotid artery which was free of disease.  There are no changes on EEG monitoring.  Maps were kept greater than 100. The common carotid artery was clamped and the external carotid and superior thyroid artery or clamped with vessel loop.  A 11 blade used to make a small arteriotomy in the distal left common carotid artery which was good was extended with Guerrero scissors cross the high-grade stenosis in the left  internal carotid artery.  [place]ittier elevator was used to remove the left internal carotid artery plaque.  Care was taken to feather the distal endpoint of the carotid plaque from the internal carotid artery.  The end point was then tacked down with 7-0 prolene.  All free debris was removed from the endarterectomized carotid artery.  At this point a bovine pericardial patch angioplasty was performed using a single continuous 6 0 Prolene suture.  Prior to completing the patch the internal carotid, external carotid, and common carotid artery were flushed.  The patch angioplasty was completed.  The external carotid artery and common carotid artery were opened and allowed to flush for approximately 10-15 heartbeats finally the internal carotid artery was unclamped.  There were no changes on EEG monitoring.  Doppler was used to assess the flow in the distal left internal carotid artery which was normal.  At this 40 mg of protamine was given.  Hemostasis was achieved.  The incision was irrigated with antibiotic solution and then closed in multiple layers using 2-0 Vicryl suture to close the platysma and 4-0 Monocryl Dermabond for the skin.  Patient awoke neurologically intact moving all extremities.    Significant Surgical Tasks Conducted by the Assistant(s), if Applicable: none    Estimated Blood Loss (EBL): * No values recorded between 9/27/2022 12:29 PM and 9/27/2022  2:27 PM *           Implants:   Implant Name Type Inv. Item Serial No.  Lot No. LRB No. Used Action   PATCH BOVINE 1 X 6 - [id number]  PATCH BOVINE 1 X 6  [email or web address] [id number] 1 Implanted       Specimens:   Specimen (24h ago, onward)       Start     Ordered    09/27/22 1416  Specimen to Pathology, Surgery Other (Vascular)  Once        Comments: Pre-op Diagnosis: Carotid stenosis, asymptomatic, left [I65.22]Procedure(s):ENDARTERECTOMY-CAROTID Number of specimens: 1Name of specimens: 1. Left Carotid Plaque (PERM)      References:    Click here for ordering Quick Tip   Question Answer Comment   Procedure Type: Other Vascular   Which provider would you like to cc? PJ CARR IV    Release to patient Immediate        09/27/22 2553                            Condition: Good    Disposition: PACU - hemodynamically stable.    Attestation: I was present and scrubbed for the entire procedure.

## 2022-09-27 NOTE — ANESTHESIA PROCEDURE NOTES
Intubation    Date/Time: 9/27/2022 11:53 AM  Performed by: Dale Stoddard CRNA  Authorized by: Param Paez MD     Intubation:     Induction:  Intravenous    Intubated:  Postinduction    Mask Ventilation:  Moderately difficult with oral airway    Attempts:  1    Attempted By:  Student    Method of Intubation:  Direct    Laryngeal View Grade: Grade I - full view of cords      Difficult Airway Encountered?: No      Complications:  None    Airway Device:  Oral endotracheal tube    Airway Device Size:  7.5    Style/Cuff Inflation:  Cuffed (inflated to minimal occlusive pressure)    Tube secured:  24    Secured at:  The lips    Placement Verified By:  Capnometry    Complicating Factors:  None    Findings Post-Intubation:  BS equal bilateral and atraumatic/condition of teeth unchanged

## 2022-09-28 VITALS
BODY MASS INDEX: 31.44 KG/M2 | RESPIRATION RATE: 18 BRPM | WEIGHT: 258.19 LBS | HEIGHT: 76 IN | HEART RATE: 58 BPM | TEMPERATURE: 98 F | DIASTOLIC BLOOD PRESSURE: 65 MMHG | SYSTOLIC BLOOD PRESSURE: 144 MMHG | OXYGEN SATURATION: 94 %

## 2022-09-28 LAB
ALBUMIN SERPL BCP-MCNC: 3.1 G/DL (ref 3.5–5.2)
ALP SERPL-CCNC: 79 U/L (ref 55–135)
ALT SERPL W/O P-5'-P-CCNC: 16 U/L (ref 10–44)
ANION GAP SERPL CALC-SCNC: 5 MMOL/L (ref 8–16)
AST SERPL-CCNC: 12 U/L (ref 10–40)
BASOPHILS # BLD AUTO: 0.07 K/UL (ref 0–0.2)
BASOPHILS NFR BLD: 0.8 % (ref 0–1.9)
BILIRUB SERPL-MCNC: 0.4 MG/DL (ref 0.1–1)
BUN SERPL-MCNC: 15 MG/DL (ref 8–23)
CALCIUM SERPL-MCNC: 8.3 MG/DL (ref 8.7–10.5)
CHLORIDE SERPL-SCNC: 105 MMOL/L (ref 95–110)
CO2 SERPL-SCNC: 28 MMOL/L (ref 23–29)
CREAT SERPL-MCNC: 1.1 MG/DL (ref 0.5–1.4)
DIFFERENTIAL METHOD: ABNORMAL
EOSINOPHIL # BLD AUTO: 0.3 K/UL (ref 0–0.5)
EOSINOPHIL NFR BLD: 2.9 % (ref 0–8)
ERYTHROCYTE [DISTWIDTH] IN BLOOD BY AUTOMATED COUNT: 12.8 % (ref 11.5–14.5)
EST. GFR  (NO RACE VARIABLE): >60 ML/MIN/1.73 M^2
ESTIMATED AVG GLUCOSE: 243 MG/DL (ref 68–131)
GLUCOSE SERPL-MCNC: 207 MG/DL (ref 70–110)
HBA1C MFR BLD: 10.1 % (ref 4–5.6)
HCT VFR BLD AUTO: 39.5 % (ref 40–54)
HGB BLD-MCNC: 13.1 G/DL (ref 14–18)
IMM GRANULOCYTES # BLD AUTO: 0.03 K/UL (ref 0–0.04)
IMM GRANULOCYTES NFR BLD AUTO: 0.3 % (ref 0–0.5)
LYMPHOCYTES # BLD AUTO: 2.2 K/UL (ref 1–4.8)
LYMPHOCYTES NFR BLD: 24.3 % (ref 18–48)
MCH RBC QN AUTO: 28.2 PG (ref 27–31)
MCHC RBC AUTO-ENTMCNC: 33.2 G/DL (ref 32–36)
MCV RBC AUTO: 85 FL (ref 82–98)
MONOCYTES # BLD AUTO: 0.9 K/UL (ref 0.3–1)
MONOCYTES NFR BLD: 9.7 % (ref 4–15)
NEUTROPHILS # BLD AUTO: 5.6 K/UL (ref 1.8–7.7)
NEUTROPHILS NFR BLD: 62 % (ref 38–73)
NRBC BLD-RTO: 0 /100 WBC
PLATELET # BLD AUTO: 196 K/UL (ref 150–450)
PMV BLD AUTO: 9.6 FL (ref 9.2–12.9)
POCT GLUCOSE: 210 MG/DL (ref 70–110)
POCT GLUCOSE: 228 MG/DL (ref 70–110)
POTASSIUM SERPL-SCNC: 4.6 MMOL/L (ref 3.5–5.1)
PROT SERPL-MCNC: 5.9 G/DL (ref 6–8.4)
RBC # BLD AUTO: 4.65 M/UL (ref 4.6–6.2)
SODIUM SERPL-SCNC: 138 MMOL/L (ref 136–145)
TROPONIN I SERPL DL<=0.01 NG/ML-MCNC: <0.006 NG/ML (ref 0–0.03)
TROPONIN I SERPL DL<=0.01 NG/ML-MCNC: <0.006 NG/ML (ref 0–0.03)
WBC # BLD AUTO: 9.07 K/UL (ref 3.9–12.7)

## 2022-09-28 PROCEDURE — 80053 COMPREHEN METABOLIC PANEL: CPT | Performed by: NURSE PRACTITIONER

## 2022-09-28 PROCEDURE — 25000003 PHARM REV CODE 250: Performed by: SURGERY

## 2022-09-28 PROCEDURE — 99900035 HC TECH TIME PER 15 MIN (STAT)

## 2022-09-28 PROCEDURE — 36415 COLL VENOUS BLD VENIPUNCTURE: CPT | Performed by: NURSE PRACTITIONER

## 2022-09-28 PROCEDURE — 94761 N-INVAS EAR/PLS OXIMETRY MLT: CPT

## 2022-09-28 PROCEDURE — 85025 COMPLETE CBC W/AUTO DIFF WBC: CPT | Performed by: NURSE PRACTITIONER

## 2022-09-28 PROCEDURE — 94799 UNLISTED PULMONARY SVC/PX: CPT

## 2022-09-28 PROCEDURE — 63600175 PHARM REV CODE 636 W HCPCS: Performed by: SURGERY

## 2022-09-28 PROCEDURE — 84484 ASSAY OF TROPONIN QUANT: CPT | Performed by: NURSE PRACTITIONER

## 2022-09-28 RX ORDER — HYDRALAZINE HYDROCHLORIDE 20 MG/ML
10 INJECTION INTRAMUSCULAR; INTRAVENOUS EVERY 4 HOURS PRN
Status: DISCONTINUED | OUTPATIENT
Start: 2022-09-28 | End: 2022-09-28 | Stop reason: HOSPADM

## 2022-09-28 RX ORDER — SODIUM CHLORIDE 9 MG/ML
INJECTION, SOLUTION INTRAVENOUS CONTINUOUS
Status: CANCELLED | OUTPATIENT
Start: 2022-09-28

## 2022-09-28 RX ADMIN — LOSARTAN POTASSIUM 100 MG: 50 TABLET, FILM COATED ORAL at 08:09

## 2022-09-28 RX ADMIN — CYANOCOBALAMIN TAB 1000 MCG 1000 MCG: 1000 TAB at 08:09

## 2022-09-28 RX ADMIN — AMLODIPINE BESYLATE 10 MG: 10 TABLET ORAL at 08:09

## 2022-09-28 RX ADMIN — Medication 100 MG: at 08:09

## 2022-09-28 RX ADMIN — ASPIRIN 81 MG CHEWABLE TABLET 81 MG: 81 TABLET CHEWABLE at 08:09

## 2022-09-28 RX ADMIN — CILOSTAZOL 100 MG: 100 TABLET ORAL at 08:09

## 2022-09-28 RX ADMIN — ATORVASTATIN CALCIUM 40 MG: 40 TABLET, FILM COATED ORAL at 08:09

## 2022-09-28 RX ADMIN — INSULIN ASPART 3 UNITS: 100 INJECTION, SOLUTION INTRAVENOUS; SUBCUTANEOUS at 02:09

## 2022-09-28 RX ADMIN — FOLIC ACID 1 MG: 1 TABLET ORAL at 08:09

## 2022-09-28 RX ADMIN — INSULIN ASPART 2 UNITS: 100 INJECTION, SOLUTION INTRAVENOUS; SUBCUTANEOUS at 05:09

## 2022-09-28 RX ADMIN — CHLORHEXIDINE GLUCONATE 0.12% ORAL RINSE 10 ML: 1.2 LIQUID ORAL at 08:09

## 2022-09-28 RX ADMIN — HYDRALAZINE HYDROCHLORIDE 10 MG: 20 INJECTION, SOLUTION INTRAMUSCULAR; INTRAVENOUS at 01:09

## 2022-09-28 RX ADMIN — HYDRALAZINE HYDROCHLORIDE 10 MG: 20 INJECTION, SOLUTION INTRAMUSCULAR; INTRAVENOUS at 09:09

## 2022-09-28 RX ADMIN — CITALOPRAM HYDROBROMIDE 20 MG: 10 TABLET ORAL at 08:09

## 2022-09-28 NOTE — ASSESSMENT & PLAN NOTE
Hx bradycardia   EKG showed Sinus bradycardia with 1st degree A-V block with nonspecific T wave changes   Check serial troponin   EF was calculated to be 60% on [facility] 2/2022   Holding parameters on BB  Monitor

## 2022-09-28 NOTE — NURSING
Patient has history of diabetes. Patient states he has been a diabetic for about 8 years. He takes his medication as ordered. However he states he hasn't checked his blood sugar in about 6 months. States he just stops eating a diabetic diet and checking his sugar sometimes. He knows the importance of both and states his goal is to start back monitoring his food choices and his blood sugar. He is aware of what an A1C level is and how to lower his. His last A1C level was 10.1. Additional educational handouts of food choices given to patient. My information left with patient to call for any additional needs.

## 2022-09-28 NOTE — HPI
The patient is a 66 yo male with CAD s/p LHC showed distal LCX , Orthostatic hypotension, DM, Fatty liver, Peripheral neuropathy, Former heavy drinker and smoker, PAD, Renal mass, AAA left iliac aneurysm 5.0cm, and Bialteral Carotid artery Stenosis who is s/p Left endarterectomy today by Dr. Fletcher. [facility] medicine has been consulted for medical management.   On assessment, The patient is AAOx3, eating his meal, denies pain. Denies Dizziness, focal deficits, CP, SOB.

## 2022-09-28 NOTE — PLAN OF CARE
[facility] - Med Surg  Initial Discharge Assessment       Primary Care Provider: Carlitos Michelle MD    Admission Diagnosis: Carotid stenosis, asymptomatic, left [I65.22]  Left carotid artery stenosis [I65.22]    Admission Date: 9/27/2022  Expected Discharge Date:     Discharge Barriers Identified: None    Payor: MEDICARE / Plan: MEDICARE PART A & B / Product Type: Government /     Extended Emergency Contact Information  Primary Emergency Contact: ELAINE Fong  Mobile Phone: [phone]  Relation: Son    Discharge Plan A: Home  Discharge Plan B: Home      [facility], [employer] - [place], [place] - [address]  [address]  [place] [address]  Phone: [phone] Fax: [phone]    [facility], [employer] - [place], [place] - [address]  [address]  [place] [address]  Phone: [phone] Fax: [phone]      Initial Assessment (most recent)       Adult Discharge Assessment - 09/28/22 1059          Discharge Assessment    Assessment Type Discharge Planning Assessment     Confirmed/corrected address, phone number and insurance Yes     Confirmed Demographics Correct on Facesheet     Source of Information patient     Communicated GLADIS with patient/caregiver Date not available/Unable to determine     Reason For Admission carotid stenosis     Lives With alone     Facility Arrived From: home     Do you expect to return to your current living situation? Yes     Do you have help at home or someone to help you manage your care at home? No     Prior to hospitilization cognitive status: Alert/Oriented     Current cognitive status: Alert/Oriented     Walking or Climbing Stairs Difficulty ambulation difficulty, requires equipment     Dressing/Bathing Difficulty none     Home Accessibility wheelchair accessible     Home Layout Able to live on 1st floor     Equipment Currently Used at Home walker, rolling;cane, quad   use DME as needed    Readmission within 30 days? No     Patient currently being followed by  outpatient case management? No     Do you currently have service(s) that help you manage your care at home? No     Do you take prescription medications? Yes     Do you have prescription coverage? Yes     Coverage Medicare A&B     Do you have any problems affording any of your prescribed medications? No     Is the patient taking medications as prescribed? yes     Who is going to help you get home at discharge? patients daughter n law     How do you get to doctors appointments? car, drives self;family or friend will provide     Are you on dialysis? No     Do you take coumadin? No     Discharge Plan A Home     Discharge Plan B Home     DME Needed Upon Discharge  none     Discharge Plan discussed with: Patient     Discharge Barriers Identified None        Physical Activity    On average, how many days per week do you engage in moderate to strenuous exercise (like a brisk walk)? 3 days     On average, how many minutes do you engage in exercise at this level? 30 min        Financial Resource Strain    How hard is it for you to pay for the very basics like food, housing, medical care, and heating? Not hard at all        Housing Stability    In the last 12 months, was there a time when you were not able to pay the mortgage or rent on time? Yes     In the last 12 months, how many places have you lived? 2     In the last 12 months, was there a time when you did not have a steady place to sleep or slept in a shelter (including now)? Yes        Transportation Needs    In the past 12 months, has lack of transportation kept you from medical appointments or from getting medications? Yes     In the past 12 months, has lack of transportation kept you from meetings, work, or from getting things needed for daily living? Yes        Food Insecurity    Within the past 12 months, you worried that your food would run out before you got the money to buy more. Never true        Stress    Do you feel stress - tense, restless, nervous, or  anxious, or unable to sleep at night because your mind is troubled all the time - these days? To some extent        Social Connections    In a typical week, how many times do you talk on the phone with family, friends, or neighbors? More than three times a week     How often do you get together with friends or relatives? More than three times a week     How often do you attend Buddhist or Lutheran services? Never     Do you belong to any clubs or organizations such as Buddhist groups, unions, fraternal or athletic groups, or school groups? Yes     How often do you attend meetings of the clubs or organizations you belong to? More than 4 times per year     Are you , , , , never , or living with a partner?         Alcohol Use    Q1: How often do you have a drink containing alcohol? Never     Q2: How many drinks containing alcohol do you have on a typical day when you are drinking? Patient does not drink     Q3: How often do you have six or more drinks on one occasion? Never                      Initial assessment completed with patient. Patient reports independence with ADL's  prior to hospitalization. Patient uses a walker and a cane at home as needed. Patient currently has no cm needs upon DC. CM will continue following to assist with other needs.   CM provided a transitional care folder, information on advanced directives, information on pharmacy bedside delivery, and discharge planning begins on admission with contact information for any needs/questions.

## 2022-09-28 NOTE — DISCHARGE SUMMARY
[facility] Surg  Vascular Surgery  Discharge Summary      Patient Name: Lamine Fong  MRN: [id number]  Admission Date: 9/27/2022  Hospital Length of Stay: 1 days  Discharge Date and Time: 9/28/2022  5:20 PM  Attending Physician: Bharat Fletcher IV, MD   Discharging Provider: Modesta Finch NP  Primary Care Provider: Carlitos Michelle MD     HPI:     Procedure(s) (LRB):  ENDARTERECTOMY-CAROTID (Left)     Hospital Course:     Consults:   Consults (From admission, onward)          Status Ordering Provider     Inpatient consult to Hospitalist  Once        Provider:  Siddhartha Gates MD    Acknowledged BHARAT FLETCHER IV            Significant Diagnostic Studies: Labs: BMP: No results for input(s): GLU, NA, K, CL, CO2, BUN, CREATININE, CALCIUM, MG in the last 48 hours. and CBC No results for input(s): WBC, HGB, HCT, PLT in the last 48 hours.    Pending Diagnostic Studies:       Procedure Component Value Units Date/Time    Specimen to Pathology, Surgery Other (Vascular) [[id number]] Collected: 09/27/22 1421    Order Status: Sent Lab Status: In process Updated: 09/28/22 0843    Specimen: Tissue           Final Active Diagnoses:    Diagnosis Date Noted POA    PRINCIPAL PROBLEM:  Bilateral carotid artery stenosis [I65.23] 01/25/2022 Yes    Renal mass, right [N28.89] 09/27/2022 Yes    Gallbladder mass [K82.8] 09/27/2022 Yes    Coronary artery disease of native artery of native heart with stable angina pectoris [I25.118] 03/08/2022 Yes    Bradycardia [R00.1] 10/04/2021 Yes    Neuropathy [G62.9] 10/04/2021 Yes     Chronic    Type 2 diabetes mellitus with foot ulcer, without long-term current use of insulin [E11.621, L97.509] 10/02/2021 Yes     Chronic    Aneurysm of left iliac artery [I72.3] 10/02/2021 Yes    Primary hypertension [I10] 10/02/2021 Yes      Problems Resolved During this Admission:      Discharged Condition: good    Disposition: Home or Self Care    Follow Up:    Dr. Fletcher in 2 weeks  Patient  Instructions:      Diet general   Order Comments: Heart Healthy     Lifting restrictions   Order Comments: No lifting >10 pounds until cleared by vascular surgeon (2 weeks)     Other restrictions (specify):   Order Comments: No driving and do not submerge surgical site underwater until cleared by vascular surgeon.  OK to shower.     No dressing needed     Medications:  Reconciled Home Medications:      Medication List        CONTINUE taking these medications      albuterol 90 mcg/actuation inhaler  Commonly known as: PROVENTIL/VENTOLIN HFA  Inhale 2 puffs into the lungs every 4 (four) hours as needed for Wheezing or Shortness of Breath.     amLODIPine 10 MG tablet  Commonly known as: NORVASC  Take 1 tablet (10 mg total) by mouth once daily.     aspirin 81 MG EC tablet  Commonly known as: ECOTRIN  Take 1 tablet (81 mg total) by mouth once daily.     BLOOD PRESSURE CUFF Misc  Generic drug: miscellaneous medical supply  1 each by Misc.(Non-Drug; Combo Route) route 2 (two) times a day.     blood sugar diagnostic Strp  To check BG 2 times daily, to use with insurance preferred meter     blood-glucose meter kit  To check BG 2 times daily, to use with insurance preferred meter     cilostazoL 100 MG Tab  Commonly known as: PLETAL  Take 1 tablet (100 mg total) by mouth 2 (two) times daily.     citalopram 20 MG tablet  Commonly known as: CeleXA  Take 1 tablet (20 mg total) by mouth once daily.     fluorouraciL 5 % cream  Commonly known as: EFUDEX  AAA bilateral arms bid x 4 weeks     folic acid 1 MG tablet  Commonly known as: FOLVITE  Take 1 tablet (1 mg total) by mouth once daily.     gabapentin 300 MG capsule  Commonly known as: NEURONTIN  Take 3 capsules (900 mg total) by mouth every evening.     lancets [facility]  To check BG 2 times daily, to use with insurance preferred meter     losartan 100 MG tablet  Commonly known as: COZAAR  Take 1 tablet (100 mg total) by mouth once daily.     metFORMIN 1000 MG tablet  Commonly known  as: GLUCOPHAGE  Take 1 tablet (1,000 mg total) by mouth 2 (two) times daily with meals.     metoprolol succinate 25 MG 24 hr tablet  Commonly known as: TOPROL-XL  Take 1 tablet (25 mg total) by mouth once daily.     simvastatin 20 MG tablet  Commonly known as: ZOCOR  Take 1 tablet (20 mg total) by mouth every evening.     TylenoL 325 MG tablet  Generic drug: acetaminophen  Take 650 mg by mouth every 6 (six) hours as needed.     VITAMIN B-1 (MONONITRATE) 100 mg Tab  Generic drug: thiamine mononitrate (vit B1)  Take 100 mg by mouth every morning.     VITAMIN B-12 1000 MCG tablet  Generic drug: cyanocobalamin  Take 1,000 mcg by mouth every 7 days.              Modesta Finch NP  Vascular Surgery  [facility] - Med Surg

## 2022-09-28 NOTE — NURSING
AVS reviewed with patient; patient verbalizes understanding; IV removed with no complications and catheter intact, telemetry leads removed and place in basket at nurses station. Patient stable and ready for discharge home on self care with son.

## 2022-09-28 NOTE — SUBJECTIVE & OBJECTIVE
Interval History:     Review of Systems   Constitutional:  Negative for appetite change, chills, diaphoresis, fatigue and fever.   HENT:  Negative for congestion, nosebleeds, sore throat and trouble swallowing.    Eyes:  Negative for pain, discharge and visual disturbance.   Respiratory:  Negative for apnea, cough, chest tightness, shortness of breath, wheezing and stridor.    Cardiovascular:  Negative for chest pain, palpitations and leg swelling.   Gastrointestinal:  Negative for abdominal distention, abdominal pain, blood in stool, constipation, diarrhea, nausea and vomiting.   Endocrine: Negative for cold intolerance and heat intolerance.   Genitourinary:  Negative for difficulty urinating, dysuria, flank pain, frequency and urgency.   Musculoskeletal:  Negative for arthralgias, back pain, joint swelling, myalgias, neck pain and neck stiffness.   Skin:  Positive for wound (left neck incision). Negative for rash.   Allergic/Immunologic: Negative for food allergies and immunocompromised state.   Neurological:  Negative for dizziness, seizures, syncope, facial asymmetry, weakness, light-headedness and headaches.   Hematological:  Negative for adenopathy.   Psychiatric/Behavioral:  Negative for agitation, behavioral problems and confusion. The patient is not nervous/anxious.    Objective:     Vital Signs (Most Recent):  Temp: 98.2 °F (36.8 °C) (09/28/22 1449)  Pulse: (!) 56 (09/28/22 1449)  Resp: 16 (09/28/22 1449)  BP: (!) 162/72 (09/28/22 1449)  SpO2: 96 % (09/28/22 1449)   Vital Signs (24h Range):  Temp:  [97.2 °F (36.2 °C)-98.2 °F (36.8 °C)] 98.2 °F (36.8 °C)  Pulse:  [36-56] 56  Resp:  [10-20] 16  SpO2:  [92 %-99 %] 96 %  BP: (102-186)/(53-74) 162/72  Arterial Line BP: (129-134)/(50-51) 134/51     Weight: 117.1 kg (258 lb 2.5 oz)  Body mass index is 31.42 kg/m².    Intake/Output Summary (Last 24 hours) at 9/28/2022 1528  Last data filed at 9/28/2022 1200  Gross per 24 hour   Intake 1080.34 ml   Output --   Net  1080.34 ml          Significant Labs: All pertinent labs within the past 24 hours have been reviewed.  CBC:   Recent Labs   Lab 09/27/22 2001 09/28/22  0346   WBC 11.69 9.07   HGB 13.9* 13.1*   HCT 42.1 39.5*    196     CMP:   Recent Labs   Lab 09/27/22 2001 09/28/22  0346   * 138   K 5.0 4.6    105   CO2 25 28   * 207*   BUN 14 15   CREATININE 1.0 1.1   CALCIUM 8.2* 8.3*   PROT 5.9* 5.9*   ALBUMIN 3.3* 3.1*   BILITOT 0.5 0.4   ALKPHOS 86 79   AST 17 12   ALT 19 16   ANIONGAP 8 5*     Troponin:   Recent Labs   Lab 09/27/22 2001 09/28/22  0346 09/28/22  0909   TROPONINI <0.006 <0.006 <0.006       Significant Imaging:

## 2022-09-28 NOTE — ASSESSMENT & PLAN NOTE
Hx bradycardia   EKG showed Sinus bradycardia with 1st degree A-V block with nonspecific T wave changes   Check serial troponin   EF was calculated to be 60% on [facility] 2/2022   Holding parameters on BB  Monitor

## 2022-09-28 NOTE — ASSESSMENT & PLAN NOTE
Patient's FSGs are controlled on current medication regimen.  Last A1c reviewed-   Lab Results   Component Value Date    HGBA1C 5.8 (H) 04/18/2022     Most recent fingerstick glucose reviewed-   Recent Labs   Lab 09/27/22  1008 09/27/22  1143 09/27/22  1445 09/27/22 2022   POCTGLUCOSE 261* 173* 191* 213*     Current correctional scale  Low  Maintain anti-hyperglycemic dose as follows-   Antihyperglycemics (From admission, onward)    Start     Stop Route Frequency Ordered    09/27/22 1544  insulin aspart U-100 pen 0-5 Units         -- SubQ Before meals & nightly PRN 09/27/22 1444        Hold Oral hypoglycemics while patient is in the hospital.

## 2022-09-28 NOTE — PLAN OF CARE
Problem: Adult Inpatient Plan of Care  Goal: Plan of Care Review  Outcome: Ongoing, Progressing     Problem: Diabetes Comorbidity  Goal: Blood Glucose Level Within Targeted Range  Outcome: Ongoing, Progressing     Patient remains free from injury. Safety precautions maintained. No s/s of acute distress. IVF infusing. Cardiac monitoring in place. Q4 neuro checks complete. Pt education about care completed.

## 2022-09-28 NOTE — ASSESSMENT & PLAN NOTE
1.6 cm inferior right renal lesion concerning for enhancing renal mass seen on recent CTA runoff  F/U with Urology

## 2022-09-28 NOTE — DISCHARGE INSTRUCTIONS
Wound Care      Doctors closed the cut on your skin with a special kind of skin glue. In a week or so, the skin glue will fall off on its own.  What care is needed at home?   Call your regular doctor to let them know you were in the ED. Make a follow-up appointment if you were told to.  Do not pick at the skin glue. It will fall off on its own in 5 to 10 days.  Keep your wound clean and dry for the first 24 hours. After 24 hours, you can gently wash the wound with soap and water or take a shower. Gently pat the wound dry. Do not soak your wound by bathing or swimming.  Do not use an antibiotic ointment on the skin glue. If you want, you can cover your wound with a bandage. You can also leave it open to air if you prefer.  Wash your hands before and after you touch your wound or bandage.  If you still have skin glue in place after 10 days, you can use petroleum jelly or antibiotic ointment to loosen it.  Avoid activities that could hurt the area of your wound for a few weeks. If you hurt the same part of your body again, the wound can open up.  When do I need to get emergency help?   Return to the ED if:   You have a fever of 100.4°F (38°C) or higher or chills.  Your skin around the wound is swollen, red, or warm.  Your wound has thick yellow or green drainage.  The wound opens up.  When do I need to call the doctor?   You have new or worsening symptoms.  Last Reviewed Date   2021-02-16  Consumer Information Use and Disclaimer   This information is not specific medical advice and does not replace information you receive from your health care provider. This is only a brief summary of general information. It does NOT include all information about conditions, illnesses, injuries, tests, procedures, treatments, therapies, discharge instructions or life-style choices that may apply to you. You must talk with your health care provider for complete information about your health and treatment options. This information should  not be used to decide whether or not to accept your health care providers advice, instructions or recommendations. Only your health care provider has the knowledge and training to provide advice that is right for you.  Copyright   Copyright © 2021 [employer] and its affiliates and/or licensors. All rights reserved.

## 2022-09-28 NOTE — HOSPITAL COURSE
Mr Fong is a 67 year old male who underwent left carotid endarterectomy on yesterday by Dr Fletcher. Today, patient feeling well, labs stable. Awaiting discharge with BP stable.

## 2022-09-28 NOTE — CONSULTS
[facility]  Consult Note    Patient Name: Lamine Fong  MRN: [id number]  Admission Date: 9/27/2022  Hospital Length of Stay: 0 days  Attending Physician: Dr. Bourgeois  Primary Care Provider: Carlitos Michelle MD           Patient information was obtained from patient and ER records.     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 Fletcher. Hospital medicine has been consulted for medical management.   On assessment, The patient is AAOx3, eating his meal, denies pain. Denies Dizziness, focal deficits, CP, SOB.       Past Medical History:   Diagnosis Date    Alcohol abuse     Anticoagulant long-term use     Aortic aneurysm     Artery occlusion     Carotid artery occlusion     COPD (chronic obstructive pulmonary disease)     no home O2    Coronary artery disease     Depression     Diabetes mellitus     Hypertension     Hypokalemia     Hypomagnesemia     SHANELLE (obstructive sleep apnea)     without cpap       Past Surgical History:   Procedure Laterality Date    HERNIA REPAIR      umbilical    LEFT HEART CATHETERIZATION Left 2/28/2022    Procedure: CATHETERIZATION, HEART, LEFT;  Surgeon: Jadiel Simons MD;  Location: [facility] CATH LAB;  Service: Cardiology;  Laterality: Left;       Review of patient's allergies indicates:  No Known Allergies    No current facility-administered medications on file prior to encounter.     Current Outpatient Medications on File Prior to Encounter   Medication Sig    albuterol (PROVENTIL/VENTOLIN HFA) 90 mcg/actuation inhaler Inhale 2 puffs into the lungs every 4 (four) hours as needed  for Wheezing or Shortness of Breath.    amLODIPine (NORVASC) 10 MG tablet Take 1 tablet (10 mg total) by mouth once daily.    aspirin (ECOTRIN) 81 MG EC tablet Take 1 tablet (81 mg total) by mouth once daily.    cilostazoL (PLETAL) 100 MG Tab Take 1 tablet (100 mg total) by mouth 2 (two) times daily.    citalopram (CELEXA) 20 MG tablet Take 1 tablet (20 mg total) by mouth once daily.    folic acid (FOLVITE) 1 MG tablet Take 1 tablet (1 mg total) by mouth once daily.    gabapentin (NEURONTIN) 300 MG capsule Take 3 capsules (900 mg total) by mouth every evening.    losartan (COZAAR) 100 MG tablet Take 1 tablet (100 mg total) by mouth once daily.    metFORMIN (GLUCOPHAGE) 1000 MG tablet Take 1 tablet (1,000 mg total) by mouth 2 (two) times daily with meals.    metoprolol succinate (TOPROL-XL) 25 MG 24 hr tablet Take 1 tablet (25 mg total) by mouth once daily.    simvastatin (ZOCOR) 20 MG tablet Take 1 tablet (20 mg total) by mouth every evening.    TYLENOL 325 mg tablet Take 650 mg by mouth every 6 (six) hours as needed.    VITAMIN B-1, MONONITRATE, 100 mg Tab Take 100 mg by mouth every morning.    VITAMIN B-12 1000 MCG tablet Take 1,000 mcg by mouth every 7 days.    BLOOD PRESSURE CUFF Misc 1 each by Misc.(Non-Drug; Combo Route) route 2 (two) times a day.    blood sugar diagnostic Strp To check BG 2 times daily, to use with insurance preferred meter    blood-glucose meter kit To check BG 2 times daily, to use with insurance preferred meter    fluorouraciL (EFUDEX) 5 % cream AAA bilateral arms bid x 4 weeks    lancets Misc To check BG 2 times daily, to use with insurance preferred meter     Family History       Problem Relation (Age of Onset)    Diabetes Mellitus Father          Tobacco Use    Smoking status: Former     Packs/day: 1.50     Years: 44.00     Pack years: 66.00     Types: Vaping with nicotine, Cigars, Cigarettes     Start date: 1/1/1968     Quit date: 12/6/2021     Years since quitting:  0.8    Smokeless tobacco: Current    Tobacco comments:     Hold midnight prior to sx   Substance and Sexual Activity    Alcohol use: Not Currently     Comment: hold 72hrs prior to sx    Drug use: No    Sexual activity: Yes     Partners: Female     Review of Systems   Constitutional:  Negative for appetite change, chills, diaphoresis, fatigue and fever.   HENT:  Negative for congestion, nosebleeds, sore throat and trouble swallowing.    Eyes:  Negative for pain, discharge and visual disturbance.   Respiratory:  Negative for apnea, cough, chest tightness, shortness of breath, wheezing and stridor.    Cardiovascular:  Negative for chest pain, palpitations and leg swelling.   Gastrointestinal:  Negative for abdominal distention, abdominal pain, blood in stool, constipation, diarrhea, nausea and vomiting.   Endocrine: Negative for cold intolerance and heat intolerance.   Genitourinary:  Negative for difficulty urinating, dysuria, flank pain, frequency and urgency.   Musculoskeletal:  Negative for arthralgias, back pain, joint swelling, myalgias, neck pain and neck stiffness.   Skin:  Positive for wound (left neck incision). Negative for rash.   Allergic/Immunologic: Negative for food allergies and immunocompromised state.   Neurological:  Negative for dizziness, seizures, syncope, facial asymmetry, weakness, light-headedness and headaches.   Hematological:  Negative for adenopathy.   Psychiatric/Behavioral:  Negative for agitation, behavioral problems and confusion. The patient is not nervous/anxious.    Objective:     Vital Signs (Most Recent):  Temp: 97.7 °F (36.5 °C) (09/27/22 2020)  Pulse: (!) 55 (09/27/22 2027)  Resp: 16 (09/27/22 2027)  BP: (!) 115/53 (09/27/22 2020)  SpO2: 95 % (09/27/22 2027)   Vital Signs (24h Range):  Temp:  [97.5 °F (36.4 °C)-97.8 °F (36.6 °C)] 97.7 °F (36.5 °C)  Pulse:  [42-67] 55  Resp:  [10-18] 16  SpO2:  [92 %-100 %] 95 %  BP: (102-151)/(53-69) 115/53  Arterial Line BP:  (120-141)/(45-56) 134/51     Weight: 111.8 kg (246 lb 9.4 oz)  Body mass index is 30.02 kg/m².    Physical Exam  Vitals and nursing note reviewed.   Constitutional:       Appearance: He is well-developed.   HENT:      Head: Normocephalic and atraumatic.      Nose: Nose normal.   Eyes:      General: No scleral icterus.     Pupils: Pupils are equal, round, and reactive to light.   Neck:      Comments: Left neck incision C/D/I with dermabond   Cardiovascular:      Rate and Rhythm: Regular rhythm. Bradycardia present.      Heart sounds: Normal heart sounds. No murmur heard.    No friction rub. No gallop.   Pulmonary:      Effort: Pulmonary effort is normal. No respiratory distress.      Breath sounds: Normal breath sounds. No wheezing.   Abdominal:      General: Bowel sounds are normal. There is no distension.      Palpations: Abdomen is soft.      Tenderness: There is no abdominal tenderness.   Musculoskeletal:         General: Normal range of motion.      Cervical back: Normal range of motion and neck supple.   Skin:     General: Skin is warm and dry.      Findings: No rash.   Neurological:      Mental Status: He is alert and oriented to person, place, and time.      Cranial Nerves: No cranial nerve deficit.   Psychiatric:         Behavior: Behavior normal.       Significant Labs: All pertinent labs within the past 24 hours have been reviewed.    Significant Imaging: I have reviewed all pertinent imaging results/findings within the past 24 hours.    Assessment/Plan:     * Bilateral carotid artery stenosis  S/p Left endarterectomy 9/27/22 per Dr. Fletcher  Pain control  Vascular surgery following       Bradycardia  Hx bradycardia   EKG showed Sinus bradycardia with 1st degree A-V block with nonspecific T wave changes   Check serial troponin   EF was calculated to be 60% on [facility] 2/2022   Holding parameters on BB  Monitor       Gallbladder mass  1.9 cm nonspecific hyperdensity within the gallbladder which appears to  demonstrate arterial enhancement, concerning for polyp or neoplasm.  Further evaluation with gallbladder ultrasound recommended  Seen on recent CTA with runoff  F/U with general surgery    Renal mass, right  1.6 cm inferior right renal lesion concerning for enhancing renal mass seen on recent CTA runoff  F/U with Urology    Coronary artery disease of native artery of native heart with stable angina pectoris  CAD s/p LHC showed distal LCX  2/2022  [place] CP/SOB   Cont ASA, BB, Statin, ARB        Neuropathy  Cont gabapentin       Type 2 diabetes mellitus with foot ulcer, without long-term current use of insulin  Patient's FSGs are controlled on current medication regimen.  Last A1c reviewed-   Lab Results   Component Value Date    HGBA1C 5.8 (H) 04/18/2022     Most recent fingerstick glucose reviewed-   Recent Labs   Lab 09/27/22  1008 09/27/22  1143 09/27/22  1445 09/27/22 2022   POCTGLUCOSE 261* 173* 191* 213*     Current correctional scale  Low  Maintain anti-hyperglycemic dose as follows-   Antihyperglycemics (From admission, onward)    Start     Stop Route Frequency Ordered    09/27/22 1544  insulin aspart U-100 pen 0-5 Units         -- SubQ Before meals & nightly PRN 09/27/22 1444        Hold Oral hypoglycemics while patient is in the hospital.    Primary hypertension  Bp stable   Cont Toprol. Amlodipine, and Losartan with parameters       Aneurysm of left iliac artery  AAA left iliac aneurysm 5.0cm  Followed by Dr. Simons      VTE Risk Mitigation (From admission, onward)         Ordered     enoxaparin injection 40 mg  Daily         09/27/22 1427     IP VTE HIGH RISK PATIENT  Once         09/27/22 1427     Place sequential compression device  Until discontinued         09/27/22 1427                    Thank you for your consult. I will follow-up with patient. Please contact us if you have any additional questions.    Neena Lisa NP  Department of Hospital Medicine   [facility] - Med Surg

## 2022-09-28 NOTE — SUBJECTIVE & OBJECTIVE
Past Medical History:   Diagnosis Date    Alcohol abuse     Anticoagulant long-term use     Aortic aneurysm     Artery occlusion     Carotid artery occlusion     COPD (chronic obstructive pulmonary disease)     no home O2    Coronary artery disease     Depression     Diabetes mellitus     Hypertension     Hypokalemia     Hypomagnesemia     SHANELLE (obstructive sleep apnea)     without cpap       Past Surgical History:   Procedure Laterality Date    HERNIA REPAIR      umbilical    LEFT HEART CATHETERIZATION Left 2/28/2022    Procedure: CATHETERIZATION, HEART, LEFT;  Surgeon: Jadiel Simons MD;  Location: [facility] CATH LAB;  Service: Cardiology;  Laterality: Left;       Review of patient's allergies indicates:  No Known Allergies    No current facility-administered medications on file prior to encounter.     Current Outpatient Medications on File Prior to Encounter   Medication Sig    albuterol (PROVENTIL/VENTOLIN HFA) 90 mcg/actuation inhaler Inhale 2 puffs into the lungs every 4 (four) hours as needed for Wheezing or Shortness of Breath.    amLODIPine (NORVASC) 10 MG tablet Take 1 tablet (10 mg total) by mouth once daily.    aspirin (ECOTRIN) 81 MG EC tablet Take 1 tablet (81 mg total) by mouth once daily.    cilostazoL (PLETAL) 100 MG Tab Take 1 tablet (100 mg total) by mouth 2 (two) times daily.    citalopram (CELEXA) 20 MG tablet Take 1 tablet (20 mg total) by mouth once daily.    folic acid (FOLVITE) 1 MG tablet Take 1 tablet (1 mg total) by mouth once daily.    gabapentin (NEURONTIN) 300 MG capsule Take 3 capsules (900 mg total) by mouth every evening.    losartan (COZAAR) 100 MG tablet Take 1 tablet (100 mg total) by mouth once daily.    metFORMIN (GLUCOPHAGE) 1000 MG tablet Take 1 tablet (1,000 mg total) by mouth 2 (two) times daily with meals.    metoprolol succinate (TOPROL-XL) 25 MG 24 hr tablet Take 1 tablet (25 mg total) by mouth once daily.    simvastatin (ZOCOR) 20 MG tablet Take 1 tablet (20 mg total) by  mouth every evening.    TYLENOL 325 mg tablet Take 650 mg by mouth every 6 (six) hours as needed.    VITAMIN B-1, MONONITRATE, 100 mg Tab Take 100 mg by mouth every morning.    VITAMIN B-12 1000 MCG tablet Take 1,000 mcg by mouth every 7 days.    BLOOD PRESSURE CUFF Misc 1 each by Misc.(Non-Drug; Combo Route) route 2 (two) times a day.    blood sugar diagnostic Strp To check BG 2 times daily, to use with insurance preferred meter    blood-glucose meter kit To check BG 2 times daily, to use with insurance preferred meter    fluorouraciL (EFUDEX) 5 % cream AAA bilateral arms bid x 4 weeks    lancets Misc To check BG 2 times daily, to use with insurance preferred meter     Family History       Problem Relation (Age of Onset)    Diabetes Mellitus Father          Tobacco Use    Smoking status: Former     Packs/day: 1.50     Years: 44.00     Pack years: 66.00     Types: Vaping with nicotine, Cigars, Cigarettes     Start date: 1968     Quit date: 2021     Years since quittin.8    Smokeless tobacco: Current    Tobacco comments:     Hold midnight prior to sx   Substance and Sexual Activity    Alcohol use: Not Currently     Comment: hold 72hrs prior to sx    Drug use: No    Sexual activity: Yes     Partners: Female     Review of Systems   Constitutional:  Negative for appetite change, chills, diaphoresis, fatigue and fever.   HENT:  Negative for congestion, nosebleeds, sore throat and trouble swallowing.    Eyes:  Negative for pain, discharge and visual disturbance.   Respiratory:  Negative for apnea, cough, chest tightness, shortness of breath, wheezing and stridor.    Cardiovascular:  Negative for chest pain, palpitations and leg swelling.   Gastrointestinal:  Negative for abdominal distention, abdominal pain, blood in stool, constipation, diarrhea, nausea and vomiting.   Endocrine: Negative for cold intolerance and heat intolerance.   Genitourinary:  Negative for difficulty urinating, dysuria, flank pain,  frequency and urgency.   Musculoskeletal:  Negative for arthralgias, back pain, joint swelling, myalgias, neck pain and neck stiffness.   Skin:  Positive for wound (left neck incision). Negative for rash.   Allergic/Immunologic: Negative for food allergies and immunocompromised state.   Neurological:  Negative for dizziness, seizures, syncope, facial asymmetry, weakness, light-headedness and headaches.   Hematological:  Negative for adenopathy.   Psychiatric/Behavioral:  Negative for agitation, behavioral problems and confusion. The patient is not nervous/anxious.    Objective:     Vital Signs (Most Recent):  Temp: 97.7 °F (36.5 °C) (09/27/22 2020)  Pulse: (!) 55 (09/27/22 2027)  Resp: 16 (09/27/22 2027)  BP: (!) 115/53 (09/27/22 2020)  SpO2: 95 % (09/27/22 2027)   Vital Signs (24h Range):  Temp:  [97.5 °F (36.4 °C)-97.8 °F (36.6 °C)] 97.7 °F (36.5 °C)  Pulse:  [42-67] 55  Resp:  [10-18] 16  SpO2:  [92 %-100 %] 95 %  BP: (102-151)/(53-69) 115/53  Arterial Line BP: (120-141)/(45-56) 134/51     Weight: 111.8 kg (246 lb 9.4 oz)  Body mass index is 30.02 kg/m².    Physical Exam  Vitals and nursing note reviewed.   Constitutional:       Appearance: He is well-developed.   HENT:      Head: Normocephalic and atraumatic.      Nose: Nose normal.   Eyes:      General: No scleral icterus.     Pupils: Pupils are equal, round, and reactive to light.   Neck:      Comments: Left neck incision C/D/I with dermabond   Cardiovascular:      Rate and Rhythm: Regular rhythm. Bradycardia present.      Heart sounds: Normal heart sounds. No murmur heard.    No friction rub. No gallop.   Pulmonary:      Effort: Pulmonary effort is normal. No respiratory distress.      Breath sounds: Normal breath sounds. No wheezing.   Abdominal:      General: Bowel sounds are normal. There is no distension.      Palpations: Abdomen is soft.      Tenderness: There is no abdominal tenderness.   Musculoskeletal:         General: Normal range of motion.       Cervical back: Normal range of motion and neck supple.   Skin:     General: Skin is warm and dry.      Findings: No rash.   Neurological:      Mental Status: He is alert and oriented to person, place, and time.      Cranial Nerves: No cranial nerve deficit.   Psychiatric:         Behavior: Behavior normal.       Significant Labs: All pertinent labs within the past 24 hours have been reviewed.    Significant Imaging: I have reviewed all pertinent imaging results/findings within the past 24 hours.

## 2022-09-28 NOTE — ASSESSMENT & PLAN NOTE
1.9 cm nonspecific hyperdensity within the gallbladder which appears to demonstrate arterial enhancement, concerning for polyp or neoplasm.  Further evaluation with gallbladder ultrasound recommended  Seen on recent CTA with runoff  F/U with general surgery

## 2022-09-28 NOTE — PLAN OF CARE
[facility] - Med Surg  Discharge Final Note    Primary Care Provider: Carlitos Michelle MD    Expected Discharge Date: 9/28/2022    Final Discharge Note (most recent)       Final Note - 09/28/22 1154          Final Note    Assessment Type Final Discharge Note     Anticipated Discharge Disposition Home or Self Care     Hospital Resources/Appts/Education Provided Provided patient/caregiver with written discharge plan information;Appointments scheduled and added to AVS        Post-Acute Status    Discharge Delays None known at this time                     Important Message from Medicare      Patient to MD home with self care. FU appt scheduled and added to AVS. No other cm needs.

## 2022-09-28 NOTE — ASSESSMENT & PLAN NOTE
S/p Left endarterectomy 9/27/22 per Dr. Fletcher  Pain control  Vascular surgery following     9/28  Left neck incision intact   Vascular Surgery managing

## 2022-09-28 NOTE — NURSING
Patient's son will come tomorrow (9/29) morning to pick-up patient's prescription from nurses station. Place prescription in charge nurses drawer.

## 2022-09-28 NOTE — PROGRESS NOTES
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 Fletcher. Hospital medicine has been consulted for medical management.   On assessment, The patient is AAOx3, eating his meal, denies pain. Denies Dizziness, focal deficits, CP, SOB.       Overview/Hospital Course:  Mr Fong is a 67 year old male who underwent left carotid endarterectomy on yesterday by Dr Fletcher. Today, patient feeling well, labs stable. Awaiting discharge with BP stable.       Interval History:     Review of Systems   Constitutional:  Negative for appetite change, chills, diaphoresis, fatigue and fever.   HENT:  Negative for congestion, nosebleeds, sore throat and trouble swallowing.    Eyes:  Negative for pain, discharge and visual disturbance.   Respiratory:  Negative for apnea, cough, chest tightness, shortness of breath, wheezing and stridor.    Cardiovascular:  Negative for chest pain, palpitations and leg swelling.   Gastrointestinal:  Negative for abdominal distention, abdominal pain, blood in stool, constipation, diarrhea, nausea and vomiting.   Endocrine: Negative for cold intolerance and heat intolerance.   Genitourinary:  Negative for difficulty urinating, dysuria, flank pain, frequency and urgency.   Musculoskeletal:  Negative for arthralgias, back pain, joint swelling, myalgias, neck pain and neck stiffness.   Skin:  Positive for wound (left  neck incision). Negative for rash.   Allergic/Immunologic: Negative for food allergies and immunocompromised state.   Neurological:  Negative for dizziness, seizures, syncope, facial asymmetry, weakness, light-headedness and headaches.   Hematological:  Negative for adenopathy.   Psychiatric/Behavioral:  Negative for agitation, behavioral problems and confusion. The patient is not nervous/anxious.    Objective:     Vital Signs (Most Recent):  Temp: 98.2 °F (36.8 °C) (09/28/22 1449)  Pulse: (!) 56 (09/28/22 1449)  Resp: 16 (09/28/22 1449)  BP: (!) 162/72 (09/28/22 1449)  SpO2: 96 % (09/28/22 1449)   Vital Signs (24h Range):  Temp:  [97.2 °F (36.2 °C)-98.2 °F (36.8 °C)] 98.2 °F (36.8 °C)  Pulse:  [36-56] 56  Resp:  [10-20] 16  SpO2:  [92 %-99 %] 96 %  BP: (102-186)/(53-74) 162/72  Arterial Line BP: (129-134)/(50-51) 134/51     Weight: 117.1 kg (258 lb 2.5 oz)  Body mass index is 31.42 kg/m².    Intake/Output Summary (Last 24 hours) at 9/28/2022 1528  Last data filed at 9/28/2022 1200  Gross per 24 hour   Intake 1080.34 ml   Output --   Net 1080.34 ml          Significant Labs: All pertinent labs within the past 24 hours have been reviewed.  CBC:   Recent Labs   Lab 09/27/22 2001 09/28/22  0346   WBC 11.69 9.07   HGB 13.9* 13.1*   HCT 42.1 39.5*    196     CMP:   Recent Labs   Lab 09/27/22 2001 09/28/22  0346   * 138   K 5.0 4.6    105   CO2 25 28   * 207*   BUN 14 15   CREATININE 1.0 1.1   CALCIUM 8.2* 8.3*   PROT 5.9* 5.9*   ALBUMIN 3.3* 3.1*   BILITOT 0.5 0.4   ALKPHOS 86 79   AST 17 12   ALT 19 16   ANIONGAP 8 5*     Troponin:   Recent Labs   Lab 09/27/22 2001 09/28/22  0346 09/28/22  0909   TROPONINI <0.006 <0.006 <0.006       Significant Imaging:            Assessment/Plan:      * Bilateral carotid artery stenosis  S/p Left endarterectomy 9/27/22 per Dr. Fletcher  Pain control  Vascular surgery following     9/28  Left neck incision intact   Vascular Surgery managing         Gallbladder  mass  1.9 cm nonspecific hyperdensity within the gallbladder which appears to demonstrate arterial enhancement, concerning for polyp or neoplasm.  Further evaluation with gallbladder ultrasound recommended  Seen on recent CTA with runoff  F/U with general surgery    Renal mass, right  1.6 cm inferior right renal lesion concerning for enhancing renal mass seen on recent CTA runoff  F/U with Urology    Coronary artery disease of native artery of native heart with stable angina pectoris  CAD s/p LHC showed distal LCX  2/2022  [place] CP/SOB   Cont ASA, BB, Statin, ARB        Neuropathy  Cont gabapentin       Bradycardia  Hx bradycardia   EKG showed Sinus bradycardia with 1st degree A-V block with nonspecific T wave changes   Check serial troponin   EF was calculated to be 60% on [facility] 2/2022   Holding parameters on BB  Monitor       Type 2 diabetes mellitus with foot ulcer, without long-term current use of insulin  Patient's FSGs are controlled on current medication regimen.  Last A1c reviewed-   Lab Results   Component Value Date    HGBA1C 10.1 (H) 09/27/2022     Most recent fingerstick glucose reviewed-   Recent Labs   Lab 09/27/22 2022 09/28/22  0535 09/28/22  1202   POCTGLUCOSE 213* 210* 228*     Current correctional scale  Low  Maintain anti-hyperglycemic dose as follows-   Antihyperglycemics (From admission, onward)    Start     Stop Route Frequency Ordered    09/27/22 1544  insulin aspart U-100 pen 0-5 Units         -- SubQ Before meals & nightly PRN 09/27/22 1444        Hold Oral hypoglycemics while patient is in the hospital.    Primary hypertension  Bp stable   Cont Toprol. Amlodipine, and Losartan with parameters       Aneurysm of left iliac artery  AAA left iliac aneurysm 5.0cm  Followed by Dr. Simons      VTE Risk Mitigation (From admission, onward)         Ordered     enoxaparin injection 40 mg  Daily         09/27/22 1427     IP VTE HIGH RISK PATIENT  Once         09/27/22 1427     Place sequential  compression device  Until discontinued         09/27/22 1427                Discharge Planning   GLADIS: 9/28/2022     Code Status: Prior   Is the patient medically ready for discharge?:     Reason for patient still in hospital (select all that apply): Patient trending condition and Treatment  Discharge Plan A: Home   Discharge Delays: None known at this time              Mauricio Kingston NP  Department of Hospital Medicine   '[facility]

## 2022-09-28 NOTE — ASSESSMENT & PLAN NOTE
Patient's FSGs are controlled on current medication regimen.  Last A1c reviewed-   Lab Results   Component Value Date    HGBA1C 10.1 (H) 09/27/2022     Most recent fingerstick glucose reviewed-   Recent Labs   Lab 09/27/22 2022 09/28/22  0535 09/28/22  1202   POCTGLUCOSE 213* 210* 228*     Current correctional scale  Low  Maintain anti-hyperglycemic dose as follows-   Antihyperglycemics (From admission, onward)    Start     Stop Route Frequency Ordered    09/27/22 1544  insulin aspart U-100 pen 0-5 Units         -- SubQ Before meals & nightly PRN 09/27/22 1444        Hold Oral hypoglycemics while patient is in the hospital.

## 2022-09-29 ENCOUNTER — PATIENT OUTREACH (OUTPATIENT)
Dept: ADMINISTRATIVE | Facility: CLINIC | Age: 67
End: 2022-09-29
Payer: MEDICARE

## 2022-09-29 NOTE — PROGRESS NOTES
C3 nurse attempted to contact Lamine Fong for a TCC post hospital discharge follow up call. No answer. Left voicemail with callback information. The patient has a scheduled HOSFU appointment with Carlitos Michelle MD on 10/3/22 @ 5853.

## 2022-09-29 NOTE — PROGRESS NOTES
C3 nurse spoke with Lamine Fong's son for a TCC post hospital discharge follow up call. The patient has a scheduled HOSFU appointment with Carlitos Michelle MD on 10/3/22 @ 7128.

## 2022-09-29 NOTE — PROGRESS NOTES
C3 Nurse made second attempt to reach patient for TCC call. Left voicemail asking patient to please call [phone] and leave first name, last name, and , and Trinity will return call.

## 2022-09-30 LAB
FINAL PATHOLOGIC DIAGNOSIS: NORMAL
GROSS: NORMAL
Lab: NORMAL

## 2022-10-03 ENCOUNTER — OFFICE VISIT (OUTPATIENT)
Dept: INTERNAL MEDICINE | Facility: CLINIC | Age: 67
End: 2022-10-03
Payer: MEDICARE

## 2022-10-03 DIAGNOSIS — I25.118 CORONARY ARTERY DISEASE OF NATIVE ARTERY OF NATIVE HEART WITH STABLE ANGINA PECTORIS: ICD-10-CM

## 2022-10-03 DIAGNOSIS — I73.9 PAD (PERIPHERAL ARTERY DISEASE): ICD-10-CM

## 2022-10-03 DIAGNOSIS — I65.23 BILATERAL CAROTID ARTERY STENOSIS: ICD-10-CM

## 2022-10-03 DIAGNOSIS — E11.65 TYPE 2 DIABETES MELLITUS WITH HYPERGLYCEMIA, WITHOUT LONG-TERM CURRENT USE OF INSULIN: ICD-10-CM

## 2022-10-03 DIAGNOSIS — Z09 HOSPITAL DISCHARGE FOLLOW-UP: Primary | ICD-10-CM

## 2022-10-03 PROCEDURE — 99496 TRANSJ CARE MGMT HIGH F2F 7D: CPT | Mod: 95,,, | Performed by: FAMILY MEDICINE

## 2022-10-03 PROCEDURE — 99496 TRANSITIONAL CARE MANAGE SERVICE 7 DAY DISCHARGE: ICD-10-PCS | Mod: 95,,, | Performed by: FAMILY MEDICINE

## 2022-10-03 RX ORDER — GLIMEPIRIDE 1 MG/1
1 TABLET ORAL DAILY
COMMUNITY
Start: 2022-09-25 | End: 2022-10-19

## 2022-10-03 RX ORDER — SEMAGLUTIDE 1.34 MG/ML
0.5 INJECTION, SOLUTION SUBCUTANEOUS
Qty: 1 PEN | Refills: 11 | Status: SHIPPED | OUTPATIENT
Start: 2022-10-03 | End: 2022-10-19 | Stop reason: SDUPTHER

## 2022-10-03 NOTE — PROGRESS NOTES
Subjective:   Patient ID: Lamine Fong is a 67 y.o. male.  Chief Complaint:  No chief complaint on file.    The patient location is: Home  The chief complaint leading to consultation is:  Transitional care visit and follow-up on diabetes    Visit type: audiovisual    Face to Face time with patient: 20 minute  30 minutes of total time spent on the encounter, which includes face to face time and non-face to face time preparing to see the patient (eg, review of tests), Obtaining and/or reviewing separately obtained history, Documenting clinical information in the electronic or other health record, Independently interpreting results (not separately reported) and communicating results to the patient/family/caregiver, or Care coordination (not separately reported).     Each patient to whom he or she provides medical services by telemedicine is:  (1) informed of the relationship between the physician and patient and the respective role of any other health care provider with respect to management of the patient; and (2) notified that he or she may decline to receive medical services by telemedicine and may withdraw from such care at any time.    Recent admission discharge from the hospital after going uncomplicated carotid endarterectomy  Doing well postop without any significant pain or other complications  Prior to procedure, had noticed his blood glucose is to be steadily increasing with majority of readings greater than 300 despite compliance with metformin 1000 mg twice a day  Went to urgent care, and added Amaryl 1 mg daily which brought his sugars down to 200 and 250 range  Denies any significant hypoglycemic episodes  A1c done during hospital stay was 10.1% she    Other than concerns about his poorly controlled glucose, no additional complaints today      Diabetes  He presents for his follow-up diabetic visit. He has type 2 diabetes mellitus. The initial diagnosis of diabetes was made 10 Years ago. Pertinent  negatives for hypoglycemia include no confusion, dizziness, headaches, hunger, mood changes, nervousness/anxiousness, pallor, seizures, sleepiness, speech difficulty, sweats or tremors. Associated symptoms include foot paresthesias. Pertinent negatives for diabetes include no blurred vision, no chest pain, no fatigue, no foot ulcerations, no polydipsia, no polyphagia, no polyuria, no visual change, no weakness and no weight loss. Hypoglycemia complications include nocturnal hypoglycemia. Pertinent negatives for hypoglycemia complications include no blackouts, no hospitalization, no required assistance and no required glucagon injection. Symptoms are worsening. Pertinent negatives for diabetic complications include no autonomic neuropathy, CVA, heart disease, impotence, nephropathy, peripheral neuropathy, PVD or retinopathy. Risk factors for coronary artery disease include diabetes mellitus, dyslipidemia, male sex, hypertension, sedentary lifestyle, stress and obesity. Current diabetic treatment includes oral agent (monotherapy). He is compliant with treatment all of the time. He is currently taking insulin pre-breakfast. Insulin injections are given by patient. His weight is stable. He has not had a previous visit with a dietitian. He monitors blood glucose at home 3-4 x per day. Blood glucose monitoring compliance is good. His home blood glucose trend is increasing steadily. He sees a podiatrist.Eye exam is current.     Review of Systems   Constitutional:  Negative for chills, fatigue, fever and weight loss.   Eyes:  Negative for blurred vision and visual disturbance.   Respiratory:  Negative for cough, chest tightness, shortness of breath and wheezing.    Cardiovascular:  Negative for chest pain, palpitations and leg swelling.   Gastrointestinal:  Negative for abdominal pain, constipation, diarrhea, nausea and vomiting.   Endocrine: Negative for polydipsia, polyphagia and polyuria.   Genitourinary:  Negative for  impotence.   Musculoskeletal:  Negative for myalgias.   Skin:  Negative for pallor and rash.   Neurological:  Negative for dizziness, tremors, seizures, syncope, speech difficulty, weakness, numbness and headaches.   Psychiatric/Behavioral:  Negative for confusion. The patient is not nervous/anxious.      Objective:   Physical Exam  Constitutional:       Appearance: Normal appearance. He is well-developed.   Neurological:      Mental Status: He is alert.   Psychiatric:         Attention and Perception: Attention normal.         Mood and Affect: Mood and affect normal.       Assessment:       ICD-10-CM ICD-9-CM   1. Type 2 diabetes mellitus with hyperglycemia, without long-term current use of insulin  E11.65 250.00     790.29   2. Bilateral carotid artery stenosis  I65.23 433.10     433.30   3. Coronary artery disease of native artery of native heart with stable angina pectoris  I25.118 414.01     413.9   4. PAD (peripheral artery disease)  I73.9 443.9     Plan:   Hospital discharge follow-up  Transitional Care Note  Family and/or Caretaker present at visit?  Yes.  Diagnostic tests reviewed/disposition: I have reviewed all completed as well as pending diagnostic tests at the time of discharge.  Disease/illness education:  GLP 1 diabetes medications  Home health/community services discussion/referrals: Patient does not have home health established from hospital visit.  They do not need home health.  If needed, we will set up home health for the patient.   Establishment or re-establishment of referral orders for community resources: No other necessary community resources.   Discussion with other health care providers: No discussion with other health care providers necessary.      Type 2 diabetes mellitus with hyperglycemia, without long-term current use of insulin  Bilateral carotid artery stenosis  Coronary artery disease of native artery of native heart with stable angina pectoris  PAD (peripheral artery disease)  -      semaglutide (OZEMPIC) 0.25 mg or 0.5 mg(2 mg/1.5 mL) pen injector; Inject 0.5 mg into the skin every 7 days.  Dispense: 1 pen; Refill: 11  Diabetes mellitus uncontrolled with A1c 10%   Continue metformin 1000 mg twice a day with meals  In light of other comorbid conditions, recommend treatment with GLP1   Trial of Ozempic 0.25 mg weekly injection covered by insurance   Until obtains Ozempic, continue Amaryl 1 mg twice a day with meals  Recheck A1c 3 months   Follow-up visit and reassess glucose control in 6 weeks   Telemedicine appropriate if patient prefers

## 2022-10-19 ENCOUNTER — PATIENT MESSAGE (OUTPATIENT)
Dept: INTERNAL MEDICINE | Facility: CLINIC | Age: 67
End: 2022-10-19
Payer: MEDICARE

## 2022-10-19 DIAGNOSIS — E11.65 TYPE 2 DIABETES MELLITUS WITH HYPERGLYCEMIA, WITHOUT LONG-TERM CURRENT USE OF INSULIN: ICD-10-CM

## 2022-10-19 DIAGNOSIS — I65.23 BILATERAL CAROTID ARTERY STENOSIS: ICD-10-CM

## 2022-10-19 DIAGNOSIS — I73.9 PAD (PERIPHERAL ARTERY DISEASE): ICD-10-CM

## 2022-10-19 DIAGNOSIS — I25.118 CORONARY ARTERY DISEASE OF NATIVE ARTERY OF NATIVE HEART WITH STABLE ANGINA PECTORIS: ICD-10-CM

## 2022-10-19 RX ORDER — SEMAGLUTIDE 1.34 MG/ML
0.5 INJECTION, SOLUTION SUBCUTANEOUS
Qty: 1 PEN | Refills: 11 | Status: SHIPPED | OUTPATIENT
Start: 2022-10-19 | End: 2022-12-05

## 2022-10-19 RX ORDER — GLIMEPIRIDE 2 MG/1
2 TABLET ORAL 2 TIMES DAILY WITH MEALS
Qty: 60 TABLET | Refills: 2 | Status: SHIPPED | OUTPATIENT
Start: 2022-10-19 | End: 2023-02-27 | Stop reason: SDUPTHER

## 2022-10-21 DIAGNOSIS — E11.9 TYPE 2 DIABETES MELLITUS WITHOUT COMPLICATION: ICD-10-CM

## 2022-10-26 RX ORDER — GABAPENTIN 300 MG/1
900 CAPSULE ORAL NIGHTLY
Qty: 90 CAPSULE | Refills: 11 | Status: SHIPPED | OUTPATIENT
Start: 2022-10-26 | End: 2022-10-31 | Stop reason: SDUPTHER

## 2022-10-26 NOTE — TELEPHONE ENCOUNTER
No new care gaps identified.  [facility] Embedded Care Gaps. Reference number: [id number]. 10/26/2022   2:33:00 PM CDT

## 2022-10-26 NOTE — TELEPHONE ENCOUNTER
----- Message from Liat Tapia sent at 10/26/2022 12:23 PM CDT -----  Contact: [facility]  .Type:  RX Refill Request    Who Called: oma [facility]  Refill or New Rx:refi;;  RX Name and Strength:gabapentin (NEURONTIN) 300 MG capsule  How is the patient currently taking it? (ex. 1XDay):as perscribec  Is this a 30 day or 90 day RX:30  Preferred Pharmacy with phone number:.  [facility] [place] [place] - [address] [address]  [address] [address] [address]  Phone: [phone] Fax: [phone]  Local or Mail Order:local  Ordering Provider:Dr ylnn  Would the patient rather a call back or a response via [facility]? call  Best Call Back Number:[phone]  Additional Information: oma with [facility] says patient out of medication and been waiting  a few days

## 2022-10-31 ENCOUNTER — PATIENT MESSAGE (OUTPATIENT)
Dept: INTERNAL MEDICINE | Facility: CLINIC | Age: 67
End: 2022-10-31
Payer: MEDICARE

## 2022-10-31 RX ORDER — GABAPENTIN 300 MG/1
900 CAPSULE ORAL NIGHTLY
Qty: 90 CAPSULE | Refills: 11 | Status: SHIPPED | OUTPATIENT
Start: 2022-10-31 | End: 2023-11-27

## 2022-12-05 ENCOUNTER — OFFICE VISIT (OUTPATIENT)
Dept: INTERNAL MEDICINE | Facility: CLINIC | Age: 67
End: 2022-12-05
Payer: MEDICARE

## 2022-12-05 DIAGNOSIS — I25.118 CORONARY ARTERY DISEASE OF NATIVE ARTERY OF NATIVE HEART WITH STABLE ANGINA PECTORIS: Chronic | ICD-10-CM

## 2022-12-05 DIAGNOSIS — E11.65 TYPE 2 DIABETES MELLITUS WITH HYPERGLYCEMIA, WITHOUT LONG-TERM CURRENT USE OF INSULIN: Primary | ICD-10-CM

## 2022-12-05 DIAGNOSIS — K21.9 GASTROESOPHAGEAL REFLUX DISEASE WITHOUT ESOPHAGITIS: ICD-10-CM

## 2022-12-05 PROBLEM — E78.2 MIXED HYPERLIPIDEMIA: Status: RESOLVED | Noted: 2022-06-13 | Resolved: 2022-12-05

## 2022-12-05 PROCEDURE — 99214 OFFICE O/P EST MOD 30 MIN: CPT | Mod: 95,,, | Performed by: FAMILY MEDICINE

## 2022-12-05 PROCEDURE — 99214 PR OFFICE/OUTPT VISIT, EST, LEVL IV, 30-39 MIN: ICD-10-PCS | Mod: 95,,, | Performed by: FAMILY MEDICINE

## 2022-12-05 RX ORDER — TIRZEPATIDE 5 MG/.5ML
5 INJECTION, SOLUTION SUBCUTANEOUS
Qty: 4 PEN | Refills: 11 | Status: SHIPPED | OUTPATIENT
Start: 2022-12-05 | End: 2023-01-04

## 2022-12-05 RX ORDER — PANTOPRAZOLE SODIUM 40 MG/1
40 TABLET, DELAYED RELEASE ORAL DAILY
Qty: 90 TABLET | Refills: 3 | Status: SHIPPED | OUTPATIENT
Start: 2022-12-05 | End: 2023-12-18

## 2022-12-05 NOTE — PROGRESS NOTES
Subjective:   Patient ID: Lamine Fong is a 67 y.o. male.  Chief Complaint:  Diabetes    The patient location is: Home  The chief complaint leading to consultation is:  Diabetes follow-up    Visit type: audiovisual    Face to Face time with patient: 20 minutes  30 minutes of total time spent on the encounter, which includes face to face time and non-face to face time preparing to see the patient (eg, review of tests), Obtaining and/or reviewing separately obtained history, Documenting clinical information in the electronic or other health record, Independently interpreting results (not separately reported) and communicating results to the patient/family/caregiver, or Care coordination (not separately reported).     Each patient to whom he or she provides medical services by telemedicine is:  (1) informed of the relationship between the physician and patient and the respective role of any other health care provider with respect to management of the patient; and (2) notified that he or she may decline to receive medical services by telemedicine and may withdraw from such care at any time.    Presents for follow-up on uncontrolled diabetes   Last visit recommended metformin 1000 mg twice a day max dose and try to approved Ozempic weekly injections   Unfortunately unsuccessful to his pharmacy to obtain Ozempic  Increase Amaryl to 2 mg dose in increased frequency to twice a day   Reports compliance.  Denies side effects.    Glucose improved with all readings less than 250, but majority remains greater than 200  Otherwise doing well     Does need refill of Prilosec for GERD  Previously prescribed and effective for symptom control  Would like to take as needed     Complains of intermittent right lower leg swelling   It does improve when he is off of his feet and the legs elevated   Denies any pain or skin breakdown   No other symptoms of volume overload or congestive heart failure   Does have follow-up with vascular  surgery this month    Diabetes  He presents for his follow-up diabetic visit. He has type 2 diabetes mellitus. Pertinent negatives for hypoglycemia include no confusion, dizziness, headaches, hunger, mood changes, nervousness/anxiousness, pallor, seizures, sleepiness, speech difficulty, sweats or tremors. Associated symptoms include foot paresthesias. Pertinent negatives for diabetes include no blurred vision, no chest pain, no fatigue, no foot ulcerations, no polydipsia, no polyphagia, no polyuria, no visual change, no weakness and no weight loss. Pertinent negatives for hypoglycemia complications include no blackouts, no hospitalization, no nocturnal hypoglycemia, no required assistance and no required glucagon injection. Symptoms are worsening. Pertinent negatives for diabetic complications include no autonomic neuropathy, CVA, heart disease, impotence, nephropathy, peripheral neuropathy, PVD or retinopathy. He is currently taking insulin pre-breakfast. He monitors blood glucose at home 1-2 x per day.     Review of Systems   Constitutional:  Negative for chills, fatigue, fever and weight loss.   Eyes:  Negative for blurred vision and visual disturbance.   Respiratory:  Negative for cough, chest tightness, shortness of breath and wheezing.    Cardiovascular:  Positive for leg swelling. Negative for chest pain and palpitations.   Gastrointestinal:  Negative for abdominal pain, constipation, diarrhea, nausea and vomiting.   Endocrine: Negative for polydipsia, polyphagia and polyuria.   Genitourinary:  Negative for impotence.   Musculoskeletal:  Negative for myalgias.   Skin:  Negative for pallor and rash.   Neurological:  Negative for dizziness, tremors, seizures, syncope, speech difficulty, weakness, numbness and headaches.   Psychiatric/Behavioral:  Negative for confusion. The patient is not nervous/anxious.      Objective:   Physical Exam  Constitutional:       Appearance: Normal appearance. He is well-developed.    Neurological:      Mental Status: He is alert.   Psychiatric:         Attention and Perception: Attention normal.         Mood and Affect: Mood and affect normal.       Assessment:       ICD-10-CM ICD-9-CM   1. Type 2 diabetes mellitus with hyperglycemia, without long-term current use of insulin  E11.65 250.00     790.29   2. Coronary artery disease of native artery of native heart with stable angina pectoris  I25.118 414.01     413.9   3. Gastroesophageal reflux disease without esophagitis  K21.9 530.81     Plan:   Type 2 diabetes mellitus with hyperglycemia, without long-term current use of insulin  Coronary artery disease of native artery of native heart with stable angina pectoris  -     tirzepatide (MOUNJARO) 5 mg/0.5 mL PnIj; Inject 5 mg into the skin every 7 days.  Dispense: 4 pen; Refill: 11  -     Hemoglobin A1C; Future; Expected date: 12/19/2022  Improved, but still likely with A1c greater than 8% based on current readings   Continue metformin 1000 mg twice a day, discussed 3 times a day dosing would not be any more effective and increased potential side effects and other health risk   Continue Amaryl 2 mg twice a day   Try and get  Mounjaro weekly injections approved due to A1c remains greater than 8% with  coexisting coronary artery disease and coexisting obesity   Repeat A1c 4 weeks    Gastroesophageal reflux disease without esophagitis  -     pantoprazole (PROTONIX) 40 MG tablet; Take 1 tablet (40 mg total) by mouth once daily.  Dispense: 90 tablet; Refill: 3  Discussed need to change to Protonix 40 mg daily as needed  Change due to chronic complete tall use an interaction with other PPI.      Follow-up with any/all specialists as scheduled    Patient expresses agreement and understanding to the above plan

## 2022-12-14 ENCOUNTER — OFFICE VISIT (OUTPATIENT)
Dept: VASCULAR SURGERY | Facility: CLINIC | Age: 67
End: 2022-12-14
Payer: MEDICARE

## 2022-12-14 VITALS — BODY MASS INDEX: 31.99 KG/M2 | WEIGHT: 262.81 LBS

## 2022-12-14 DIAGNOSIS — I70.218 CLAUDICATION OF BOTH UPPER EXTREMITIES DUE TO ATHEROSCLEROSIS: Primary | ICD-10-CM

## 2022-12-14 PROCEDURE — 99214 PR OFFICE/OUTPT VISIT, EST, LEVL IV, 30-39 MIN: ICD-10-PCS | Mod: S$PBB,,, | Performed by: SURGERY

## 2022-12-14 PROCEDURE — 99999 PR PBB SHADOW E&M-EST. PATIENT-LVL III: ICD-10-PCS | Mod: PBBFAC,,, | Performed by: SURGERY

## 2022-12-14 PROCEDURE — 99214 OFFICE O/P EST MOD 30 MIN: CPT | Mod: S$PBB,,, | Performed by: SURGERY

## 2022-12-14 PROCEDURE — 99213 OFFICE O/P EST LOW 20 MIN: CPT | Mod: PBBFAC,PN | Performed by: SURGERY

## 2022-12-14 PROCEDURE — 99999 PR PBB SHADOW E&M-EST. PATIENT-LVL III: CPT | Mod: PBBFAC,,, | Performed by: SURGERY

## 2022-12-14 RX ORDER — DIPHENHYDRAMINE HCL 25 MG
50 CAPSULE ORAL ONCE
Status: SHIPPED | OUTPATIENT
Start: 2022-12-14

## 2022-12-14 RX ORDER — SODIUM CHLORIDE 9 MG/ML
INJECTION, SOLUTION INTRAVENOUS CONTINUOUS
Status: SHIPPED | OUTPATIENT
Start: 2022-12-14 | End: 2022-12-14

## 2022-12-14 RX ORDER — SODIUM CHLORIDE 0.9 % (FLUSH) 0.9 %
10 SYRINGE (ML) INJECTION
Status: SHIPPED | OUTPATIENT
Start: 2022-12-14

## 2022-12-14 NOTE — PROGRESS NOTES
[facility]  History & Physical  Vascular Surgery    SUBJECTIVE:     Chief Complaint/Reason for Admission:  Right lower extremity lifestyle limiting claudication    History of Present Illness:  Patient is a 67 y.o. male presents with right lower extremity lifestyle limiting claudication.  Here today for right lower extremity angiogram.  Of note patient also has a juxtarenal abdominal aortic aneurysm which will require surgical intervention.  Patient has bilateral accessory renal arteries.  Will also proceed with selective renal artery angiogram to determine if embolization of these accessory renal arteries is possible for future endovascular repair of his aneurysm    (Not in a hospital admission)      Review of patient's allergies indicates:  No Known Allergies    Past Medical History:   Diagnosis Date    Alcohol abuse     Quit 1/2022    Anticoagulant long-term use     Aortic aneurysm     Artery occlusion     Carotid artery occlusion     COPD (chronic obstructive pulmonary disease)     no home O2    Coronary artery disease     Depression     Diabetes mellitus     Hypertension     Hypokalemia     Hypomagnesemia     SHANELLE (obstructive sleep apnea)     without cpap     Past Surgical History:   Procedure Laterality Date    CAROTID ENDARTERECTOMY Left 9/27/2022    Procedure: ENDARTERECTOMY-CAROTID;  Surgeon: Bharat Fletcher IV, MD;  Location: [facility] OR;  Service: Cardiovascular;  Laterality: Left;    HERNIA REPAIR      umbilical    LEFT HEART CATHETERIZATION Left 2/28/2022    Procedure: CATHETERIZATION, HEART, LEFT;  Surgeon: Jadiel Simons MD;  Location: [facility] CATH LAB;  Service: Cardiology;  Laterality: Left;     Family History   Problem Relation Age of Onset    Diabetes Mellitus Father      Social History     Tobacco Use    Smoking status: Former     Packs/day: 1.50     Years: 44.00     Pack years: 66.00     Types: Vaping with nicotine, Cigars, Cigarettes     Start date: 1/1/1968     Quit date: 12/6/2021      Years since quittin.0    Smokeless tobacco: Current   Substance Use Topics    Alcohol use: Not Currently     Comment: quit 2022    Drug use: No        Review of Systems:  Peripheral Vascular: Leg Claudication: right: Location:  Catheterization, Frequency:  10 yd  Constitutional: no fever or chills  Respiratory: no cough or shortness of breath  Cardiovascular: no chest pain or palpitations  Gastrointestinal: no nausea or vomiting, tolerating diet  Neurological: no seizures or tremors    OBJECTIVE:     Vital Signs (Most Recent):       Physical Exam:  General: no distress  Lungs:  clear to auscultation bilaterally and normal respiratory effort  Heart: regular rate and rhythm, S1, S2 normal, no murmur, rub or gallop  Abdomen: soft, non-tender non-distented; bowel sounds normal; no masses,  no organomegaly  Extremities: no cyanosis or edema, or clubbing  Pulses: FEM: present 2+ and DP: doppler  Neurologic:  No focal numbness or weakness    Laboratory:  I have reviewed all pertinent lab results within the past 24 hours.    Diagnostic Results:  Vascular Lab Results: Reviewed    ASSESSMENT/PLAN:     Will proceed with right lower extremity angiogram with possible intervention.  We will also perform bilateral renal artery angiogram to determine if embolization of these renal arteries as possible before endovascular repair of his juxtarenal abdominal aortic aneurysm

## 2022-12-19 ENCOUNTER — LAB VISIT (OUTPATIENT)
Dept: LAB | Facility: HOSPITAL | Age: 67
End: 2022-12-19
Attending: FAMILY MEDICINE
Payer: MEDICARE

## 2022-12-19 DIAGNOSIS — E11.65 TYPE 2 DIABETES MELLITUS WITH HYPERGLYCEMIA, WITHOUT LONG-TERM CURRENT USE OF INSULIN: ICD-10-CM

## 2022-12-19 LAB
ESTIMATED AVG GLUCOSE: 177 MG/DL (ref 68–131)
HBA1C MFR BLD: 7.8 % (ref 4–5.6)

## 2022-12-19 PROCEDURE — 36415 COLL VENOUS BLD VENIPUNCTURE: CPT | Performed by: FAMILY MEDICINE

## 2022-12-19 PROCEDURE — 83036 HEMOGLOBIN GLYCOSYLATED A1C: CPT | Performed by: FAMILY MEDICINE

## 2023-01-03 ENCOUNTER — PATIENT MESSAGE (OUTPATIENT)
Dept: INTERNAL MEDICINE | Facility: CLINIC | Age: 68
End: 2023-01-03
Payer: MEDICARE

## 2023-01-03 ENCOUNTER — PATIENT MESSAGE (OUTPATIENT)
Dept: VASCULAR SURGERY | Facility: CLINIC | Age: 68
End: 2023-01-03
Payer: MEDICARE

## 2023-01-04 DIAGNOSIS — E11.65 TYPE 2 DIABETES MELLITUS WITH HYPERGLYCEMIA, WITHOUT LONG-TERM CURRENT USE OF INSULIN: Primary | ICD-10-CM

## 2023-01-04 DIAGNOSIS — I25.118 CORONARY ARTERY DISEASE OF NATIVE ARTERY OF NATIVE HEART WITH STABLE ANGINA PECTORIS: ICD-10-CM

## 2023-01-04 DIAGNOSIS — E66.9 OBESITY (BMI 30.0-34.9): ICD-10-CM

## 2023-01-04 PROBLEM — E66.811 OBESITY (BMI 30.0-34.9): Status: ACTIVE | Noted: 2023-01-04

## 2023-01-04 RX ORDER — ORAL SEMAGLUTIDE 3 MG/1
3 TABLET ORAL DAILY
Qty: 30 TABLET | Refills: 0 | Status: SHIPPED | OUTPATIENT
Start: 2023-01-04 | End: 2023-02-27 | Stop reason: SDUPTHER

## 2023-01-23 ENCOUNTER — HOSPITAL ENCOUNTER (OUTPATIENT)
Facility: HOSPITAL | Age: 68
Discharge: HOME | End: 2023-01-23
Attending: SURGERY | Admitting: SURGERY
Payer: MEDICARE

## 2023-01-23 DIAGNOSIS — I70.218 CLAUDICATION OF BOTH UPPER EXTREMITIES DUE TO ATHEROSCLEROSIS: Primary | ICD-10-CM

## 2023-01-23 LAB
ANION GAP SERPL CALC-SCNC: 9 MMOL/L (ref 8–16)
BASOPHILS # BLD AUTO: 0.08 K/UL (ref 0–0.2)
BASOPHILS NFR BLD: 0.9 % (ref 0–1.9)
BUN SERPL-MCNC: 17 MG/DL (ref 8–23)
CALCIUM SERPL-MCNC: 9.5 MG/DL (ref 8.7–10.5)
CHLORIDE SERPL-SCNC: 100 MMOL/L (ref 95–110)
CO2 SERPL-SCNC: 23 MMOL/L (ref 23–29)
CREAT SERPL-MCNC: 1.2 MG/DL (ref 0.5–1.4)
DIFFERENTIAL METHOD: ABNORMAL
EOSINOPHIL # BLD AUTO: 0.3 K/UL (ref 0–0.5)
EOSINOPHIL NFR BLD: 3.5 % (ref 0–8)
ERYTHROCYTE [DISTWIDTH] IN BLOOD BY AUTOMATED COUNT: 12.7 % (ref 11.5–14.5)
EST. GFR  (NO RACE VARIABLE): >60 ML/MIN/1.73 M^2
GLUCOSE SERPL-MCNC: 302 MG/DL (ref 70–110)
HCT VFR BLD AUTO: 45.7 % (ref 40–54)
HGB BLD-MCNC: 15.3 G/DL (ref 14–18)
IMM GRANULOCYTES # BLD AUTO: 0.03 K/UL (ref 0–0.04)
IMM GRANULOCYTES NFR BLD AUTO: 0.3 % (ref 0–0.5)
INR PPP: 1 (ref 0.8–1.2)
LYMPHOCYTES # BLD AUTO: 2.1 K/UL (ref 1–4.8)
LYMPHOCYTES NFR BLD: 23.5 % (ref 18–48)
MCH RBC QN AUTO: 27 PG (ref 27–31)
MCHC RBC AUTO-ENTMCNC: 33.5 G/DL (ref 32–36)
MCV RBC AUTO: 81 FL (ref 82–98)
MONOCYTES # BLD AUTO: 0.7 K/UL (ref 0.3–1)
MONOCYTES NFR BLD: 7.5 % (ref 4–15)
NEUTROPHILS # BLD AUTO: 5.7 K/UL (ref 1.8–7.7)
NEUTROPHILS NFR BLD: 64.3 % (ref 38–73)
NRBC BLD-RTO: 0 /100 WBC
PLATELET # BLD AUTO: 235 K/UL (ref 150–450)
PMV BLD AUTO: 9.2 FL (ref 9.2–12.9)
POTASSIUM SERPL-SCNC: 4.4 MMOL/L (ref 3.5–5.1)
PROTHROMBIN TIME: 10.3 SEC (ref 9–12.5)
RBC # BLD AUTO: 5.67 M/UL (ref 4.6–6.2)
SODIUM SERPL-SCNC: 132 MMOL/L (ref 136–145)
WBC # BLD AUTO: 8.93 K/UL (ref 3.9–12.7)

## 2023-01-23 PROCEDURE — 85610 PROTHROMBIN TIME: CPT | Performed by: SURGERY

## 2023-01-23 PROCEDURE — 99152 MOD SED SAME PHYS/QHP 5/>YRS: CPT | Performed by: SURGERY

## 2023-01-23 PROCEDURE — 25500020 PHARM REV CODE 255: Performed by: SURGERY

## 2023-01-23 PROCEDURE — 25000003 PHARM REV CODE 250: Performed by: SURGERY

## 2023-01-23 PROCEDURE — 63600175 PHARM REV CODE 636 W HCPCS: Performed by: SURGERY

## 2023-01-23 PROCEDURE — C1887 CATHETER, GUIDING: HCPCS | Performed by: SURGERY

## 2023-01-23 PROCEDURE — 75716 ARTERY X-RAYS ARMS/LEGS: CPT | Performed by: SURGERY

## 2023-01-23 PROCEDURE — C1769 GUIDE WIRE: HCPCS | Performed by: SURGERY

## 2023-01-23 PROCEDURE — 75625 CONTRAST EXAM ABDOMINL AORTA: CPT | Performed by: SURGERY

## 2023-01-23 PROCEDURE — C1730 CATH, EP, 19 OR FEW ELECT: HCPCS | Performed by: SURGERY

## 2023-01-23 PROCEDURE — 36200 PLACE CATHETER IN AORTA: CPT | Performed by: SURGERY

## 2023-01-23 PROCEDURE — 80048 BASIC METABOLIC PNL TOTAL CA: CPT | Performed by: SURGERY

## 2023-01-23 PROCEDURE — C1894 INTRO/SHEATH, NON-LASER: HCPCS | Performed by: SURGERY

## 2023-01-23 PROCEDURE — 85025 COMPLETE CBC W/AUTO DIFF WBC: CPT | Performed by: SURGERY

## 2023-01-23 PROCEDURE — 99153 MOD SED SAME PHYS/QHP EA: CPT | Performed by: SURGERY

## 2023-01-23 RX ORDER — SODIUM CHLORIDE 9 MG/ML
INJECTION, SOLUTION INTRAVENOUS CONTINUOUS
Status: ACTIVE | OUTPATIENT
Start: 2023-01-23 | End: 2023-01-23

## 2023-01-23 RX ORDER — DIPHENHYDRAMINE HYDROCHLORIDE 50 MG/ML
INJECTION INTRAMUSCULAR; INTRAVENOUS
Status: DISCONTINUED | OUTPATIENT
Start: 2023-01-23 | End: 2023-01-23 | Stop reason: HOSPADM

## 2023-01-23 RX ORDER — FENTANYL CITRATE 50 UG/ML
INJECTION, SOLUTION INTRAMUSCULAR; INTRAVENOUS
Status: DISCONTINUED | OUTPATIENT
Start: 2023-01-23 | End: 2023-01-23 | Stop reason: HOSPADM

## 2023-01-23 RX ORDER — MIDAZOLAM HYDROCHLORIDE 1 MG/ML
INJECTION, SOLUTION INTRAMUSCULAR; INTRAVENOUS
Status: DISCONTINUED | OUTPATIENT
Start: 2023-01-23 | End: 2023-01-23 | Stop reason: HOSPADM

## 2023-01-23 RX ORDER — LIDOCAINE HYDROCHLORIDE 20 MG/ML
INJECTION, SOLUTION EPIDURAL; INFILTRATION; INTRACAUDAL; PERINEURAL
Status: DISCONTINUED | OUTPATIENT
Start: 2023-01-23 | End: 2023-01-23 | Stop reason: HOSPADM

## 2023-01-23 RX ORDER — SODIUM CHLORIDE 9 MG/ML
INJECTION, SOLUTION INTRAVENOUS CONTINUOUS
Status: DISCONTINUED | OUTPATIENT
Start: 2023-01-23 | End: 2023-01-23

## 2023-01-23 RX ORDER — IODIXANOL 320 MG/ML
INJECTION, SOLUTION INTRAVASCULAR
Status: DISCONTINUED | OUTPATIENT
Start: 2023-01-23 | End: 2023-01-23 | Stop reason: HOSPADM

## 2023-01-23 RX ADMIN — SODIUM CHLORIDE: 9 INJECTION, SOLUTION INTRAVENOUS at 01:01

## 2023-01-23 NOTE — DISCHARGE SUMMARY
[facility] Lab ([facility])  Vascular Surgery  Discharge Summary      Patient Name: Lamine Fong  MRN: [id number]  Admission Date: 1/23/2023  Hospital Length of Stay: 0 days  Discharge Date and Time:  01/23/2023 12:43 PM  Attending Physician: Bharat Fletcher IV, MD   Discharging Provider: Bharat Fletcher IV, MD  Primary Care Provider: Carlitos Michelle MD     HPI:  67-year-old male with a medical history significant for juxtarenal abdominal aortic aneurysm as well as right-greater-than-left lower extremity lifestyle limiting claudication    Procedure(s) (LRB):  Angiogram Extremity Unilateral/ right leg (Right)     Hospital Course:  Patient underwent abdominal aortogram with right lower extremity angiogram.  This showed bilateral accessory renal arteries.  Appear amenable to embolization prior to endovascular abdominal aortic aneurysm repair.  Right lower extremity runoff shows severely calcified and narrowed right common femoral artery with chronically occluded right superficial femoral above knee popliteal artery.  Postoperative course uncomplicated.    Patient would benefit from a right common femoral endarterectomy with femoral to below-knee popliteal artery bypass    Consults:     Significant Diagnostic Studies: Angiography:  See operative report    Pending Diagnostic Studies:       None          Final Active Diagnoses:    Diagnosis Date Noted POA    PRINCIPAL PROBLEM:  Claudication of both upper extremities due to atherosclerosis [I70.218] 01/23/2023 Yes      Problems Resolved During this Admission:      Discharged Condition: good    Disposition: Home or Self Care    Follow Up:   Follow-up Information       Bharat Fletcher IV, MD Follow up in 2 week(s).    Specialty: Vascular Surgery  Contact information:  [address]  3RD FLOOR  [facility]  [facility] [address] [phone]                             Patient Instructions:      Diet general     Call MD for:  temperature >100.4     Call  MD for:  persistent nausea and vomiting     Call MD for:  difficulty breathing, headache or visual disturbances     Call MD for:  severe uncontrolled pain     Call MD for:  redness, tenderness, or signs of infection (pain, swelling, redness, odor or green/yellow discharge around incision site)     Remove dressing in 24 hours     Activity as tolerated     Medications:  Reconciled Home Medications:      Medication List        CONTINUE taking these medications      albuterol 90 mcg/actuation inhaler  Commonly known as: PROVENTIL/VENTOLIN HFA  Inhale 2 puffs into the lungs every 4 (four) hours as needed for Wheezing or Shortness of Breath.     amLODIPine 10 MG tablet  Commonly known as: NORVASC  Take 1 tablet (10 mg total) by mouth once daily.     aspirin 81 MG EC tablet  Commonly known as: ECOTRIN  Take 1 tablet (81 mg total) by mouth once daily.     BLOOD PRESSURE CUFF Misc  Generic drug: miscellaneous medical supply  1 each by Misc.(Non-Drug; Combo Route) route 2 (two) times a day.     blood sugar diagnostic Strp  To check BG 2 times daily, to use with insurance preferred meter     blood-glucose meter kit  To check BG 2 times daily, to use with insurance preferred meter     cilostazoL 100 MG Tab  Commonly known as: PLETAL  Take 1 tablet (100 mg total) by mouth 2 (two) times daily.     citalopram 20 MG tablet  Commonly known as: CeleXA  Take 1 tablet (20 mg total) by mouth once daily.     folic acid 1 MG tablet  Commonly known as: FOLVITE  Take 1 tablet (1 mg total) by mouth once daily.     gabapentin 300 MG capsule  Commonly known as: NEURONTIN  Take 3 capsules (900 mg total) by mouth every evening.     glimepiride 2 MG tablet  Commonly known as: AMARYL  Take 1 tablet (2 mg total) by mouth 2 (two) times daily with meals.     lancets Misc  To check BG 2 times daily, to use with insurance preferred meter     losartan 100 MG tablet  Commonly known as: COZAAR  Take 1 tablet (100 mg total) by mouth once daily.      metFORMIN 1000 MG tablet  Commonly known as: GLUCOPHAGE  Take 1 tablet (1,000 mg total) by mouth 2 (two) times daily with meals.     metoprolol succinate 25 MG 24 hr tablet  Commonly known as: TOPROL-XL  Take 1 tablet (25 mg total) by mouth once daily.     pantoprazole 40 MG tablet  Commonly known as: PROTONIX  Take 1 tablet (40 mg total) by mouth once daily.     RYBELSUS 3 mg tablet  Generic drug: semaglutide  Take 1 tablet (3 mg total) by mouth once daily.     simvastatin 20 MG tablet  Commonly known as: ZOCOR  Take 1 tablet (20 mg total) by mouth every evening.     TylenoL 325 MG tablet  Generic drug: acetaminophen  Take 650 mg by mouth every 6 (six) hours as needed.     VITAMIN B-1 (MONONITRATE) 100 mg Tab  Generic drug: thiamine mononitrate (vit B1)  Take 100 mg by mouth every morning.     VITAMIN B-12 1000 MCG tablet  Generic drug: cyanocobalamin  Take 1,000 mcg by mouth every 7 days.              Bharat Fletcher IV, MD  Vascular Surgery  [facility] - Cath Lab ([facility])

## 2023-01-23 NOTE — Clinical Note
Chief Complaint    Pt c/o runny nose and sneezing was advised to get Covid tested. Pt was exposed from her daughter 12/26.  History of Present Illness      Today's visit was conducted via telemedicine, telephone, from clinic to patient in [place] due to the COVID-19 pandemic.       Patient consent, as follows, for telemedicine visit was obtained.   You have been scheduled for a telemedicine visit, which is a billable service.  This is a replacement for a face-to-face visit that is being recommended at this time to help keep our patient safe.  If, during our visit , we decide that you need a face-to-face visit, this visit will be canceled and you will be rescheduled to come into the clinic for a face to face appointment.  Can we proceed with a telemedicine visit?  1502 - 1503      HPI      12/26/2020 developed sneeze, cough, runny nose, loose stools, no loss of smell, continues to have a runny nose, otherwise feeling ok      ROS 10 point ROS is neg except as per HPI      Discussed:      Contact clinic if sx worsen or do not improve.       Also discussed methods to reduce risks of Covid-19 including social isolation and avoid touching face and frequent, adequate hand washing.  If you develop upper respiratory symptoms then call the clinic or the ED to discuss whether or not you should come in for further evaluation and treatment.  Assessment/Plan       Exposure to COVID-19 virus (Z20.822)         Ordered:          80625 physician telephone evaluation 5-10 min (Charge), Quantity: 1, Exposure to COVID-19 virus  Runny nose                Runny nose (R09.89)         Ordered:          45343 physician telephone evaluation 5-10 min (Charge), Quantity: 1, Exposure to COVID-19 virus  Runny nose               pt is trying to arrange transportation, will have her get tested today or tomorrow curbside for covid 19      prep time 2 min      call time 5 min      charting time 5 min  Patient Information     Name:GARRICK RAMIREZ    The wire was inserted into the middle infrarenal abdominal aorta.    Address:      [address] [place] [phone]     Sex:Female     YOB: 1957     [phone]     Emergency Contact:KADE MURRELL     MRN:[id number]     FIN:[id number]     Location:[email or web address]     Date of Service:01/12/2021      Primary Care Physician:       Samanta Maza MD, [phone]      Attending Physician:       Samanta Maza MD, [phone]  Problem List/Past Medical History    Ongoing     Acute myocardial infarction, unspecified     Atherosclerotic heart disease of native coronary artery without angina pectoris     CAD in native artery     Combined forms of age-related cataract, left eye     Combined forms of age-related cataract, right eye     Essential (primary) hypertension     Generalized anxiety disorder     History of MI (myocardial infarction)     Infectious gastroenteritis and colitis, unspecified     Insomnia, unspecified     Major depressive disorder, single episode, unspecified     Myopia, bilateral     Obstructive sleep apnea (adult) (pediatric)     Polypharmacy     Pure hypercholesterolemia, unspecified     Stable angina     Stress incontinence (female) (male)     Type 2 diabetes mellitus without complications     Unspecified hearing loss, bilateral     Unspecified open wound, left foot, initial encounter     Unstable angina    Historical     Pregnancy  Procedure/Surgical History     Esophagogastroduodenoscopy (09/27/2019)            Comments: Indication: Chronic heartburn..     IOL - Cataract extraction and insertion of intraocular lens (05/21/2019)            Comments: Right..     IOL - Cataract extraction and insertion of intraocular lens (05/07/2019)            Comments: Left..     Coronary artery stent x 3 (07/2011)           Angioplasty (2011)           Tubal ligation (1979)        Medications    clopidogrel 75 mg oral tablet, 75 mg= 1 tab(s), Oral, daily, 1 refills    FLUoxetine 20 mg oral capsule, 20 mg= 1  cap(s), Oral, daily, 2 refills    Freestyle Dm 2 14 day reader, See Instructions    Freestyle Dm 2 14 day sensor, See Instructions, 3 refills    glucose 4 g oral tablet, chewable, 16 gm= 4 tab(s), Chewed, once    HumaLOG KwikPen 100 units/mL injectable solution, See Instructions, 3 refills    Incontience diapers, See Instructions, 11 refills    Jardiance 25 mg oral tablet, 25 mg= 1 tab(s), Oral, qam, 2 refills    Lachydrin, See Instructions, PRN, 1 refills    lancet bowman, See Instructions    lisinopril 40 mg oral tablet, See Instructions, 3 refills    loratadine 10 mg oral capsule, 10 mg= 1 cap(s), Oral, daily    MetFORMIN (Eqv-Glucophage XR) 500 mg oral tablet, extended release, 1000 mg= 2 tab(s), Oral, bid, 3 refills    Metoprolol Tartrate 25 mg oral tablet, See Instructions    Misc Prescription    multivitamin with minerals (w/ Iron), 1 tablet, Oral, daily    nitroglycerin 0.4 mg sublingual tablet, See Instructions, PRN, 3 refills    pantoprazole 20 mg oral delayed release tablet, 20 mg= 1 tab(s), Oral, bid, 3 refills    rosuvastatin 20 mg oral tablet, 20 mg= 1 tab(s), Oral, qhs, 3 refills    Tresiba FlexTouch 100 units/mL subcutaneous solution, See Instructions, 1 refills    true metrix glucometer test strips, See Instructions, 11 refills    Tums 500 mg oral tablet, chewable, 500 mg= 1 tab(s), Chewed, bid    ULTICARE MICRO PEN TIP 32G 4MM 50CT MG INJECTABLE, See Instructions, 3 refills  Allergies    traZODone (Nightmares)    Cats (Runny nose)    Lipitor (Diarrhea, nausea, vomiting)    Sodium Hypochlorite (Runny nose)    mirtazapine (Nightmare, Suicidal ideation)  Social History    Smoking Status     Former smoker     Alcohol      Never     Electronic Cigarette/Vaping      Electronic Cigarette Use: Never.     Exercise      Exercise frequency: 1-2 times/week. Exercise type: Walking.     Home/Environment      Marital status: Single. 1 children. Living situation: Home/Independent. Injuries/Abuse/Neglect in  household: No. Feels unsafe at home: No. Family/Friends available for support: Yes.     Nutrition/Health      Type of diet: Regular. Wants to lose weight: Yes. Sleeping concerns: Yes. Feels highly stressed: Yes.     Sexual      Sexually active: No. Identifies as female, Sexual orientation: Straight or heterosexual. History of STD: No. Contraceptive Use Details: Abstinence. History of sexual abuse: No.     Substance Abuse      Never     Tobacco - Past      Former smoker, quit more than 30 days ago, Cigarettes  Family History    Arthritis: Grandmother (M).    CVA - Cerebrovascular accident: Mother and Father.    Diabetes mellitus: Mother and Father.    Glaucoma: Sister.  Immunizations      Vaccine Date Status          influenza virus vaccine, inactivated 10/01/2020 Given          pneumococcal (PPSV23) 10/01/2020 Given          influenza virus vaccine, inactivated 12/06/2019 Given          influenza virus vaccine, inactivated 10/02/2018 Recorded          tetanus/diphth/pertuss (Tdap) adult/adol 03/23/2018 Recorded          Td 03/23/2018 Recorded          pneumococcal (PCV13) 12/16/2016 Recorded          influenza virus vaccine, inactivated 12/16/2016 Recorded          pneumococcal (PPSV23) 10/20/2015 Recorded          influenza virus vaccine, inactivated 10/20/2015 Recorded          influenza virus vaccine, inactivated 12/18/2013 Recorded          influenza virus vaccine, inactivated 09/21/2009 Recorded  Lab Results       Lab Results (Last 4 results within 90 days)        Sodium Level: 137 mmol/L [135 mmol/L - 146 mmol/L] (10/22/20 14:12:00)       Potassium Level: 3.8 mmol/L [3.5 mmol/L - 5.3 mmol/L] (10/22/20 14:12:00)       Chloride Level: 100 mmol/L [98 mmol/L - 110 mmol/L] (10/22/20 14:12:00)       CO2 Level: 24 mmol/L [20 mmol/L - 32 mmol/L] (10/22/20 14:12:00)       Glucose Level: 239 mg/dL High [65 mg/dL - 99 mg/dL] (10/22/20 14:12:00)       BUN: 19 mg/dL [7 mg/dL - 25 mg/dL] (10/22/20 14:12:00)       Creatinine  Level: 0.73 mg/dL [0.5 mg/dL - 0.99 mg/dL] (10/22/20 14:12:00)       BUN/Creat Ratio: NOT APPLICABLE [6  - 22] (10/22/20 14:12:00)       eGFR: 86 mL/min/1.73m2 (10/22/20 14:12:00)       eGFR African American: 100 mL/min/1.73m2 (10/22/20 14:12:00)       Calcium Level: 9.7 mg/dL [8.6 mg/dL - 10.4 mg/dL] (10/22/20 14:12:00)       Hgb A1c: 11.4 High (10/22/20 14:12:00)

## 2023-01-23 NOTE — Clinical Note
55 ml of contrast were injected throughout the case. 0 mL of contrast was the total wasted during the case. 55 mL was the total amount used during the case.

## 2023-01-23 NOTE — H&P
[facility]  History & Physical  Vascular Surgery     SUBJECTIVE:        Chief Complaint/Reason for Admission:  Right lower extremity lifestyle limiting claudication     History of Present Illness:  Patient is a 67 y.o. male presents with right lower extremity lifestyle limiting claudication.  Here today for right lower extremity angiogram.  Of note patient also has a juxtarenal abdominal aortic aneurysm which will require surgical intervention.  Patient has bilateral accessory renal arteries.  Will also proceed with selective renal artery angiogram to determine if embolization of these accessory renal arteries is possible for future endovascular repair of his aneurysm     (Not in a hospital admission)        Review of patient's allergies indicates:  No Known Allergies          Past Medical History:   Diagnosis Date    Alcohol abuse       Quit 1/2022    Anticoagulant long-term use      Aortic aneurysm      Artery occlusion      Carotid artery occlusion      COPD (chronic obstructive pulmonary disease)       no home O2    Coronary artery disease      Depression      Diabetes mellitus      Hypertension      Hypokalemia      Hypomagnesemia      SHANELLE (obstructive sleep apnea)       without cpap            Past Surgical History:   Procedure Laterality Date    CAROTID ENDARTERECTOMY Left 9/27/2022     Procedure: ENDARTERECTOMY-CAROTID;  Surgeon: Bharat Fletcher IV, MD;  Location: [facility] OR;  Service: Cardiovascular;  Laterality: Left;    HERNIA REPAIR         umbilical    LEFT HEART CATHETERIZATION Left 2/28/2022     Procedure: CATHETERIZATION, HEART, LEFT;  Surgeon: Jadiel Simons MD;  Location: [facility] CATH LAB;  Service: Cardiology;  Laterality: Left;            Family History   Problem Relation Age of Onset    Diabetes Mellitus Father        Social History            Tobacco Use    Smoking status: Former       Packs/day: 1.50       Years: 44.00       Pack years: 66.00       Types: Vaping with nicotine,  Cigars, Cigarettes       Start date: 1968       Quit date: 2021       Years since quittin.0    Smokeless tobacco: Current   Substance Use Topics    Alcohol use: Not Currently       Comment: quit 2022    Drug use: No         Review of Systems:  Peripheral Vascular: Leg Claudication: right: Location:  Catheterization, Frequency:  10 yd  Constitutional: no fever or chills  Respiratory: no cough or shortness of breath  Cardiovascular: no chest pain or palpitations  Gastrointestinal: no nausea or vomiting, tolerating diet  Neurological: no seizures or tremors     OBJECTIVE:      Vital Signs (Most Recent):     Physical Exam:  General: no distress  Lungs:  clear to auscultation bilaterally and normal respiratory effort  Heart: regular rate and rhythm, S1, S2 normal, no murmur, rub or gallop  Abdomen: soft, non-tender non-distented; bowel sounds normal; no masses,  no organomegaly  Extremities: no cyanosis or edema, or clubbing  Pulses: FEM: present 2+ and DP: doppler  Neurologic:  No focal numbness or weakness     Laboratory:  I have reviewed all pertinent lab results within the past 24 hours.     Diagnostic Results:  Vascular Lab Results: Reviewed     ASSESSMENT/PLAN:      Will proceed with right lower extremity angiogram with possible intervention.  We will also perform bilateral renal artery angiogram to determine if embolization of these renal arteries as possible before endovascular repair of his juxtarenal abdominal aortic aneurysm

## 2023-01-23 NOTE — PLAN OF CARE
Discharge instructions rev'd w/ pt and son. VSS. L groin WDL post ambulation, dressing CDI. IV removed. Pt w/o complaints.

## 2023-01-23 NOTE — Clinical Note
An angiography was performed of the mid infrarenal abdominal aorta  via power injection. Injection was performed with 30 mL contrast at 20 mL/s.

## 2023-01-23 NOTE — OP NOTE
[facility] - Cath Lab ([facility])  Vascular Surgery  Operative Note    SUMMARY     Date of Procedure: 1/23/2023     Procedure: Procedure(s) (LRB):  Angiogram Extremity Unilateral/ right leg (Right)     Surgeon(s) and Role:     * Bharat Fletcher IV, MD - Primary    Assisting Surgeon: None    Pre-Operative Diagnosis: Claudication of both upper extremities due to atherosclerosis [I70.218]    Post-Operative Diagnosis: Post-Op Diagnosis Codes:     * Claudication of both upper extremities due to atherosclerosis [I70.218]    Anesthesia: RN IV Sedation    Operative Findings (including complications, if any):  Severely calcified right common femoral artery.  The chronically occluded right superficial femoral and above knee popliteal artery.  Below-knee popliteal artery did reconstitute with three-vessel runoff to the right lower extremity.  Bilateral accessory renal arteries.    Patient will benefit from bilateral femoral to below-knee popliteal artery bypasses    Description of Technical Procedures:  After informed consent obtained patient brought to the cath lab placed table supine position.  Bilateral groins were prepped draped usual standard sterile fashion.  After time-out was performed under ultrasound guidance the left common femoral artery was percutaneously accessed micropuncture needle micropuncture wire was placed.  Micropuncture sheath was placed.  Stiff wires advanced into the aorta 5 Zimbabwean sheath was placed.  Diagnostic catheters placed level renals showing patent bilateral renal arteries patient also had bilateral accessory renal arteries filling minority of the renal cortex.  Abdominal aorta and iliac arteries were patent.  This point a 5 Zimbabwean glide catheter was placed up and over and a right lower extremity angiogram was performed showing severely calcified and narrowed right common femoral artery.  Profunda femoris artery was widely patent with extensive collateralization.  The right superficial femoral and  above knee popliteal artery were chronically occluded.  Below-knee popliteal artery did reconstitute with three-vessel runoff to the right foot.  At this point all wires and catheters were removed.  The left femoral 5 Albanian sheath was removed with a Vascade closure device.  Patient tolerated the procedure well.  There were no complications.    Significant Surgical Tasks Conducted by the Assistant(s), if Applicable:  None    Estimated Blood Loss (EBL): * No values recorded between 1/23/2023 12:18 PM and 1/23/2023 12:37 PM *           Implants: * No implants in log *    Specimens:   Specimen (24h ago, onward)      None                    Condition: Good    Disposition: PACU - hemodynamically stable.    Attestation: I was present and scrubbed for the entire procedure.

## 2023-01-25 ENCOUNTER — PATIENT MESSAGE (OUTPATIENT)
Dept: ADMINISTRATIVE | Facility: HOSPITAL | Age: 68
End: 2023-01-25
Payer: MEDICARE

## 2023-01-26 VITALS
WEIGHT: 265 LBS | OXYGEN SATURATION: 98 % | HEIGHT: 76 IN | SYSTOLIC BLOOD PRESSURE: 150 MMHG | TEMPERATURE: 98 F | DIASTOLIC BLOOD PRESSURE: 70 MMHG | HEART RATE: 65 BPM | BODY MASS INDEX: 32.27 KG/M2 | RESPIRATION RATE: 12 BRPM

## 2023-02-02 ENCOUNTER — PATIENT OUTREACH (OUTPATIENT)
Dept: ADMINISTRATIVE | Facility: HOSPITAL | Age: 68
End: 2023-02-02
Payer: MEDICARE

## 2023-02-02 NOTE — PROGRESS NOTES
HTN REPORT: Tried calling patient. No answer. Not able to leave a voicemail. Patient is scheduled for future appointment with PCP

## 2023-02-20 ENCOUNTER — TELEPHONE (OUTPATIENT)
Dept: CARDIOLOGY | Facility: CLINIC | Age: 68
End: 2023-02-20
Payer: MEDICARE

## 2023-02-20 NOTE — TELEPHONE ENCOUNTER
LM with Den that pt needs appt in order to be cleared    Pt has appt 3/6 - will come in AM that day- knows he will be worked in            ----- Message from Kareem Valencia MD sent at 2/18/2023 10:29 AM CST -----  Contact: Den ([facility])  Needs an office visit for the clearance   ----- Message -----  From: Renetta Hill MA  Sent: 2/17/2023   3:34 PM CST  To: Kareem Valencia MD      ----- Message -----  From: Jadiel Simons MD  Sent: 2/17/2023  12:08 PM CST  To: Renetta Hill MA    I have not seen the patient for 7 months need make sure no issues for clearance he is DR VALENCIA PATIENT MAY NEED dr Valencia to check him out   ----- Message -----  From: Renetta Hill MA  Sent: 2/17/2023  11:51 AM CST  To: Jadiel Simons MD      ----- Message -----  From: Eliana Cochran  Sent: 2/17/2023  10:51 AM CST  To: Erik Serna Staff    Den would like a call back at [phone], in regards to getting the status of the pt clearance.

## 2023-02-27 ENCOUNTER — PATIENT MESSAGE (OUTPATIENT)
Dept: INTERNAL MEDICINE | Facility: CLINIC | Age: 68
End: 2023-02-27
Payer: MEDICARE

## 2023-03-06 ENCOUNTER — OFFICE VISIT (OUTPATIENT)
Dept: CARDIOLOGY | Facility: CLINIC | Age: 68
End: 2023-03-06
Payer: MEDICARE

## 2023-03-06 VITALS
OXYGEN SATURATION: 96 % | HEART RATE: 76 BPM | SYSTOLIC BLOOD PRESSURE: 128 MMHG | BODY MASS INDEX: 31.67 KG/M2 | DIASTOLIC BLOOD PRESSURE: 66 MMHG | WEIGHT: 260.13 LBS

## 2023-03-06 DIAGNOSIS — I73.9 PAD (PERIPHERAL ARTERY DISEASE): ICD-10-CM

## 2023-03-06 DIAGNOSIS — L97.509 TYPE 2 DIABETES MELLITUS WITH FOOT ULCER, WITHOUT LONG-TERM CURRENT USE OF INSULIN: Chronic | ICD-10-CM

## 2023-03-06 DIAGNOSIS — I65.23 BILATERAL CAROTID ARTERY STENOSIS: Chronic | ICD-10-CM

## 2023-03-06 DIAGNOSIS — I15.2 HYPERTENSION ASSOCIATED WITH DIABETES: Chronic | ICD-10-CM

## 2023-03-06 DIAGNOSIS — Z01.810 PREOP CARDIOVASCULAR EXAM: Primary | ICD-10-CM

## 2023-03-06 DIAGNOSIS — E11.59 HYPERTENSION ASSOCIATED WITH DIABETES: Chronic | ICD-10-CM

## 2023-03-06 DIAGNOSIS — I71.43 INFRARENAL ABDOMINAL AORTIC ANEURYSM (AAA) WITHOUT RUPTURE: ICD-10-CM

## 2023-03-06 DIAGNOSIS — I25.118 CORONARY ARTERY DISEASE OF NATIVE ARTERY OF NATIVE HEART WITH STABLE ANGINA PECTORIS: Chronic | ICD-10-CM

## 2023-03-06 DIAGNOSIS — E11.621 TYPE 2 DIABETES MELLITUS WITH FOOT ULCER, WITHOUT LONG-TERM CURRENT USE OF INSULIN: Chronic | ICD-10-CM

## 2023-03-06 PROCEDURE — 99214 PR OFFICE/OUTPT VISIT, EST, LEVL IV, 30-39 MIN: ICD-10-PCS | Mod: S$PBB,,, | Performed by: INTERNAL MEDICINE

## 2023-03-06 PROCEDURE — 99999 PR PBB SHADOW E&M-EST. PATIENT-LVL III: CPT | Mod: PBBFAC,,, | Performed by: INTERNAL MEDICINE

## 2023-03-06 PROCEDURE — 99999 PR PBB SHADOW E&M-EST. PATIENT-LVL III: ICD-10-PCS | Mod: PBBFAC,,, | Performed by: INTERNAL MEDICINE

## 2023-03-06 PROCEDURE — 99213 OFFICE O/P EST LOW 20 MIN: CPT | Mod: PBBFAC | Performed by: INTERNAL MEDICINE

## 2023-03-06 PROCEDURE — 99214 OFFICE O/P EST MOD 30 MIN: CPT | Mod: S$PBB,,, | Performed by: INTERNAL MEDICINE

## 2023-03-06 NOTE — PROGRESS NOTES
Subjective:   Patient ID:  Lamine Fong is a 67 y.o. male who presents for follow up of No chief complaint on file.      66 yo male came in for 6 months f/u and preop clearance  PMH CAD h/o [facility] showed distal LCX , orthostatic hypotension. DM > 5 yrs former heavy drinker and smoker, coronary atherosclerosis. PAD, AAA 5 cm, left iliac aneurysm, and carotid artery Dz s/p left CEA by Dr. Fletcher  In , c/o+ postaural dizziness when taking amlodipine, metorpolol and clonidine.   Now on amlodipine only for HTN and dizziness resolved.   Drinking issue on and off for 30 years. Pint vodka and beers daily, Off alcohol for 30 days,   Smoker 40 yrs 1ppd and quit 2 months ago, now vaping and patch   echo biv function;  Carotid US There is less than 50% diameter reduction on the right. There is 50-69% stenosis within the left proximal and mid ICA  Brain mild microvascular Dz   abd CT Coronary and aortic calcific atherosclerosis are noted. AAA 4.6 cm infra renal   Today EKG NSR    Good appetite, weight gain   No SOB chest pain dizziness faint   Claudication if walking a lot     LE arterial US showed bilaetral SAF pcclued and ABIs 0.54  MPI showed possible apical ischemia and normal EF  Leg calf muscle pain after walking for 10 min. No rest pain. The feet normal color and warm. PT DP pulse weak  Off Metoprolol deu to bradycardia,.   Added lipitor     visit  H/o C showed distal LCx 100% lesion and collateral circulation to LPDA. Continue medical Rx.  PAD with exertional claudication.  Right groin stable  No chest pain, leg swelling      visit  Leg pain dtarted from the knee and calf muscle, worse after walking. Has the pain at rest. No change after added Pletal. No leg and feet ulcer. F/u at podiatry  No chest pain. Chronic SOB. No palpitation dizziness faint     Interval history  Facial numbness resolved after left carotid CEA.   Plan to have left leg fem bypass and then right leg  bypass. AAA procedure in the future  No chest pain dizziness palpitation and leg swelling   SOB mild, quit smoking         Past Medical History:   Diagnosis Date    Alcohol abuse     Quit 2022    Anticoagulant long-term use     Aortic aneurysm     Artery occlusion     Carotid artery occlusion     COPD (chronic obstructive pulmonary disease)     no home O2    Coronary artery disease     Depression     Diabetes mellitus     Hypertension     Hypokalemia     Hypomagnesemia     SHANELLE (obstructive sleep apnea)     without cpap       Past Surgical History:   Procedure Laterality Date    ANGIOGRAPHY OF LOWER EXTREMITY Right 2023    Procedure: Angiogram Extremity Unilateral/ right leg;  Surgeon: Bharat Fletcher IV, MD;  Location: [facility] CATH LAB;  Service: Cardiovascular;  Laterality: Right;    CAROTID ENDARTERECTOMY Left 2022    Procedure: ENDARTERECTOMY-CAROTID;  Surgeon: Bharat Fletcher IV, MD;  Location: [facility] OR;  Service: Cardiovascular;  Laterality: Left;    HERNIA REPAIR      umbilical    LEFT HEART CATHETERIZATION Left 2022    Procedure: CATHETERIZATION, HEART, LEFT;  Surgeon: Jadiel Simons MD;  Location: [facility] CATH LAB;  Service: Cardiology;  Laterality: Left;       Social History     Tobacco Use    Smoking status: Former     Packs/day: 1.50     Years: 44.00     Pack years: 66.00     Types: Vaping with nicotine, Cigars, Cigarettes     Start date: 1968     Quit date: 2021     Years since quittin.2    Smokeless tobacco: Current   Substance Use Topics    Alcohol use: Not Currently     Comment: quit 2022    Drug use: No       Family History   Problem Relation Age of Onset    Diabetes Mellitus Father          Review of Systems   Constitutional: Positive for malaise/fatigue. Negative for decreased appetite, diaphoresis, fever and night sweats.   HENT:  Negative for nosebleeds.    Eyes:  Negative for blurred vision and double vision.   Cardiovascular:  Positive for claudication and dyspnea on  exertion. Negative for chest pain, irregular heartbeat, leg swelling, near-syncope, orthopnea, palpitations, paroxysmal nocturnal dyspnea and syncope.   Respiratory:  Negative for cough, shortness of breath, sleep disturbances due to breathing, snoring, sputum production and wheezing.    Endocrine: Negative for cold intolerance and polyuria.   Hematologic/Lymphatic: Does not bruise/bleed easily.   Skin:  Negative for rash.   Musculoskeletal:  Positive for back pain. Negative for falls, joint pain, joint swelling and neck pain.   Gastrointestinal:  Negative for abdominal pain, heartburn, nausea and vomiting.   Genitourinary:  Negative for dysuria, frequency and hematuria.   Neurological:  Positive for dizziness. Negative for difficulty with concentration, focal weakness, headaches, light-headedness, numbness, seizures and weakness.   Psychiatric/Behavioral:  Negative for depression, memory loss and substance abuse. The patient does not have insomnia.    Allergic/Immunologic: Negative for HIV exposure and hives.     Objective:   Physical Exam  HENT:      Head: Normocephalic.   Eyes:      Pupils: Pupils are equal, round, and reactive to light.   Neck:      Thyroid: No thyromegaly.      Vascular: Normal carotid pulses. No carotid bruit or JVD.   Cardiovascular:      Rate and Rhythm: Normal rate and regular rhythm. No extrasystoles are present.     Chest Wall: PMI is not displaced.      Pulses: Normal pulses.      Heart sounds: Normal heart sounds. No murmur heard.    No gallop. No S3 sounds.   Pulmonary:      Effort: No respiratory distress.      Breath sounds: Normal breath sounds. No stridor.   Abdominal:      General: Bowel sounds are normal.      Palpations: Abdomen is soft.      Tenderness: There is no abdominal tenderness. There is no rebound.   Musculoskeletal:         General: Normal range of motion.   Skin:     Findings: No rash.   Neurological:      Mental Status: He is alert and oriented to person, place, and  time.   Psychiatric:         Behavior: Behavior normal.       Lab Results   Component Value Date    CHOL 123 10/02/2021    CHOL 142 06/29/2020     Lab Results   Component Value Date    HDL 47 10/02/2021    HDL 41 06/29/2020     Lab Results   Component Value Date    LDLCALC 63.6 10/02/2021    LDLCALC 77 06/29/2020     Lab Results   Component Value Date    TRIG 62 10/02/2021    TRIG 118 06/29/2020     Lab Results   Component Value Date    CHOLHDL 38.2 10/02/2021    CHOLHDL 3.5 06/29/2020       Chemistry        Component Value Date/Time     (L) 01/23/2023 1109    K 4.4 01/23/2023 1109     01/23/2023 1109    CO2 23 01/23/2023 1109    BUN 17 01/23/2023 1109    CREATININE 1.2 01/23/2023 1109     (H) 01/23/2023 1109        Component Value Date/Time    CALCIUM 9.5 01/23/2023 1109    ALKPHOS 79 09/28/2022 0346    AST 12 09/28/2022 0346    ALT 16 09/28/2022 0346    BILITOT 0.4 09/28/2022 0346    ESTGFRAFRICA >60 07/30/2022 0840    ESTGFRAFRICA >60 07/30/2022 0840    EGFRNONAA >60 07/30/2022 0840    EGFRNONAA >60 07/30/2022 0840          Lab Results   Component Value Date    HGBA1C 7.8 (H) 12/19/2022     Lab Results   Component Value Date    TSH 1.750 10/03/2021     Lab Results   Component Value Date    INR 1.0 01/23/2023    INR 0.9 02/28/2022     Lab Results   Component Value Date    WBC 8.93 01/23/2023    HGB 15.3 01/23/2023    HCT 45.7 01/23/2023    MCV 81 (L) 01/23/2023     01/23/2023     BMP  Sodium   Date Value Ref Range Status   01/23/2023 132 (L) 136 - 145 mmol/L Final     Potassium   Date Value Ref Range Status   01/23/2023 4.4 3.5 - 5.1 mmol/L Final     Chloride   Date Value Ref Range Status   01/23/2023 100 95 - 110 mmol/L Final     CO2   Date Value Ref Range Status   01/23/2023 23 23 - 29 mmol/L Final     BUN   Date Value Ref Range Status   01/23/2023 17 8 - 23 mg/dL Final     Creatinine   Date Value Ref Range Status   01/23/2023 1.2 0.5 - 1.4 mg/dL Final     Calcium   Date Value Ref Range  Status   01/23/2023 9.5 8.7 - 10.5 mg/dL Final     Anion Gap   Date Value Ref Range Status   01/23/2023 9 8 - 16 mmol/L Final     eGFR if    Date Value Ref Range Status   07/30/2022 >60 >60 mL/min/1.73 m^2 Final   07/30/2022 >60 >60 mL/min/1.73 m^2 Final     eGFR if non    Date Value Ref Range Status   07/30/2022 >60 >60 mL/min/1.73 m^2 Final     Comment:     Calculation used to obtain the estimated glomerular filtration  rate (eGFR) is the CKD-EPI equation.      07/30/2022 >60 >60 mL/min/1.73 m^2 Final     Comment:     Calculation used to obtain the estimated glomerular filtration  rate (eGFR) is the CKD-EPI equation.        BNP  @LABRCNTIP(BNP,BNPTRIAGEBLO)@  @LABRCNTIP(troponini)@  CrCl cannot be calculated (Patient's most recent lab result is older than the maximum 7 days allowed.).  No results found in the last 24 hours.  No results found in the last 24 hours.  No results found in the last 24 hours.    Assessment:      1. Preop cardiovascular exam    2. Hypertension associated with diabetes    3. Bilateral carotid artery stenosis    4. Coronary artery disease of native artery of native heart with stable angina pectoris    5. Infrarenal abdominal aortic aneurysm (AAA) without rupture    6. PAD (peripheral artery disease)    7. Type 2 diabetes mellitus with foot ulcer, without long-term current use of insulin        Plan:   Elevated periop risk of CV events for non-high risk procedure.  Good functional and exercise capacity.  No chest pain, active arrhythmia and CHF symptoms.  Ok to proceed the scheduled surgery without further cardiac study.  OK to hold Pletal 5 to 7 days before the procedure and resume ASAP postop.      Continue ASA Pletal Amlodipine metoprolol losartan and statin  Smoking cessation  DM Rx per PCP  Counseled DASH  Check Lipid profile with PCP in 6 months  Recommend heart-healthy diet, weight control and regular exercise.  Melva. Risk modification.   I have  reviewed all pertinent labs and cardiac studies independently. Plans and recommendations have been formulated under my direct supervision. All questions answered and patient voiced understanding.   If symptoms persist go to the ED  RTC in 6 months

## 2023-03-22 ENCOUNTER — PATIENT MESSAGE (OUTPATIENT)
Dept: ADMINISTRATIVE | Facility: HOSPITAL | Age: 68
End: 2023-03-22
Payer: MEDICARE

## 2023-04-14 ENCOUNTER — PES CALL (OUTPATIENT)
Dept: ADMINISTRATIVE | Facility: CLINIC | Age: 68
End: 2023-04-14
Payer: MEDICARE

## 2023-04-19 ENCOUNTER — PATIENT MESSAGE (OUTPATIENT)
Dept: ADMINISTRATIVE | Facility: HOSPITAL | Age: 68
End: 2023-04-19
Payer: MEDICARE

## 2023-04-26 ENCOUNTER — PATIENT MESSAGE (OUTPATIENT)
Dept: INTERNAL MEDICINE | Facility: CLINIC | Age: 68
End: 2023-04-26
Payer: MEDICARE

## 2023-04-26 DIAGNOSIS — E11.65 TYPE 2 DIABETES MELLITUS WITH HYPERGLYCEMIA, WITHOUT LONG-TERM CURRENT USE OF INSULIN: Primary | ICD-10-CM

## 2023-04-27 ENCOUNTER — EXTERNAL HOSPITAL ADMISSION (OUTPATIENT)
Dept: ADMINISTRATIVE | Facility: CLINIC | Age: 68
End: 2023-04-27
Payer: MEDICARE

## 2023-04-27 ENCOUNTER — PATIENT MESSAGE (OUTPATIENT)
Dept: ADMINISTRATIVE | Facility: CLINIC | Age: 68
End: 2023-04-27
Payer: MEDICARE

## 2023-04-27 ENCOUNTER — PATIENT OUTREACH (OUTPATIENT)
Dept: ADMINISTRATIVE | Facility: CLINIC | Age: 68
End: 2023-04-27
Payer: MEDICARE

## 2023-04-27 NOTE — PROGRESS NOTES
C3 nurse attempted to contact Lamine Fong for a TCC post hospital discharge follow up call. Daughter in law, Amada, answered but cannot provide patient's . Asked to call patient's son Arsenio. Call placed to son. No answer. Left voicemail with callback information. The patient has a scheduled HOSFU appointment with Bharat Fletcher MD (vascular surgeon) on 23 @ 2:45PM.

## 2023-04-27 NOTE — PROGRESS NOTES
C3 nurse attempted to contact Lamine Fong for a TCC post hospital discharge follow up call. No answer. Left voicemail with callback information. The patient has a scheduled HOSFU appointment with Bharat Fletcher MD (vascular surgeon) on 5/4/23 @ 2:45PM.

## 2023-05-04 ENCOUNTER — PATIENT MESSAGE (OUTPATIENT)
Dept: INTERNAL MEDICINE | Facility: CLINIC | Age: 68
End: 2023-05-04
Payer: MEDICARE

## 2023-05-04 DIAGNOSIS — E11.65 TYPE 2 DIABETES MELLITUS WITH HYPERGLYCEMIA, WITHOUT LONG-TERM CURRENT USE OF INSULIN: Primary | ICD-10-CM

## 2023-06-05 ENCOUNTER — OFFICE VISIT (OUTPATIENT)
Dept: INTERNAL MEDICINE | Facility: CLINIC | Age: 68
End: 2023-06-05
Payer: MEDICARE

## 2023-06-05 DIAGNOSIS — E11.69 HYPERLIPIDEMIA ASSOCIATED WITH TYPE 2 DIABETES MELLITUS: Chronic | ICD-10-CM

## 2023-06-05 DIAGNOSIS — I73.9 PAD (PERIPHERAL ARTERY DISEASE): ICD-10-CM

## 2023-06-05 DIAGNOSIS — I70.218 CLAUDICATION OF BOTH UPPER EXTREMITIES DUE TO ATHEROSCLEROSIS: ICD-10-CM

## 2023-06-05 DIAGNOSIS — E78.5 HYPERLIPIDEMIA ASSOCIATED WITH TYPE 2 DIABETES MELLITUS: Chronic | ICD-10-CM

## 2023-06-05 DIAGNOSIS — E11.65 TYPE 2 DIABETES MELLITUS WITH HYPERGLYCEMIA, WITHOUT LONG-TERM CURRENT USE OF INSULIN: Primary | ICD-10-CM

## 2023-06-05 DIAGNOSIS — E11.59 HYPERTENSION ASSOCIATED WITH DIABETES: Chronic | ICD-10-CM

## 2023-06-05 DIAGNOSIS — I25.118 CORONARY ARTERY DISEASE OF NATIVE ARTERY OF NATIVE HEART WITH STABLE ANGINA PECTORIS: Chronic | ICD-10-CM

## 2023-06-05 DIAGNOSIS — I65.23 BILATERAL CAROTID ARTERY STENOSIS: Chronic | ICD-10-CM

## 2023-06-05 DIAGNOSIS — I15.2 HYPERTENSION ASSOCIATED WITH DIABETES: Chronic | ICD-10-CM

## 2023-06-05 DIAGNOSIS — I73.9 PAD (PERIPHERAL ARTERY DISEASE): Chronic | ICD-10-CM

## 2023-06-05 DIAGNOSIS — E11.65 TYPE 2 DIABETES MELLITUS WITH HYPERGLYCEMIA, WITHOUT LONG-TERM CURRENT USE OF INSULIN: Primary | Chronic | ICD-10-CM

## 2023-06-05 PROBLEM — Z01.810 PREOP CARDIOVASCULAR EXAM: Status: RESOLVED | Noted: 2023-03-06 | Resolved: 2023-06-05

## 2023-06-05 PROCEDURE — 99214 OFFICE O/P EST MOD 30 MIN: CPT | Mod: 95,,, | Performed by: FAMILY MEDICINE

## 2023-06-05 PROCEDURE — 99214 PR OFFICE/OUTPT VISIT, EST, LEVL IV, 30-39 MIN: ICD-10-PCS | Mod: 95,,, | Performed by: FAMILY MEDICINE

## 2023-06-05 RX ORDER — METFORMIN HYDROCHLORIDE 1000 MG/1
1000 TABLET ORAL 2 TIMES DAILY WITH MEALS
Qty: 180 TABLET | Refills: 3 | Status: SHIPPED | OUTPATIENT
Start: 2023-06-05 | End: 2024-01-08 | Stop reason: SDUPTHER

## 2023-06-05 RX ORDER — METFORMIN HYDROCHLORIDE 1000 MG/1
1000 TABLET ORAL 2 TIMES DAILY WITH MEALS
Qty: 180 TABLET | Refills: 3 | OUTPATIENT
Start: 2023-06-05 | End: 2024-06-04

## 2023-06-05 NOTE — TELEPHONE ENCOUNTER
Spoke with patient's son and scheduled labs for this Friday as requested. Eye appointment with Dr. Gold already scheduled for August.

## 2023-06-05 NOTE — TELEPHONE ENCOUNTER
----- Message from Carlitos Michelle MD sent at 6/5/2023  1:07 PM CDT -----  Regarding: Labs and appointment to be scheduled  Seen for telemedicine visit today   Needs eye exam scheduled   Schedule A1c, microalbumin, and lipid panel ordered today on same day as eye exam  Preferably before surgery on 6/12/2023 or needs to be after 6/19/2023    Also ordered referral to diabetes management start insulin therapy   Please schedule

## 2023-06-05 NOTE — TELEPHONE ENCOUNTER
Refill Decision Note   Lamine Fong  is requesting a refill authorization.  Brief Assessment and Rationale for Refill:  Quick Discontinue     Medication Therapy Plan:  E-Prescribing Status: Receipt confirmed by pharmacy (6/5/2023  1:11 PM CDT)    Medication Reconciliation Completed: No   Comments:     No Care Gaps recommended.     Note composed:5:02 PM 06/05/2023

## 2023-06-06 PROBLEM — Z98.890 H/O CAROTID ENDARTERECTOMY: Status: ACTIVE | Noted: 2023-06-06

## 2023-06-06 NOTE — PROGRESS NOTES
Subjective:   Patient ID: Lamine Fong is a 68 y.o. male.  Chief Complaint:  No chief complaint on file.    The patient location is: [place]  The chief complaint leading to consultation is:  Diabetes follow-up    Visit type: audiovisual    Face to Face time with patient: 20 minutes  30 minutes of total time spent on the encounter, which includes face to face time and non-face to face time preparing to see the patient (eg, review of tests), Obtaining and/or reviewing separately obtained history, Documenting clinical information in the electronic or other health record, Independently interpreting results (not separately reported) and communicating results to the patient/family/caregiver, or Care coordination (not separately reported).     Each patient to whom he or she provides medical services by telemedicine is:  (1) informed of the relationship between the physician and patient and the respective role of any other health care provider with respect to management of the patient; and (2) notified that he or she may decline to receive medical services by telemedicine and may withdraw from such care at any time.    Presents for follow-up on diabetes mellitus  Last visit December 2022     - Type 2 diabetes mellitus with hyperglycemia, without long-term current use of insulin  - Coronary artery disease of native artery of native heart with stable angina pectoris  Improved with A1c 7.8%  Continued metformin 1000 mg twice a day, discussed 3 times a day dosing would not be any more effective and increased potential side effects and other health risk   Continued Amaryl 2 mg twice a day   Unfortunately unable to start a GLP1 injection, SGLT2 pill, or DPP4 for medication due to insurance coverage/cost  Was referred to diabetic management to start insulin therapy, but no visit scheduled to date   Reports majority of glucose readings between 200-300   No hypoglycemic episodes     - Gastroesophageal reflux disease without  esophagitis  Discussed need to change to Protonix 40 mg daily as needed  Changed due to chronic  Pletal use an interaction with other PPIs.    Reports compliance.  Denies side effects.    Symptoms remain stable and well controlled with medication change        Diabetes  He has type 2 diabetes mellitus. No MedicAlert identification noted. The initial diagnosis of diabetes was made 10 years ago. Pertinent negatives for hypoglycemia include no confusion, dizziness, headaches, hunger, mood changes, nervousness/anxiousness, pallor, seizures, sleepiness, speech difficulty, sweats or tremors. Associated symptoms include foot paresthesias. Pertinent negatives for diabetes include no blurred vision, no chest pain, no fatigue, no foot ulcerations, no polydipsia, no polyphagia, no polyuria, no visual change, no weakness and no weight loss. Pertinent negatives for hypoglycemia complications include no blackouts, no hospitalization, no nocturnal hypoglycemia, no required assistance and no required glucagon injection. Symptoms are worsening. Diabetic complications include heart disease, peripheral neuropathy and PVD. Pertinent negatives for diabetic complications include no autonomic neuropathy, CVA, impotence, nephropathy or retinopathy. He is currently taking insulin pre-breakfast. Insulin injections are given by patient. His weight is stable. He is following a generally healthy diet. When asked about meal planning, he reported none. He has not had a previous visit with a dietitian. He participates in exercise intermittently. He monitors blood glucose at home 1-2 x per day. He monitors urine at home <1 x per month. Blood glucose monitoring compliance is fair. His home blood glucose trend is increasing steadily. He sees a podiatrist.Eye exam is current.       Review of Systems   Constitutional:  Negative for chills, diaphoresis, fatigue, fever and weight loss.   Eyes:  Negative for blurred vision and visual disturbance.    Respiratory:  Negative for cough, chest tightness, shortness of breath and wheezing.    Cardiovascular:  Negative for chest pain, palpitations and leg swelling.   Gastrointestinal:  Negative for abdominal pain, constipation, diarrhea, nausea and vomiting.   Endocrine: Negative for cold intolerance, heat intolerance, polydipsia, polyphagia and polyuria.   Genitourinary:  Negative for difficulty urinating and impotence.   Musculoskeletal:  Negative for myalgias and neck pain.   Skin:  Negative for pallor and rash.   Neurological:  Negative for dizziness, tremors, seizures, syncope, speech difficulty, weakness, light-headedness, numbness and headaches.   Hematological:  Does not bruise/bleed easily.   Psychiatric/Behavioral:  Negative for confusion and sleep disturbance. The patient is not nervous/anxious.      Objective:     Physical Exam  Constitutional:       Appearance: Normal appearance.   Psychiatric:         Mood and Affect: Mood and affect normal.     Assessment:       ICD-10-CM ICD-9-CM   1. Type 2 diabetes mellitus with hyperglycemia, without long-term current use of insulin  E11.65 250.00     790.29   2. Hyperlipidemia associated with type 2 diabetes mellitus  E11.69 250.80    E78.5 272.4   3. Coronary artery disease of native artery of native heart with stable angina pectoris  I25.118 414.01     413.9   4. Bilateral carotid artery stenosis  I65.23 433.10     433.30   5. PAD (peripheral artery disease)  I73.9 443.9     Plan:   Type 2 diabetes mellitus with hyperglycemia, without long-term current use of insulin  Continue metformin 1000 mg twice a day  Continue Amaryl 2 mg twice a day  Referral to diabetes management to start insulin therapy   A1c today to establish baseline prior to insulin treatment   Eye exam scheduled  Microalbumin ordered.  If positive already on Ace/Arb     Hyperlipidemia associated with type 2 diabetes mellitus  Coronary artery disease of native artery of native heart with stable  angina pectoris  Bilateral carotid artery stenosis  PAD (peripheral artery disease)  Stable.  Asymptomatic.  Last LDL at goal.    Recheck lipid panel today   Adjust simvastatin 20 mg daily as needed    Return to clinic 3 months

## 2023-06-15 ENCOUNTER — EXTERNAL HOSPITAL ADMISSION (OUTPATIENT)
Dept: ADMINISTRATIVE | Facility: CLINIC | Age: 68
End: 2023-06-15
Payer: MEDICARE

## 2023-06-15 ENCOUNTER — PATIENT MESSAGE (OUTPATIENT)
Dept: ADMINISTRATIVE | Facility: CLINIC | Age: 68
End: 2023-06-15
Payer: MEDICARE

## 2023-06-15 ENCOUNTER — PATIENT OUTREACH (OUTPATIENT)
Dept: ADMINISTRATIVE | Facility: CLINIC | Age: 68
End: 2023-06-15
Payer: MEDICARE

## 2023-06-15 NOTE — PROGRESS NOTES
C3 nurse attempted to contact Lamine Fong's son, Arsenio, for a TCC post hospital discharge follow up call. No answer. Left voicemail with callback information. The patient has a scheduled HOSFU appointment with PJ CARR MD (surgeon) 6/29/23 @ 7:30am.

## 2023-06-15 NOTE — PROGRESS NOTES
New Patient - Virtual Telehealth Visit     The patient location is: Home ([place])  The chief complaint leading to consultation is: Diabetes Follow-up  Visit type: Virtual visit with synchronous audio and video via Epic Ha[employer] yesika  Total time spent with patient: 40 minutes     Each patient to whom I provide medical services by telemedicine is:  (1) informed of the relationship between the physician and patient and the respective role of any other health care provider with respect to management of the patient; and (2) notified that they may decline to receive medical services by telemedicine and may withdraw from such care at any time. Patient verbally consented to receive this service via voice-only telephone call.    PCP: Carlitos Michelle MD    Subjective:     Chief Complaint: Diabetes Consult    History of Present Illness: 68 y.o.  male for diabetes consult.   Type II diabetes for < 10 years.  Comorbidities: HTN, HLD, CAD, CHF, and PAD, s/p CEA  Son with patient during patient also providing history.    Known diabetes complications:  DKA/HHS: no  Retinopathy: no   Peripheral neuropathy: yes   Nephropathy: no    Personal history of pancreatitis: denies  Family history of MTC/MEN/endocrine tumors: denies     Denies recent hospital admissions or ER visits.   Denies having hypoglycemia.   Endorses diabetes related symptoms: None.    Blood glucose monitoring: fingerstick: 2 x/day   Per patient's recall, over the last 2 weeks   Fasting BG average 180-280   Bedtime average low 200's    No BG log for review.     Activity Level: Sedentary  Meal Plannin meals/day; infrequent snacks/day.  Missing lunch or dinner.     Medication compliance most of the time, diet compliance most of the time.   Has attended diabetes education in the past.     Previous regimen: Insulin (during hospital stay)    Past failed treatment: Mounjaro, Ozempic, SGLT2 inhibitor,  DPP4  inhibitor - cost prohibitive    Current DM  Medications:  Diabetes Medications               glimepiride (AMARYL) 2 MG tablet Take 1 tablet (2 mg total) by mouth 2 (two) times daily with meals.  Taking 1 tablet before breakfast and 1 tablet before dinner    metFORMIN (GLUCOPHAGE) 1000 MG tablet Take 1 tablet (1,000 mg total) by mouth 2 (two) times daily with meals.       Allergies  Review of patient's allergies indicates:  No Known Allergies    Labs Reviewed:  His most recent A1C is:  Lab Results   Component Value Date    HGBA1C 7.8 (H) 12/19/2022    HGBA1C 10.1 (H) 09/27/2022    HGBA1C 5.8 (H) 04/18/2022       His most recent BMP is:  Lab Results   Component Value Date     (L) 01/23/2023    K 4.4 01/23/2023     01/23/2023    CO2 23 01/23/2023    BUN 17 01/23/2023    CREATININE 1.2 01/23/2023    CALCIUM 9.5 01/23/2023    ANIONGAP 9 01/23/2023    ESTGFRAFRICA >60 07/30/2022    ESTGFRAFRICA >60 07/30/2022    EGFRNONAA >60 07/30/2022    EGFRNONAA >60 07/30/2022       No results found for: CPEPTIDE  No results found for: GLUTAMICACID    There is no height or weight on file to calculate BMI.    Wt Readings from Last 3 Encounters:   03/06/23 1454 118 kg (260 lb 2.3 oz)   01/23/23 1046 120.2 kg (265 lb)   12/14/22 0942 119.2 kg (262 lb 12.6 oz)        His blood sugar in clinic today is: Not assessed due to type of visit.    Diabetes Management Status  Statin: Taking  ACE/ARB: Taking    Screening or Prevention Patient's value Goal Complete/Controlled?   HgA1C Testing and Control   Lab Results   Component Value Date    HGBA1C 7.8 (H) 12/19/2022      Annually/Less than 8% Yes   Lipid profile : 10/02/2021 Annually No   LDL control Lab Results   Component Value Date    LDLCALC 63.6 10/02/2021    Annually/Less than 100 mg/dl  No   Nephropathy screening No results found for: MICALBCREAT  Lab Results   Component Value Date    PROTEINUA 1+ (A) 10/02/2021    Annually No   Blood pressure BP Readings from Last 1 Encounters:   03/06/23 128/66    Less than 140/90 Yes    Dilated retinal exam : 2022 Annually Yes    Foot exam   : 2022 Annually No     Social History     Socioeconomic History    Marital status:    Tobacco Use    Smoking status: Every Day     Packs/day: 1.00     Years: 40.00     Pack years: 40.00     Types: Cigarettes     Start date: 1968     Last attempt to quit: 2021     Years since quittin.5    Smokeless tobacco: Current   Substance and Sexual Activity    Alcohol use: Not Currently     Comment: History of alcoholism    Drug use: No    Sexual activity: Yes     Partners: Female     Social Determinants of Health     Financial Resource Strain: Low Risk     Difficulty of Paying Living Expenses: Not hard at all   Food Insecurity: Unknown    Worried About Running Out of Food in the Last Year: Never true   Transportation Needs: Unmet Transportation Needs    Lack of Transportation (Medical): Yes    Lack of Transportation (Non-Medical): Yes   Physical Activity: Insufficiently Active    Days of Exercise per Week: 3 days    Minutes of Exercise per Session: 30 min   Stress: Stress Concern Present    Feeling of Stress : To some extent   Social Connections: Moderately Isolated    Frequency of Communication with Friends and Family: More than three times a week    Frequency of Social Gatherings with Friends and Family: More than three times a week    Attends Islam Services: Never    Active Member of Clubs or Organizations: Yes    Attends Club or Organization Meetings: More than 4 times per year    Marital Status:    Housing Stability: High Risk    Unable to Pay for Housing in the Last Year: Yes    Number of Places Lived in the Last Year: 2    Unstable Housing in the Last Year: Yes     Past Medical History:   Diagnosis Date    Alcohol abuse     Quit 2022    Anticoagulant long-term use     Aortic aneurysm     Artery occlusion     Carotid artery occlusion     COPD (chronic obstructive pulmonary disease)     no home O2    Coronary artery  disease     Depression     Diabetes mellitus     Hypertension     Hypokalemia     Hypomagnesemia     SHANELLE (obstructive sleep apnea)     without cpap       Review of Systems   Constitutional:  Negative for activity change, appetite change, fatigue and unexpected weight change.   HENT:  Negative for dental problem and trouble swallowing.    Eyes:  Negative for visual disturbance.   Respiratory:  Negative for chest tightness and shortness of breath.    Cardiovascular:  Negative for chest pain, palpitations and leg swelling.   Gastrointestinal:  Negative for abdominal pain, constipation, diarrhea, nausea and vomiting.   Endocrine: Negative for polydipsia, polyphagia and polyuria.   Genitourinary:  Negative for difficulty urinating, dysuria, frequency and urgency.        Negative yeast infection   Musculoskeletal:  Negative for gait problem, myalgias and neck pain.   Integumentary:  Negative for rash and wound.   Allergic/Immunologic: Negative.    Neurological:  Negative for dizziness, syncope, weakness, numbness and headaches.        Both feet, intermittent tingling     Hematological: Negative.    Psychiatric/Behavioral:  Negative for confusion and sleep disturbance.    All other systems reviewed and are negative.     Objective:      Physical Exam  Constitutional:       General: He is awake.      Appearance: Normal appearance. He is well-developed and well-groomed.   HENT:      Head: Normocephalic and atraumatic.   Eyes:      General: Lids are normal. Gaze aligned appropriately.   Pulmonary:      Effort: Pulmonary effort is normal. No respiratory distress.   Neurological:      Mental Status: He is alert and oriented to person, place, and time.   Psychiatric:         Attention and Perception: Attention normal.         Mood and Affect: Mood and affect normal.         Speech: Speech normal.         Behavior: Behavior normal.         Thought Content: Thought content normal.         Cognition and Memory: Cognition normal.          Judgment: Judgment normal.         Assessment / Plan:     Type 2 diabetes mellitus with hyperglycemia, without long-term current use of insulin  -     Ambulatory referral/consult to Diabetic Advanced Practice Providers (Medical Management)  -     dulaglutide (TRULICITY) 0.75 mg/0.5 mL pen injector; Inject 0.75 mg into the skin every 7 days.  Dispense: 4 pen; Refill: 3  -     glimepiride (AMARYL) 4 MG tablet; Take 1 tablet (4 mg total) by mouth 2 (two) times daily before meals.  Dispense: 60 tablet; Refill: 3  -     Hemoglobin A1C; Future; Expected date: 09/12/2023    Hypertension associated with diabetes    Hyperlipidemia associated with type 2 diabetes mellitus    Additional Plan Details:  - Condition: Uncontrolled, most recent A1C of 7.8% was completed 6 months ago. However, BG readings have been elevated.   - Monitor blood glucose:  3x daily.Goals reviewed. Maintain BG log.   - Hypoglycemia protocol: Reviewed and risk discussed.  Notify if BG readings below 80. Protocol printout provided.   - Meal Planning: Limit carbohydrate intake 30-45 grams/meal, 3x daily and 15 grams/snack, limit 2/day.   - Activity level: Recommend at least 150 minutes of exercise in a week.  - BP and LDL: Recommend lifestyle modifications and follow up with PCP for management.   - Medication Regimen:     Continue Metformin 1000 mg, 1 tablet twice daily with meals  Increase Glimepiride to 4 mg, 1 tablet twice daily before meals  Begin Trulicity 0.75 mg, inject into the skin once a week: discussed benefits, SE/AR.    - Medication consideration: Increased WASHBURN dose. Start on GLP, check Trulicity coverage vs Victoza.  - Health Maintenance standards addressed today: A1c to be scheduled. Upcoming eye appt with Dr. Gold 8/23.  - Risks of uncontrolled DM explained. Importance of routine foot and eye exams reviewed. Scheduled as appropriate.  -The patient was explained the above plan and given opportunity to ask questions.  He understands,  chooses and consents to this plan and accepts all the risks, which include but are not limited to the risks mentioned above.   - Labs ordered as above.  - CDE follow up: Deferred   - Follow up: 1 month virtual.        Charlotte T. Delacruz, FNP-C [facility]    A total of 40 minutes was spent in face to face time, of which over 50 % was spent in counseling patient on disease process, complications, treatment, and side effects of medications.

## 2023-06-19 ENCOUNTER — OFFICE VISIT (OUTPATIENT)
Dept: DIABETES | Facility: CLINIC | Age: 68
End: 2023-06-19
Payer: MEDICARE

## 2023-06-19 DIAGNOSIS — E11.59 HYPERTENSION ASSOCIATED WITH DIABETES: Chronic | ICD-10-CM

## 2023-06-19 DIAGNOSIS — E78.5 HYPERLIPIDEMIA ASSOCIATED WITH TYPE 2 DIABETES MELLITUS: Chronic | ICD-10-CM

## 2023-06-19 DIAGNOSIS — E11.69 HYPERLIPIDEMIA ASSOCIATED WITH TYPE 2 DIABETES MELLITUS: Chronic | ICD-10-CM

## 2023-06-19 DIAGNOSIS — E11.65 TYPE 2 DIABETES MELLITUS WITH HYPERGLYCEMIA, WITHOUT LONG-TERM CURRENT USE OF INSULIN: Primary | ICD-10-CM

## 2023-06-19 DIAGNOSIS — I15.2 HYPERTENSION ASSOCIATED WITH DIABETES: Chronic | ICD-10-CM

## 2023-06-19 PROCEDURE — 99215 OFFICE O/P EST HI 40 MIN: CPT | Mod: 95,,, | Performed by: NURSE PRACTITIONER

## 2023-06-19 PROCEDURE — 99215 PR OFFICE/OUTPT VISIT, EST, LEVL V, 40-54 MIN: ICD-10-PCS | Mod: 95,,, | Performed by: NURSE PRACTITIONER

## 2023-06-19 RX ORDER — GLIMEPIRIDE 4 MG/1
4 TABLET ORAL
Qty: 60 TABLET | Refills: 3 | Status: SHIPPED | OUTPATIENT
Start: 2023-06-19 | End: 2023-09-12 | Stop reason: SDUPTHER

## 2023-06-19 RX ORDER — DULAGLUTIDE 0.75 MG/.5ML
0.75 INJECTION, SOLUTION SUBCUTANEOUS
Qty: 4 PEN | Refills: 3 | Status: SHIPPED | OUTPATIENT
Start: 2023-06-19 | End: 2023-06-21

## 2023-06-19 NOTE — PATIENT INSTRUCTIONS
Medication Regimen:   Continue Metformin 1000 mg, 1 tablet twice daily with meals  Increase Glimepiride to 4 mg, 1 tablet twice daily before meals  Begin Trulicity 0.75 mg, inject into the skin once a week    Additional instructions:  Trulicity Training Video:   https://www.EGT.com/how-to-use/non-insulin-pen/    Please either copy and paste the above link in your internet search bar or click on it if linked.    Patient Instructions:  Carbohydrate Count: 30-45 grams/meal and 15 grams/snack with limit of 2 snacks per day.  Exercise: Goal is 150 minutes or more per week.  Bring meter and blood sugar log to each appointment.     Goals for Blood Sugar:  Fastin-130 mg/dl  2 hours after meal:  mg/dl  Before Bed: 100-150 mg/dl  If Blood sugar is below 70 mg/dl, DO NOT take diabetes medication. Eat first and then recheck blood sugar in 20 minutes.  Foods to eat if blood sugar is below 70 mg/dl  1/2 glass of orange juice   1/2 regular cola can   3-4 hard candies   3-4 small glucose tabs.   Foods to eat if blood sugar is below 50 mg/dl  1 glass of orange juice  1 regular cola can  8 hard candies  8 small glucose tabs.   If you have repeated eating/blood sugar recheck process 2 times and blood sugar still below 70 mg/dl, call 911.

## 2023-06-19 NOTE — PROGRESS NOTES
C3 nurse spoke with Lamine Fong's son, Arsenio, for a TCC post hospital discharge follow up call. The patient has a scheduled HOSFU appointment with PJ CARR MD (surgeon) 6/29/23 @ 7:30am.

## 2023-06-21 ENCOUNTER — PATIENT MESSAGE (OUTPATIENT)
Dept: DIABETES | Facility: CLINIC | Age: 68
End: 2023-06-21
Payer: MEDICARE

## 2023-06-21 DIAGNOSIS — E11.65 TYPE 2 DIABETES MELLITUS WITH HYPERGLYCEMIA, WITHOUT LONG-TERM CURRENT USE OF INSULIN: Primary | ICD-10-CM

## 2023-06-21 RX ORDER — LIRAGLUTIDE 6 MG/ML
0.6 INJECTION SUBCUTANEOUS DAILY
Qty: 3 ML | Refills: 2 | Status: SHIPPED | OUTPATIENT
Start: 2023-06-21 | End: 2023-06-22 | Stop reason: SDUPTHER

## 2023-06-22 ENCOUNTER — PATIENT MESSAGE (OUTPATIENT)
Dept: DIABETES | Facility: CLINIC | Age: 68
End: 2023-06-22
Payer: MEDICARE

## 2023-06-22 DIAGNOSIS — E11.65 TYPE 2 DIABETES MELLITUS WITH HYPERGLYCEMIA, WITHOUT LONG-TERM CURRENT USE OF INSULIN: Primary | ICD-10-CM

## 2023-06-22 RX ORDER — LIRAGLUTIDE 6 MG/ML
INJECTION SUBCUTANEOUS
Qty: 6 ML | Refills: 2 | Status: SHIPPED | OUTPATIENT
Start: 2023-06-22 | End: 2023-09-12

## 2023-06-23 ENCOUNTER — TELEPHONE (OUTPATIENT)
Dept: DIABETES | Facility: CLINIC | Age: 68
End: 2023-06-23
Payer: MEDICARE

## 2023-06-23 NOTE — TELEPHONE ENCOUNTER
Called and spoke with patient son. Advise him that Victoza prescription should go through this time. Advise him to let his dad know to only take 0.6mg daily until he sees Maureen again mid July. He voice understanding.

## 2023-07-24 DIAGNOSIS — J44.9 CHRONIC OBSTRUCTIVE PULMONARY DISEASE, UNSPECIFIED COPD TYPE: ICD-10-CM

## 2023-07-24 RX ORDER — ALBUTEROL SULFATE 90 UG/1
2 AEROSOL, METERED RESPIRATORY (INHALATION) EVERY 4 HOURS PRN
Qty: 18 G | Refills: 11 | Status: SHIPPED | OUTPATIENT
Start: 2023-07-24

## 2023-08-12 DIAGNOSIS — I25.118 CORONARY ARTERY DISEASE OF NATIVE ARTERY OF NATIVE HEART WITH STABLE ANGINA PECTORIS: ICD-10-CM

## 2023-08-15 RX ORDER — AMLODIPINE BESYLATE 10 MG/1
10 TABLET ORAL
Qty: 90 TABLET | Refills: 3 | Status: SHIPPED | OUTPATIENT
Start: 2023-08-15

## 2023-08-30 ENCOUNTER — PATIENT OUTREACH (OUTPATIENT)
Dept: ADMINISTRATIVE | Facility: HOSPITAL | Age: 68
End: 2023-08-30
Payer: MEDICARE

## 2023-08-30 NOTE — PROGRESS NOTES
DM A1c report: Per chart review, patient has a lab appointment on 9/12/23, diabetic orders linked.

## 2023-09-12 ENCOUNTER — LAB VISIT (OUTPATIENT)
Dept: LAB | Facility: HOSPITAL | Age: 68
End: 2023-09-12
Attending: FAMILY MEDICINE
Payer: MEDICARE

## 2023-09-12 ENCOUNTER — OFFICE VISIT (OUTPATIENT)
Dept: INTERNAL MEDICINE | Facility: CLINIC | Age: 68
End: 2023-09-12
Payer: MEDICARE

## 2023-09-12 DIAGNOSIS — I70.218 CLAUDICATION OF BOTH UPPER EXTREMITIES DUE TO ATHEROSCLEROSIS: ICD-10-CM

## 2023-09-12 DIAGNOSIS — I73.9 PAD (PERIPHERAL ARTERY DISEASE): Chronic | ICD-10-CM

## 2023-09-12 DIAGNOSIS — E11.59 HYPERTENSION ASSOCIATED WITH DIABETES: Chronic | ICD-10-CM

## 2023-09-12 DIAGNOSIS — J43.1 PANLOBULAR EMPHYSEMA: ICD-10-CM

## 2023-09-12 DIAGNOSIS — I65.23 BILATERAL CAROTID ARTERY STENOSIS: Chronic | ICD-10-CM

## 2023-09-12 DIAGNOSIS — E11.65 TYPE 2 DIABETES MELLITUS WITH HYPERGLYCEMIA, WITHOUT LONG-TERM CURRENT USE OF INSULIN: ICD-10-CM

## 2023-09-12 DIAGNOSIS — I25.118 CORONARY ARTERY DISEASE OF NATIVE ARTERY OF NATIVE HEART WITH STABLE ANGINA PECTORIS: Chronic | ICD-10-CM

## 2023-09-12 DIAGNOSIS — I15.2 HYPERTENSION ASSOCIATED WITH DIABETES: Chronic | ICD-10-CM

## 2023-09-12 DIAGNOSIS — E11.69 HYPERLIPIDEMIA ASSOCIATED WITH TYPE 2 DIABETES MELLITUS: Chronic | ICD-10-CM

## 2023-09-12 DIAGNOSIS — E78.5 HYPERLIPIDEMIA ASSOCIATED WITH TYPE 2 DIABETES MELLITUS: Chronic | ICD-10-CM

## 2023-09-12 DIAGNOSIS — I50.32 CHRONIC DIASTOLIC (CONGESTIVE) HEART FAILURE: ICD-10-CM

## 2023-09-12 DIAGNOSIS — Z98.890 H/O CAROTID ENDARTERECTOMY: ICD-10-CM

## 2023-09-12 DIAGNOSIS — E11.65 TYPE 2 DIABETES MELLITUS WITH HYPERGLYCEMIA, WITHOUT LONG-TERM CURRENT USE OF INSULIN: Primary | ICD-10-CM

## 2023-09-12 DIAGNOSIS — E11.69 HYPERLIPIDEMIA ASSOCIATED WITH TYPE 2 DIABETES MELLITUS: ICD-10-CM

## 2023-09-12 DIAGNOSIS — I73.9 PAD (PERIPHERAL ARTERY DISEASE): ICD-10-CM

## 2023-09-12 DIAGNOSIS — E78.5 HYPERLIPIDEMIA ASSOCIATED WITH TYPE 2 DIABETES MELLITUS: ICD-10-CM

## 2023-09-12 PROBLEM — E11.621 TYPE 2 DIABETES MELLITUS WITH FOOT ULCER, WITHOUT LONG-TERM CURRENT USE OF INSULIN: Chronic | Status: RESOLVED | Noted: 2021-10-02 | Resolved: 2023-09-12

## 2023-09-12 PROBLEM — L97.509 TYPE 2 DIABETES MELLITUS WITH FOOT ULCER, WITHOUT LONG-TERM CURRENT USE OF INSULIN: Chronic | Status: RESOLVED | Noted: 2021-10-02 | Resolved: 2023-09-12

## 2023-09-12 PROBLEM — R94.39 ABNORMAL NUCLEAR STRESS TEST: Status: RESOLVED | Noted: 2022-02-21 | Resolved: 2023-09-12

## 2023-09-12 PROBLEM — R00.1 BRADYCARDIA: Status: RESOLVED | Noted: 2021-10-04 | Resolved: 2023-09-12

## 2023-09-12 LAB
ALBUMIN/CREAT UR: 24.2 UG/MG (ref 0–30)
CHOLEST SERPL-MCNC: 181 MG/DL (ref 120–199)
CHOLEST/HDLC SERPL: 6.5 {RATIO} (ref 2–5)
CREAT UR-MCNC: 132 MG/DL (ref 23–375)
ESTIMATED AVG GLUCOSE: 146 MG/DL (ref 68–131)
ESTIMATED AVG GLUCOSE: 146 MG/DL (ref 68–131)
HBA1C MFR BLD: 6.7 % (ref 4–5.6)
HBA1C MFR BLD: 6.7 % (ref 4–5.6)
HDLC SERPL-MCNC: 28 MG/DL (ref 40–75)
HDLC SERPL: 15.5 % (ref 20–50)
LDLC SERPL CALC-MCNC: 135.6 MG/DL (ref 63–159)
MICROALBUMIN UR DL<=1MG/L-MCNC: 32 UG/ML
NONHDLC SERPL-MCNC: 153 MG/DL
TRIGL SERPL-MCNC: 87 MG/DL (ref 30–150)

## 2023-09-12 PROCEDURE — 83036 HEMOGLOBIN GLYCOSYLATED A1C: CPT | Performed by: FAMILY MEDICINE

## 2023-09-12 PROCEDURE — 36415 COLL VENOUS BLD VENIPUNCTURE: CPT | Performed by: FAMILY MEDICINE

## 2023-09-12 PROCEDURE — 99214 PR OFFICE/OUTPT VISIT, EST, LEVL IV, 30-39 MIN: ICD-10-PCS | Mod: 95,,, | Performed by: FAMILY MEDICINE

## 2023-09-12 PROCEDURE — 80061 LIPID PANEL: CPT | Performed by: FAMILY MEDICINE

## 2023-09-12 PROCEDURE — 99214 OFFICE O/P EST MOD 30 MIN: CPT | Mod: 95,,, | Performed by: FAMILY MEDICINE

## 2023-09-12 PROCEDURE — 82570 ASSAY OF URINE CREATININE: CPT | Performed by: FAMILY MEDICINE

## 2023-09-12 RX ORDER — GLIMEPIRIDE 4 MG/1
4 TABLET ORAL
Qty: 180 TABLET | Refills: 3 | Status: SHIPPED | OUTPATIENT
Start: 2023-09-12 | End: 2024-01-08 | Stop reason: SDUPTHER

## 2023-09-12 NOTE — PROGRESS NOTES
Subjective:   Patient ID: Lamine Fong is a 68 y.o. male.  Chief Complaint:  No chief complaint on file.    The patient location is: Home  The chief complaint leading to consultation is:  Three-month follow-up    Visit type: audiovisual    Face to Face time with patient: 20 minuyes  30 minutes of total time spent on the encounter, which includes face to face time and non-face to face time preparing to see the patient (eg, review of tests), Obtaining and/or reviewing separately obtained history, Documenting clinical information in the electronic or other health record, Independently interpreting results (not separately reported) and communicating results to the patient/family/caregiver, or Care coordination (not separately reported).     Each patient to whom he or she provides medical services by telemedicine is:  (1) informed of the relationship between the physician and patient and the respective role of any other health care provider with respect to management of the patient; and (2) notified that he or she may decline to receive medical services by telemedicine and may withdraw from such care at any time.    Presents for follow-up on diabetes and hyperlipidemia  Last visit with me June 2023   Diabetes not controlled.  Referred to diabetes management.    Continue metformin 1000 mg twice a day.  Increase Amaryl 4 mg twice a day.  Recommended start of Victoza, but patient reports too expensive and never took medication  States current blood glucose is range from 120-200 but no longer having any readings over 300   Denies any hypoglycemic episodes   Had repeat A1c and microalbumin done earlier today.  Results pending.    For hyperlipidemia and known carotid, coronary, peripheral vascular disease on statin.    Reports compliance.  Denies side effects.    Denies any current symptoms.    Lipid panel done earlier today.  Results pending.  Also has hypertension which is well controlled on current medication and  history of CHF currently not active.    Up-to-date on follow-up with cardiology and vascular surgery     No new complaints today    Review of Systems   Constitutional:  Negative for activity change, chills, diaphoresis, fatigue, fever and unexpected weight change.   HENT:  Negative for hearing loss, rhinorrhea and trouble swallowing.    Eyes:  Negative for discharge and visual disturbance.   Respiratory:  Negative for cough, chest tightness, shortness of breath and wheezing.    Cardiovascular:  Negative for chest pain, palpitations and leg swelling.   Gastrointestinal:  Negative for abdominal pain, blood in stool, constipation, diarrhea, nausea and vomiting.   Endocrine: Negative for cold intolerance, heat intolerance, polydipsia, polyphagia and polyuria.   Genitourinary:  Negative for difficulty urinating, hematuria and urgency.   Musculoskeletal:  Negative for arthralgias, joint swelling, myalgias and neck pain.   Skin:  Negative for rash.   Neurological:  Negative for dizziness, tremors, syncope, weakness, light-headedness, numbness and headaches.   Hematological:  Does not bruise/bleed easily.   Psychiatric/Behavioral:  Negative for confusion, dysphoric mood and sleep disturbance. The patient is not nervous/anxious.      Objective:     Physical Exam  Constitutional:       Appearance: Normal appearance.   Psychiatric:         Mood and Affect: Mood and affect normal.       Assessment:       ICD-10-CM ICD-9-CM   1. Type 2 diabetes mellitus with hyperglycemia, without long-term current use of insulin  E11.65 250.00     790.29   2. Hyperlipidemia associated with type 2 diabetes mellitus  E11.69 250.80    E78.5 272.4   3. PAD (peripheral artery disease)  I73.9 443.9   4. Bilateral carotid artery stenosis  I65.23 433.10     433.30   5. H/O carotid endarterectomy  Z98.890 V45.89   6. Claudication of both upper extremities due to atherosclerosis  I70.218 440.21   7. Coronary artery disease of native artery of native  heart with stable angina pectoris  I25.118 414.01     413.9   8. Hypertension associated with diabetes  E11.59 250.80    I15.2 401.9   9. Chronic diastolic (congestive) heart failure  I50.32 428.32     428.0   10. Panlobular emphysema  J43.1 492.8     Plan:   Type 2 diabetes mellitus with hyperglycemia, without long-term current use of insulin  -     glimepiride (AMARYL) 4 MG tablet; Take 1 tablet (4 mg total) by mouth 2 (two) times daily before meals.  Dispense: 180 tablet; Refill: 3  A1c pending   Continue metformin 1000 mg twice a day  No hypoglycemic events, continue Amaryl 4 mg twice a day   If A1c greater than 8%, will need additional agent  If A1c less than 8%, no additional medication at this time   If microalbumin positive, on Ace/Arb     Hyperlipidemia associated with type 2 diabetes mellitus  PAD (peripheral artery disease)  Bilateral carotid artery stenosis  H/O carotid endarterectomy  Claudication of both upper extremities due to atherosclerosis  Coronary artery disease of native artery of native heart with stable angina pectoris  Asymptomatic   Lipid panel pending   Adjust simvastatin 20 mg daily if needed   Continue aspirin 81 mg daily  Continue Pletal 100 mg twice a day  Follow-up with cardiology and vascular surgery as scheduled     Hypertension associated with diabetes  Chronic diastolic (congestive) heart failure  Controlled.  Stable.  Asymptomatic.    Continue current medications     Panlobular emphysema  Stable   No frequent rescue inhaler use needed  No controller medication indicated     Follow up with any/all specialists scheduled    Return to clinic 3 months or sooner if needed

## 2023-09-19 DIAGNOSIS — Z00.00 ROUTINE HEALTH MAINTENANCE: Primary | ICD-10-CM

## 2023-09-19 DIAGNOSIS — E11.59 HYPERTENSION ASSOCIATED WITH DIABETES: Chronic | ICD-10-CM

## 2023-09-19 DIAGNOSIS — I15.2 HYPERTENSION ASSOCIATED WITH DIABETES: Chronic | ICD-10-CM

## 2023-10-02 ENCOUNTER — OFFICE VISIT (OUTPATIENT)
Dept: DIABETES | Facility: CLINIC | Age: 68
End: 2023-10-02
Payer: MEDICARE

## 2023-10-02 ENCOUNTER — OFFICE VISIT (OUTPATIENT)
Dept: OPHTHALMOLOGY | Facility: CLINIC | Age: 68
End: 2023-10-02
Payer: MEDICARE

## 2023-10-02 DIAGNOSIS — E11.9 TYPE 2 DIABETES MELLITUS WITHOUT RETINOPATHY: Primary | ICD-10-CM

## 2023-10-02 DIAGNOSIS — E11.59 HYPERTENSION ASSOCIATED WITH DIABETES: ICD-10-CM

## 2023-10-02 DIAGNOSIS — E11.65 TYPE 2 DIABETES MELLITUS WITH HYPERGLYCEMIA, WITHOUT LONG-TERM CURRENT USE OF INSULIN: Primary | ICD-10-CM

## 2023-10-02 DIAGNOSIS — H52.203 ASTIGMATISM WITH PRESBYOPIA, BILATERAL: ICD-10-CM

## 2023-10-02 DIAGNOSIS — E11.65 TYPE 2 DIABETES MELLITUS WITH HYPERGLYCEMIA, WITHOUT LONG-TERM CURRENT USE OF INSULIN: ICD-10-CM

## 2023-10-02 DIAGNOSIS — H25.13 NUCLEAR SCLEROSIS, BILATERAL: ICD-10-CM

## 2023-10-02 DIAGNOSIS — E11.69 HYPERLIPIDEMIA ASSOCIATED WITH TYPE 2 DIABETES MELLITUS: ICD-10-CM

## 2023-10-02 DIAGNOSIS — I15.2 HYPERTENSION ASSOCIATED WITH DIABETES: ICD-10-CM

## 2023-10-02 DIAGNOSIS — H35.89 RPE MOTTLING OF MACULA: ICD-10-CM

## 2023-10-02 DIAGNOSIS — E78.5 HYPERLIPIDEMIA ASSOCIATED WITH TYPE 2 DIABETES MELLITUS: ICD-10-CM

## 2023-10-02 DIAGNOSIS — H52.4 ASTIGMATISM WITH PRESBYOPIA, BILATERAL: ICD-10-CM

## 2023-10-02 DIAGNOSIS — H25.013 CORTICAL AGE-RELATED CATARACT OF BOTH EYES: ICD-10-CM

## 2023-10-02 DIAGNOSIS — E11.36 DIABETIC CATARACT OF BOTH EYES: ICD-10-CM

## 2023-10-02 PROCEDURE — 99214 OFFICE O/P EST MOD 30 MIN: CPT | Mod: 95,,, | Performed by: NURSE PRACTITIONER

## 2023-10-02 PROCEDURE — 99214 OFFICE O/P EST MOD 30 MIN: CPT | Mod: PBBFAC | Performed by: OPTOMETRIST

## 2023-10-02 PROCEDURE — 99999 PR PBB SHADOW E&M-EST. PATIENT-LVL IV: ICD-10-PCS | Mod: PBBFAC,,, | Performed by: OPTOMETRIST

## 2023-10-02 PROCEDURE — 99999 PR PBB SHADOW E&M-EST. PATIENT-LVL IV: CPT | Mod: PBBFAC,,, | Performed by: OPTOMETRIST

## 2023-10-02 PROCEDURE — 92014 PR EYE EXAM, EST PATIENT,COMPREHESV: ICD-10-PCS | Mod: S$PBB,,, | Performed by: OPTOMETRIST

## 2023-10-02 PROCEDURE — 92134 POSTERIOR SEGMENT OCT RETINA (OCULAR COHERENCE TOMOGRAPHY)-BOTH EYES: ICD-10-PCS | Mod: 26,S$PBB,, | Performed by: OPTOMETRIST

## 2023-10-02 PROCEDURE — 92014 COMPRE OPH EXAM EST PT 1/>: CPT | Mod: S$PBB,,, | Performed by: OPTOMETRIST

## 2023-10-02 PROCEDURE — 92134 CPTRZ OPH DX IMG PST SGM RTA: CPT | Mod: PBBFAC | Performed by: OPTOMETRIST

## 2023-10-02 PROCEDURE — 99214 PR OFFICE/OUTPT VISIT, EST, LEVL IV, 30-39 MIN: ICD-10-PCS | Mod: 95,,, | Performed by: NURSE PRACTITIONER

## 2023-10-02 NOTE — PATIENT INSTRUCTIONS
Medication Regimen:   Continue Metformin 1000 mg, 1 tablet twice daily with meals  Continue Glimepiride 4 mg, 1 tablet twice daily before meals    Patient Instructions:  Carbohydrate Count: 30-45 grams/meal and 15 grams/snack with limit of 2 snacks per day.  Exercise: Goal is 150 minutes or more per week.  Bring meter and blood sugar log to each appointment.     Goals for Blood Sugar:  Fastin-130 mg/dl  2 hours after meal:  mg/dl  Before Bed: 100-150 mg/dl  If Blood sugar is below 70 mg/dl, DO NOT take diabetes medication. Eat first and then recheck blood sugar in 20 minutes.  Foods to eat if blood sugar is below 70 mg/dl  1/2 glass of orange juice   1/2 regular cola can   3-4 hard candies   3-4 small glucose tabs.   Foods to eat if blood sugar is below 50 mg/dl  1 glass of orange juice  1 regular cola can  8 hard candies  8 small glucose tabs.   If you have repeated eating/blood sugar recheck process 2 times and blood sugar still below 70 mg/dl, call 911.

## 2023-10-02 NOTE — PROGRESS NOTES
Established Patient - Virtual Telehealth Visit     The patient location is: Home ([place])  The chief complaint leading to consultation is: Diabetes Follow-up  Visit type: Virtual visit with synchronous audio and video via Epic Haiku yesika  Total time spent with patient: 14 minutes     Each patient to whom I provide medical services by telemedicine is:  (1) informed of the relationship between the physician and patient and the respective role of any other health care provider with respect to management of the patient; and (2) notified that they may decline to receive medical services by telemedicine and may withdraw from such care at any time. Patient verbally consented to receive this service via voice-only telephone call.    PCP: Carlitos Michelle MD    Subjective:     Chief Complaint: Diabetes follow up.  Last visit (virtual) with me: 23 for initial consult.    History of Present Illness: 68 y.o.  male for diabetes follow up.   Type II diabetes for < 10 years.  Comorbidities: HTN, HLD, CAD, CHF, and PAD, s/p CEA  Personal history of pancreatitis: denies  Family history of pancreatic cancer in first-degree relative: denies  Family history of MTC/MEN/endocrine tumors: denies     Known diabetes complications:  DKA/HHS: no  Retinopathy: no   Peripheral neuropathy: yes   Nephropathy: no    Interval history:  Son with patient during patient also providing history.  At last visit, started on Trulicity.  Unable to start, cost  prohibitive.  Increased WASHBURN dose, tolerating well.    Denies recent hospital admissions or ER visits.   Denies having hypoglycemia.   Endorses diabetes related symptoms: None.    Blood glucose monitoring: fingerstick: 2x/day   Per patient's recall, over the last 8 weeks   Fasting BG average 155 this am, range 122-191    Bedtime average 180's    No BG log for review.     Activity Level: Sedentary  Meal Plannin meals/day; infrequent snacks/day.  Missing lunch or dinner.  "    Medication compliance most of the time, diet compliance most of the time.   Has attended diabetes education in the past.     Previous regimen: Insulin (during hospital stay)    Past failed treatment: Mounjaro, Ozempic, SGLT2 inhibitor,  DPP4  inhibitor, Trulicity - cost prohibitive    Current DM Medications:  Diabetes Medications               glimepiride (AMARYL) 4 MG tablet Take 1 tablet (4 mg total) by mouth 2 (two) times daily before meals.    metFORMIN (GLUCOPHAGE) 1000 MG tablet Take 1 tablet (1,000 mg total) by mouth 2 (two) times daily with meals.       Allergies  Review of patient's allergies indicates:  No Known Allergies    Labs Reviewed:  His most recent A1C is:  Lab Results   Component Value Date    HGBA1C 6.7 (H) 09/12/2023    HGBA1C 6.7 (H) 09/12/2023    HGBA1C 7.8 (H) 12/19/2022       His most recent BMP is:  Lab Results   Component Value Date     (L) 01/23/2023    K 4.4 01/23/2023     01/23/2023    CO2 23 01/23/2023    BUN 17 01/23/2023    CREATININE 1.2 01/23/2023    CALCIUM 9.5 01/23/2023    ANIONGAP 9 01/23/2023    ESTGFRAFRICA >60 07/30/2022    ESTGFRAFRICA >60 07/30/2022    EGFRNONAA >60 07/30/2022    EGFRNONAA >60 07/30/2022       No results found for: "CPEPTIDE"  No results found for: "GLUTAMICACID"    There is no height or weight on file to calculate BMI.    Wt Readings from Last 3 Encounters:   03/06/23 1454 118 kg (260 lb 2.3 oz)   01/23/23 1046 120.2 kg (265 lb)   12/14/22 0942 119.2 kg (262 lb 12.6 oz)        His blood sugar in clinic today is: Not assessed due to type of visit.    Diabetes Management Status  Statin: Taking  ACE/ARB: Taking    Screening or Prevention Patient's value Goal Complete/Controlled?   HgA1C Testing and Control   Lab Results   Component Value Date    HGBA1C 6.7 (H) 09/12/2023    HGBA1C 6.7 (H) 09/12/2023      Annually/Less than 8% Yes   Lipid profile : 09/12/2023 Annually Yes   LDL control Lab Results   Component Value Date    LDLCALC 135.6 " 2023    Annually/Less than 100 mg/dl  No   Nephropathy screening Lab Results   Component Value Date    MICALBCREAT 24.2 2023     Lab Results   Component Value Date    PROTEINUA 1+ (A) 10/02/2021    Annually Yes   Blood pressure BP Readings from Last 1 Encounters:   23 128/66    Less than 140/90 Yes   Dilated retinal exam : 2022 Annually Yes    Foot exam   : 2022 Annually No     Social History     Socioeconomic History    Marital status:    Tobacco Use    Smoking status: Every Day     Current packs/day: 0.00     Average packs/day: 1 pack/day for 53.9 years (53.9 ttl pk-yrs)     Types: Cigarettes     Start date: 1968     Last attempt to quit: 2021     Years since quittin.8    Smokeless tobacco: Current   Substance and Sexual Activity    Alcohol use: Not Currently     Comment: History of alcoholism    Drug use: No    Sexual activity: Yes     Partners: Female     Social Determinants of Health     Financial Resource Strain: Low Risk  (2022)    Overall Financial Resource Strain (CARDIA)     Difficulty of Paying Living Expenses: Not hard at all   Food Insecurity: Unknown (2022)    Hunger Vital Sign     Worried About Running Out of Food in the Last Year: Never true   Transportation Needs: Unmet Transportation Needs (2022)    PRAPARE - Transportation     Lack of Transportation (Medical): Yes     Lack of Transportation (Non-Medical): Yes   Physical Activity: Insufficiently Active (2022)    Exercise Vital Sign     Days of Exercise per Week: 3 days     Minutes of Exercise per Session: 30 min   Stress: Stress Concern Present (2022)    [employer] - Occupational Stress Questionnaire     Feeling of Stress : To some extent   Social Connections: Moderately Isolated (2022)    Social Connection and Isolation Panel [NHANES]     Frequency of Communication with Friends and Family: More than three times a week     Frequency of  Social Gatherings with Friends and Family: More than three times a week     Attends [employer] Services: Never     Active Member of Clubs or Organizations: Yes     Attends Club or Organization Meetings: More than 4 times per year     Marital Status:    Housing Stability: High Risk (9/28/2022)    Housing Stability Vital Sign     Unable to Pay for Housing in the Last Year: Yes     Number of Places Lived in the Last Year: 2     Unstable Housing in the Last Year: Yes     Past Medical History:   Diagnosis Date    Alcohol abuse     Quit 1/2022    Anticoagulant long-term use     Aortic aneurysm     Artery occlusion     Carotid artery occlusion     COPD (chronic obstructive pulmonary disease)     no home O2    Coronary artery disease     Depression     Diabetes mellitus     Hypertension     Hypokalemia     Hypomagnesemia     SHANELLE (obstructive sleep apnea)     without cpap     Review of Systems   Constitutional:  Negative for activity change, appetite change, fatigue and unexpected weight change.   HENT:  Negative for dental problem and trouble swallowing.    Eyes:  Negative for visual disturbance.   Respiratory:  Negative for chest tightness and shortness of breath.    Cardiovascular:  Negative for chest pain, palpitations and leg swelling.   Gastrointestinal:  Negative for abdominal pain, constipation, diarrhea, nausea and vomiting.   Endocrine: Negative for polydipsia, polyphagia and polyuria.   Genitourinary:  Negative for difficulty urinating, dysuria, frequency and urgency.        Negative yeast infection   Musculoskeletal:  Negative for gait problem, myalgias and neck pain.   Integumentary:  Negative for rash and wound.   Allergic/Immunologic: Negative.    Neurological:  Negative for dizziness, syncope, weakness, numbness and headaches.        Both feet, intermittent tingling   Hematological: Negative.    Psychiatric/Behavioral:  Negative for confusion and sleep disturbance.    All other systems reviewed and are  negative.     Objective:      Physical Exam  Constitutional:       General: He is awake.      Appearance: Normal appearance. He is well-developed and well-groomed.   HENT:      Head: Normocephalic and atraumatic.   Eyes:      General: Lids are normal. Gaze aligned appropriately.   Pulmonary:      Effort: Pulmonary effort is normal. No respiratory distress.   Neurological:      Mental Status: He is alert and oriented to person, place, and time.   Psychiatric:         Attention and Perception: Attention normal.         Mood and Affect: Mood and affect normal.         Speech: Speech normal.         Behavior: Behavior normal.         Thought Content: Thought content normal.         Cognition and Memory: Cognition normal.         Judgment: Judgment normal.         Assessment / Plan:     Diagnoses and all orders for this visit:    Type 2 diabetes mellitus with hyperglycemia, without long-term current use of insulin  -     Hemoglobin A1C; Future    Hypertension associated with diabetes    Hyperlipidemia associated with type 2 diabetes mellitus    Additional Plan Details:  - Condition: Uncontrolled, most recent A1C of 7.8% was completed 6 months ago. However, BG readings have been elevated.   - Monitor blood glucose:  3x daily.Goals reviewed. Maintain BG log.   - Hypoglycemia protocol: Reviewed and risk discussed.  Notify if BG readings below 80. Protocol printout provided.   - Meal Planning: Limit carbohydrate intake 30-45 grams/meal, 3x daily and 15 grams/snack, limit 2/day.   - Activity level: Recommend at least 150 minutes of exercise in a week.  - BP and LDL: Recommend lifestyle modifications and follow up with PCP for management.   - Medication Regimen:     Continue Metformin 1000 mg, 1 tablet twice daily with meals  Continue Glimepiride 4 mg, 1 tablet twice daily before meals    - Medication consideration: Continue regimen. Pt states he will add additional plan coverage and would like to consider starting Trulicity  then.  - Health Maintenance standards addressed today: A1c to be scheduled. Upcoming eye appt with Dr. Gold 10/2/23.  - Risks of uncontrolled DM explained. Importance of routine foot and eye exams reviewed. Scheduled as appropriate.  -The patient was explained the above plan and given opportunity to ask questions.  He understands, chooses and consents to this plan and accepts all the risks, which include but are not limited to the risks mentioned above.   - Labs ordered as above.  - CDE follow up: Deferred   - Follow up: 3 months in clinic.       Maureen Rocha, FNP-C [facility]    A total of 14 minutes was spent in face to face time, of which over 50 % was spent in counseling patient on disease process, complications, treatment, and side effects of medications.

## 2023-10-02 NOTE — PROGRESS NOTES
HPI     Diabetic Eye Exam            Comments: Last eye exam 02/01/22 DNL  RTC 1 yr for dilated eye exam with mOCT      Recommended patient begin AREDS vitamins.  Diagnosed with diabetes about 5-6 years ago  Blood sugar 155  Lab Results       Component                Value               Date                       HGBA1C                   6.7 (H)             09/12/2023                 HGBA1C                   6.7 (H)             09/12/2023            Vision changes since last eye exam?: yes reading is more difficult     Any eye pain today: no    Othe         Last edited by Jayla Barboza on 10/2/2023  9:26 AM.            Assessment /Plan     For exam results, see Encounter Report.    Type 2 diabetes mellitus without retinopathy  There was no diabetic retinopathy present in either eye today.   Recommended that pt continue care with PCP and/or specialists regarding diabetes.  Follow-up dilated eye exam recommended in 12 months, sooner with any vision changes or new concerns.    Nuclear sclerosis, bilateral  Cortical age-related cataract of both eyes  Diabetic cataract of both eyes  Visually significant cataract.  Patient is at the option stage.   Cataract surgery will improve vision, but necessity is dependent on patient's symptoms.   Will continue to monitor over the next 12 months. Pt to call or RTC with any significant change in vision prior to next visit.    RPE mottling of macula, right eye  -     Posterior Segment OCT Retina-Both eyes    Mild RPE mottling at last visit, today more characteristic of AMD OD today  Pt is asymptomatic at this time  Refer to Dr. Hernandez for eval     Astigmatism with presbyopia, bilateral  Eyeglass Final Rx       Eyeglass Final Rx         Sphere Cylinder Axis Add    Right +0.50 +1.00 020 +2.50    Left -0.25 +1.00 175 +2.50      Expiration Date: 10/2/2024              Eyeglass Final Rx #2         Sphere Cylinder Axis Add    Right +3.00 +1.00 020     Left +2.25 +1.00 175       Type: SVL  Reading    Expiration Date: 10/2/2024              Eyeglass Final Rx Comments    Signs are correct                  RTC next available with [facility] for retinal consult or PRN if any problems.   Discussed above and answered questions.

## 2023-10-30 ENCOUNTER — HOSPITAL ENCOUNTER (OUTPATIENT)
Dept: CARDIOLOGY | Facility: HOSPITAL | Age: 68
Discharge: HOME | End: 2023-10-30
Attending: INTERNAL MEDICINE
Payer: MEDICARE

## 2023-10-30 ENCOUNTER — OFFICE VISIT (OUTPATIENT)
Dept: CARDIOLOGY | Facility: CLINIC | Age: 68
End: 2023-10-30
Payer: MEDICARE

## 2023-10-30 VITALS
SYSTOLIC BLOOD PRESSURE: 132 MMHG | BODY MASS INDEX: 32.22 KG/M2 | DIASTOLIC BLOOD PRESSURE: 78 MMHG | HEIGHT: 76 IN | WEIGHT: 264.56 LBS | HEART RATE: 73 BPM

## 2023-10-30 DIAGNOSIS — Z00.00 ROUTINE HEALTH MAINTENANCE: ICD-10-CM

## 2023-10-30 DIAGNOSIS — Z98.890 H/O CAROTID ENDARTERECTOMY: ICD-10-CM

## 2023-10-30 DIAGNOSIS — I50.32 CHRONIC DIASTOLIC (CONGESTIVE) HEART FAILURE: ICD-10-CM

## 2023-10-30 DIAGNOSIS — E11.59 HYPERTENSION ASSOCIATED WITH DIABETES: Chronic | ICD-10-CM

## 2023-10-30 DIAGNOSIS — I25.118 CORONARY ARTERY DISEASE OF NATIVE ARTERY OF NATIVE HEART WITH STABLE ANGINA PECTORIS: Primary | Chronic | ICD-10-CM

## 2023-10-30 DIAGNOSIS — I71.43 INFRARENAL ABDOMINAL AORTIC ANEURYSM (AAA) WITHOUT RUPTURE: ICD-10-CM

## 2023-10-30 DIAGNOSIS — I72.3 ANEURYSM OF LEFT ILIAC ARTERY: ICD-10-CM

## 2023-10-30 DIAGNOSIS — I70.218 CLAUDICATION OF BOTH UPPER EXTREMITIES DUE TO ATHEROSCLEROSIS: ICD-10-CM

## 2023-10-30 DIAGNOSIS — I65.23 BILATERAL CAROTID ARTERY STENOSIS: Chronic | ICD-10-CM

## 2023-10-30 DIAGNOSIS — I73.9 PAD (PERIPHERAL ARTERY DISEASE): Chronic | ICD-10-CM

## 2023-10-30 DIAGNOSIS — E11.69 HYPERLIPIDEMIA ASSOCIATED WITH TYPE 2 DIABETES MELLITUS: Chronic | ICD-10-CM

## 2023-10-30 DIAGNOSIS — E78.5 HYPERLIPIDEMIA ASSOCIATED WITH TYPE 2 DIABETES MELLITUS: Chronic | ICD-10-CM

## 2023-10-30 DIAGNOSIS — I15.2 HYPERTENSION ASSOCIATED WITH DIABETES: Chronic | ICD-10-CM

## 2023-10-30 DIAGNOSIS — E11.65 TYPE 2 DIABETES MELLITUS WITH HYPERGLYCEMIA, WITHOUT LONG-TERM CURRENT USE OF INSULIN: Chronic | ICD-10-CM

## 2023-10-30 DIAGNOSIS — E66.9 OBESITY (BMI 30.0-34.9): ICD-10-CM

## 2023-10-30 PROCEDURE — 99999 PR PBB SHADOW E&M-EST. PATIENT-LVL IV: CPT | Mod: PBBFAC,,, | Performed by: INTERNAL MEDICINE

## 2023-10-30 PROCEDURE — 93005 ELECTROCARDIOGRAM TRACING: CPT

## 2023-10-30 PROCEDURE — 99214 OFFICE O/P EST MOD 30 MIN: CPT | Mod: PBBFAC | Performed by: INTERNAL MEDICINE

## 2023-10-30 PROCEDURE — 93010 EKG 12-LEAD: ICD-10-PCS | Mod: ,,, | Performed by: INTERNAL MEDICINE

## 2023-10-30 PROCEDURE — 99214 OFFICE O/P EST MOD 30 MIN: CPT | Mod: S$PBB,,, | Performed by: INTERNAL MEDICINE

## 2023-10-30 PROCEDURE — 99214 PR OFFICE/OUTPT VISIT, EST, LEVL IV, 30-39 MIN: ICD-10-PCS | Mod: S$PBB,,, | Performed by: INTERNAL MEDICINE

## 2023-10-30 PROCEDURE — 99999 PR PBB SHADOW E&M-EST. PATIENT-LVL IV: ICD-10-PCS | Mod: PBBFAC,,, | Performed by: INTERNAL MEDICINE

## 2023-10-30 PROCEDURE — 93010 ELECTROCARDIOGRAM REPORT: CPT | Mod: ,,, | Performed by: INTERNAL MEDICINE

## 2023-10-30 RX ORDER — EVOLOCUMAB 140 MG/ML
140 INJECTION, SOLUTION SUBCUTANEOUS
Qty: 2 ML | Refills: 0 | Status: ACTIVE | OUTPATIENT
Start: 2023-10-30 | End: 2024-02-05

## 2023-10-30 NOTE — PROGRESS NOTES
Subjective:   Patient ID:  Lamine Fong is a 68 y.o. male who presents for follow up of Foot Swelling (Some occasional edema in feet)      66 yo male came in for 6 months f/u and preop clearance  PMH CAD h/o C showed distal LCX , orthostatic hypotension. DM > 5 yrs former heavy drinker and smoker, coronary atherosclerosis. PAD, AAA 5 cm, left iliac aneurysm, and carotid artery Dz s/p left CEA by Dr. Fletcher  In , c/o+ postaural dizziness when taking amlodipine, metorpolol and clonidine.   Now on amlodipine only for HTN and dizziness resolved.   Drinking issue on and off for 30 years. Pint vodka and beers daily, Off alcohol for 30 days,   Smoker 40 yrs 1ppd and quit 2 months ago, now vaping and patch   echo biv function;  Carotid US There is less than 50% diameter reduction on the right. There is 50-69% stenosis within the left proximal and mid ICA  Brain mild microvascular Dz   abd CT Coronary and aortic calcific atherosclerosis are noted. AAA 4.6 cm infra renal   Today EKG NSR    Good appetite, weight gain   No SOB chest pain dizziness faint   Claudication if walking a lot     LE arterial US showed bilaetral SAF pcclued and ABIs 0.54  MPI showed possible apical ischemia and normal EF  Leg calf muscle pain after walking for 10 min. No rest pain. The feet normal color and warm. PT DP pulse weak  Off Metoprolol deu to bradycardia,.   Added lipitor     visit  H/o LHC showed distal LCx 100% lesion and collateral circulation to LPDA. Continue medical Rx.  PAD with exertional claudication.  Right groin stable  No chest pain, leg swelling      visit  Leg pain dtarted from the knee and calf muscle, worse after walking. Has the pain at rest. No change after added Pletal. No leg and feet ulcer. F/u at podiatry  No chest pain. Chronic SOB. No palpitation dizziness faint     03/23 visit  Facial numbness resolved after left carotid CEA.   Plan to have left leg fem bypass and then  right leg bypass. AAA procedure in the future  No chest pain dizziness palpitation and leg swelling   SOB mild, quit smoking     Interval history  S/p bilateral fem bypass by Dr. Fletcher at [facility] in .   Now improved walking. Cramp resolved. Remains peripheral neuropathy.   No chest pain, dizziness palpitation orthopnea and PND. No active bleeding   Some feet swelling  Ekg NSR PAcs  AAA stenting pending    24 failed to deliver repatha per [facility]      Past Medical History:   Diagnosis Date    Alcohol abuse     Quit 2022    Anticoagulant long-term use     Aortic aneurysm     Artery occlusion     Carotid artery occlusion     COPD (chronic obstructive pulmonary disease)     no home O2    Coronary artery disease     Depression     Diabetes mellitus     Hypertension     Hypokalemia     Hypomagnesemia     SHANELLE (obstructive sleep apnea)     without cpap       Past Surgical History:   Procedure Laterality Date    ANGIOGRAPHY OF LOWER EXTREMITY Right 2023    Procedure: Angiogram Extremity Unilateral/ right leg;  Surgeon: Bharat Fletcher IV, MD;  Location: [facility] CATH LAB;  Service: Cardiovascular;  Laterality: Right;    CAROTID ENDARTERECTOMY Left 2022    Procedure: ENDARTERECTOMY-CAROTID;  Surgeon: Bharat Fletcher IV, MD;  Location: [facility] OR;  Service: Cardiovascular;  Laterality: Left;    HERNIA REPAIR      umbilical    LEFT HEART CATHETERIZATION Left 2022    Procedure: CATHETERIZATION, HEART, LEFT;  Surgeon: Jadiel Simons MD;  Location: [facility] CATH LAB;  Service: Cardiology;  Laterality: Left;       Social History     Tobacco Use    Smoking status: Every Day     Current packs/day: 0.00     Average packs/day: 1 pack/day for 53.9 years (53.9 ttl pk-yrs)     Types: Cigarettes     Start date: 1968     Last attempt to quit: 2021     Years since quittin.8    Smokeless tobacco: Current   Substance Use Topics    Alcohol use: Not Currently     Comment: History of alcoholism     Drug use: No       Family History   Problem Relation Age of Onset    Diabetes Mellitus Father     Diabetes Father     Heart disease Father     Diabetes Mother     Heart disease Brother          ROS    Objective:   Physical Exam  HENT:      Head: Normocephalic.   Eyes:      Pupils: Pupils are equal, round, and reactive to light.   Neck:      Thyroid: No thyromegaly.      Vascular: Normal carotid pulses. No carotid bruit or JVD.   Cardiovascular:      Rate and Rhythm: Normal rate and regular rhythm. No extrasystoles are present.     Chest Wall: PMI is not displaced.      Pulses: Normal pulses.      Heart sounds: Normal heart sounds. No murmur heard.     No gallop. No S3 sounds.   Pulmonary:      Effort: No respiratory distress.      Breath sounds: Normal breath sounds. No stridor.   Abdominal:      General: Bowel sounds are normal.      Palpations: Abdomen is soft.      Tenderness: There is no abdominal tenderness. There is no rebound.   Musculoskeletal:         General: Normal range of motion.   Skin:     Findings: No rash.   Neurological:      Mental Status: He is alert and oriented to person, place, and time.   Psychiatric:         Behavior: Behavior normal.       Lab Results   Component Value Date    CHOL 181 09/12/2023    CHOL 123 10/02/2021    CHOL 142 06/29/2020     Lab Results   Component Value Date    HDL 28 (L) 09/12/2023    HDL 47 10/02/2021    HDL 41 06/29/2020     Lab Results   Component Value Date    LDLCALC 135.6 09/12/2023    LDLCALC 63.6 10/02/2021    LDLCALC 77 06/29/2020     Lab Results   Component Value Date    TRIG 87 09/12/2023    TRIG 62 10/02/2021    TRIG 118 06/29/2020     Lab Results   Component Value Date    CHOLHDL 15.5 (L) 09/12/2023    CHOLHDL 38.2 10/02/2021    CHOLHDL 3.5 06/29/2020       Chemistry        Component Value Date/Time     (L) 01/23/2023 1109    K 4.4 01/23/2023 1109     01/23/2023 1109    CO2 23 01/23/2023 1109    BUN 17 01/23/2023 1109    CREATININE 1.2  01/23/2023 1109     (H) 01/23/2023 1109        Component Value Date/Time    CALCIUM 9.5 01/23/2023 1109    ALKPHOS 79 09/28/2022 0346    AST 12 09/28/2022 0346    ALT 16 09/28/2022 0346    BILITOT 0.4 09/28/2022 0346    ESTGFRAFRICA >60 07/30/2022 0840    ESTGFRAFRICA >60 07/30/2022 0840    EGFRNONAA >60 07/30/2022 0840    EGFRNONAA >60 07/30/2022 0840          Lab Results   Component Value Date    HGBA1C 6.7 (H) 09/12/2023    HGBA1C 6.7 (H) 09/12/2023     Lab Results   Component Value Date    TSH 1.750 10/03/2021     Lab Results   Component Value Date    INR 1.0 01/23/2023    INR 0.9 02/28/2022     Lab Results   Component Value Date    WBC 8.93 01/23/2023    HGB 15.3 01/23/2023    HCT 45.7 01/23/2023    MCV 81 (L) 01/23/2023     01/23/2023     BMP  Sodium   Date Value Ref Range Status   01/23/2023 132 (L) 136 - 145 mmol/L Final     Potassium   Date Value Ref Range Status   01/23/2023 4.4 3.5 - 5.1 mmol/L Final     Chloride   Date Value Ref Range Status   01/23/2023 100 95 - 110 mmol/L Final     CO2   Date Value Ref Range Status   01/23/2023 23 23 - 29 mmol/L Final     BUN   Date Value Ref Range Status   01/23/2023 17 8 - 23 mg/dL Final     Creatinine   Date Value Ref Range Status   01/23/2023 1.2 0.5 - 1.4 mg/dL Final     Calcium   Date Value Ref Range Status   01/23/2023 9.5 8.7 - 10.5 mg/dL Final     Anion Gap   Date Value Ref Range Status   01/23/2023 9 8 - 16 mmol/L Final     eGFR if    Date Value Ref Range Status   07/30/2022 >60 >60 mL/min/1.73 m^2 Final   07/30/2022 >60 >60 mL/min/1.73 m^2 Final     eGFR if non    Date Value Ref Range Status   07/30/2022 >60 >60 mL/min/1.73 m^2 Final     Comment:     Calculation used to obtain the estimated glomerular filtration  rate (eGFR) is the CKD-EPI equation.      07/30/2022 >60 >60 mL/min/1.73 m^2 Final     Comment:     Calculation used to obtain the estimated glomerular filtration  rate (eGFR) is the CKD-EPI equation.         BNP  @LABRCNTIP(BNP,BNPTRIAGEBLO)@  @LABRCNTIP(troponini)@  CrCl cannot be calculated (Patient's most recent lab result is older than the maximum 7 days allowed.).  No results found in the last 24 hours.  No results found in the last 24 hours.  No results found in the last 24 hours.    Assessment:      1. Coronary artery disease of native artery of native heart with stable angina pectoris    2. Infrarenal abdominal aortic aneurysm (AAA) without rupture    3. Hyperlipidemia associated with type 2 diabetes mellitus    4. Hypertension associated with diabetes    5. Aneurysm of left iliac artery    6. Bilateral carotid artery stenosis    7. Chronic diastolic (congestive) heart failure    8. PAD (peripheral artery disease)    9. Claudication of both upper extremities due to atherosclerosis    10. H/O carotid endarterectomy    11. Type 2 diabetes mellitus with hyperglycemia, without long-term current use of insulin    12. Obesity (BMI 30.0-34.9)        Plan:   Add repatha for HLD   Continue asa pletal statin BB losartan   DM Rx per PCP  Counseled DASH  Check Lipid profile with PCP in 6 months  Recommend heart-healthy diet, weight control and regular exercise.  Melva. Risk modification.   I have reviewed all pertinent labs and cardiac studies independently. Plans and recommendations have been formulated under my direct supervision. All questions answered and patient voiced understanding.   If symptoms persist go to the ED  RTC in 6 months

## 2023-10-31 RX ORDER — METOPROLOL SUCCINATE 25 MG/1
25 TABLET, EXTENDED RELEASE ORAL DAILY
Qty: 30 TABLET | Refills: 5 | Status: SHIPPED | OUTPATIENT
Start: 2023-10-31

## 2023-10-31 RX ORDER — SIMVASTATIN 20 MG/1
20 TABLET, FILM COATED ORAL NIGHTLY
Qty: 90 TABLET | Refills: 3 | Status: SHIPPED | OUTPATIENT
Start: 2023-10-31

## 2023-11-25 NOTE — TELEPHONE ENCOUNTER
Care Due:                  Date            Visit Type   Department     Provider  --------------------------------------------------------------------------------                                ESTABLISHED                              PATIENT -    [facility]  Last Visit: 09-      VIRTUAL      MEDICINE       Carlitos Michelle                              ESTABLISHED                              PATIENT -    [facility]  Next Visit: 12-      VIRTUAL      MEDICINE       Carlitos Michelle                                                            Last  Test          Frequency    Reason                     Performed    Due Date  --------------------------------------------------------------------------------    Cr..........  12 months..  glimepiride, metFORMIN...  01- 01-    [facility] [employer] Embedded Care Due Messages. Reference number: [id number].   11/25/2023 1:07:12 PM CST

## 2023-11-27 RX ORDER — GABAPENTIN 300 MG/1
CAPSULE ORAL
Qty: 90 CAPSULE | Refills: 11 | Status: SHIPPED | OUTPATIENT
Start: 2023-11-27

## 2023-12-16 DIAGNOSIS — K21.9 GASTROESOPHAGEAL REFLUX DISEASE WITHOUT ESOPHAGITIS: ICD-10-CM

## 2023-12-16 NOTE — TELEPHONE ENCOUNTER
Care Due:                  Date            Visit Type   Department     Provider  --------------------------------------------------------------------------------                                ESTABLISHED                              PATIENT -    [facility]  Last Visit: 09-      VIRTUAL      MEDICINE       Carlitos Michelle                              ESTABLISHED                              PATIENT -    [facility]  Next Visit: 12-      VIRTUAL      MEDICINE       Carlitos Michelle                                                            Last  Test          Frequency    Reason                     Performed    Due Date  --------------------------------------------------------------------------------    HBA1C.......  6 months...  glimepiride, metFORMIN...  09-   03-    [employer] Embedded Care Due Messages. Reference number: [id number].   12/16/2023 1:42:21 PM CST

## 2023-12-16 NOTE — TELEPHONE ENCOUNTER
Refill Routing Note   Medication(s) are not appropriate for processing by [facility] for the following reason(s):        No active prescription written by provider    [facility] action(s):  Defer     Requires labs : Yes      Medication Therapy Plan: FOVS;  AS OF 2023      Appointments  past 12m or future 3m with PCP    Date Provider   Last Visit   2023 Carlitos Michelle MD   Next Visit   2023 Carlitos Michelle MD   ED visits in past 90 days: 0        Note composed:2:10 PM 2023

## 2023-12-18 RX ORDER — PANTOPRAZOLE SODIUM 40 MG/1
TABLET, DELAYED RELEASE ORAL
Qty: 90 TABLET | Refills: 3 | Status: SHIPPED | OUTPATIENT
Start: 2023-12-18

## 2024-01-08 ENCOUNTER — OFFICE VISIT (OUTPATIENT)
Dept: DIABETES | Facility: CLINIC | Age: 69
End: 2024-01-08
Payer: MEDICARE

## 2024-01-08 VITALS
SYSTOLIC BLOOD PRESSURE: 136 MMHG | WEIGHT: 266.75 LBS | BODY MASS INDEX: 32.48 KG/M2 | HEART RATE: 55 BPM | HEIGHT: 76 IN | DIASTOLIC BLOOD PRESSURE: 78 MMHG

## 2024-01-08 DIAGNOSIS — E11.69 HYPERLIPIDEMIA ASSOCIATED WITH TYPE 2 DIABETES MELLITUS: ICD-10-CM

## 2024-01-08 DIAGNOSIS — E11.65 TYPE 2 DIABETES MELLITUS WITH HYPERGLYCEMIA, WITHOUT LONG-TERM CURRENT USE OF INSULIN: Primary | ICD-10-CM

## 2024-01-08 DIAGNOSIS — E11.59 HYPERTENSION ASSOCIATED WITH DIABETES: ICD-10-CM

## 2024-01-08 DIAGNOSIS — I15.2 HYPERTENSION ASSOCIATED WITH DIABETES: ICD-10-CM

## 2024-01-08 DIAGNOSIS — B36.9 FUNGAL INFECTION OF SKIN: ICD-10-CM

## 2024-01-08 DIAGNOSIS — E78.5 HYPERLIPIDEMIA ASSOCIATED WITH TYPE 2 DIABETES MELLITUS: ICD-10-CM

## 2024-01-08 LAB — GLUCOSE SERPL-MCNC: 169 MG/DL (ref 70–110)

## 2024-01-08 PROCEDURE — 99214 OFFICE O/P EST MOD 30 MIN: CPT | Mod: S$PBB,,, | Performed by: NURSE PRACTITIONER

## 2024-01-08 PROCEDURE — 82962 GLUCOSE BLOOD TEST: CPT | Mod: PBBFAC | Performed by: NURSE PRACTITIONER

## 2024-01-08 PROCEDURE — 99999PBSHW POCT GLUCOSE, HAND-HELD DEVICE: Mod: PBBFAC,,,

## 2024-01-08 PROCEDURE — 99215 OFFICE O/P EST HI 40 MIN: CPT | Mod: PBBFAC | Performed by: NURSE PRACTITIONER

## 2024-01-08 PROCEDURE — 99999 PR PBB SHADOW E&M-EST. PATIENT-LVL V: CPT | Mod: PBBFAC,,, | Performed by: NURSE PRACTITIONER

## 2024-01-08 RX ORDER — TIRZEPATIDE 2.5 MG/.5ML
2.5 INJECTION, SOLUTION SUBCUTANEOUS
Qty: 4 PEN | Refills: 5 | Status: SHIPPED | OUTPATIENT
Start: 2024-01-08 | End: 2024-01-09 | Stop reason: SDUPTHER

## 2024-01-08 RX ORDER — KETOCONAZOLE 20 MG/G
CREAM TOPICAL 2 TIMES DAILY
Qty: 30 G | Refills: 1 | Status: SHIPPED | OUTPATIENT
Start: 2024-01-08

## 2024-01-08 RX ORDER — METFORMIN HYDROCHLORIDE 1000 MG/1
1000 TABLET ORAL 2 TIMES DAILY WITH MEALS
Qty: 180 TABLET | Refills: 1 | Status: SHIPPED | OUTPATIENT
Start: 2024-01-08 | End: 2024-07-06

## 2024-01-08 RX ORDER — GLIMEPIRIDE 4 MG/1
4 TABLET ORAL
Qty: 180 TABLET | Refills: 1 | Status: SHIPPED | OUTPATIENT
Start: 2024-01-08 | End: 2024-07-06

## 2024-01-08 NOTE — PROGRESS NOTES
PCP: Carlitos Michelle MD    Subjective:     Chief Complaint: Diabetes follow up.  Last visit (virtual) with me: 10/2/23    History of Present Illness: 68 y.o.  male for diabetes follow up.   Type II diabetes for < 10 years.  Comorbidities: HTN, HLD, CAD, CHF, and PAD, s/p CEA  Personal history of pancreatitis: denies  Family history of pancreatic cancer in first-degree relative: denies  Family history of MTC/MEN/endocrine tumors: denies     Known diabetes complications:  DKA/HHS: no  Retinopathy: no   Peripheral neuropathy: yes   Nephropathy: no    Interval history:  Accompanied by son during visit.  At previous visits, was started on Trulicity, however cost  prohibitive.  He has more insurance coverage now.  Patient would like to start on medication.    Denies recent hospital admissions or ER visits.   Denies having hypoglycemia.   Endorses diabetes related symptoms: None.    Blood glucose monitoring: fingerstick: 2x/day, sporadic  Per patient's recall, over the last 4 weeks   Fasting 's - 170's   AC lunch low 200's   AC dinner 170's - 200's     No BG log for review.     Activity Level: Sedentary  Meal Plannin meals/day; infrequent snacks/day.  Missing lunch or dinner.     Medication compliance most of the time, diet compliance most of the time.   Has attended diabetes education in the past.     Previous regimen: Insulin (during hospital stay)    Past failed treatment: Mounjaro, Ozempic, SGLT2 inhibitor,  DPP4  inhibitor, Trulicity - cost prohibitive    Current DM Medications:  Diabetes Medications               glimepiride (AMARYL) 4 MG tablet Take 1 tablet (4 mg total) by mouth 2 (two) times daily before meals.    metFORMIN (GLUCOPHAGE) 1000 MG tablet Take 1 tablet (1,000 mg total) by mouth 2 (two) times daily with meals.       Allergies  Review of patient's allergies indicates:  No Known Allergies    Labs Reviewed:  His most recent A1C is:  Lab Results   Component Value Date    HGBA1C 6.7  "(H) 09/12/2023    HGBA1C 6.7 (H) 09/12/2023    HGBA1C 7.8 (H) 12/19/2022       His most recent BMP is:  Lab Results   Component Value Date     (L) 01/23/2023    K 4.4 01/23/2023     01/23/2023    CO2 23 01/23/2023    BUN 17 01/23/2023    CREATININE 1.2 01/23/2023    CALCIUM 9.5 01/23/2023    ANIONGAP 9 01/23/2023    ESTGFRAFRICA >60 07/30/2022    ESTGFRAFRICA >60 07/30/2022    EGFRNONAA >60 07/30/2022    EGFRNONAA >60 07/30/2022       No results found for: "CPEPTIDE"  No results found for: "GLUTAMICACID"    There is no height or weight on file to calculate BMI.    Wt Readings from Last 3 Encounters:   10/30/23 1130 120 kg (264 lb 8.8 oz)   03/06/23 1454 118 kg (260 lb 2.3 oz)   01/23/23 1046 120.2 kg (265 lb)      His blood sugar in clinic today is:   Lab Results   Component Value Date    POCTGLUCOSE 169 (H) 01/08/2023     Diabetes Management Status  Statin: Taking  ACE/ARB: Taking    Screening or Prevention Patient's value Goal Complete/Controlled?   HgA1C Testing and Control   Lab Results   Component Value Date    HGBA1C 6.7 (H) 09/12/2023    HGBA1C 6.7 (H) 09/12/2023      Annually/Less than 8% Yes   Lipid profile : 09/12/2023 Annually Yes   LDL control Lab Results   Component Value Date    LDLCALC 135.6 09/12/2023    Annually/Less than 100 mg/dl  No   Nephropathy screening Lab Results   Component Value Date    MICALBCREAT 24.2 09/12/2023     Lab Results   Component Value Date    PROTEINUA 1+ (A) 10/02/2021    Annually Yes   Blood pressure BP Readings from Last 1 Encounters:   10/30/23 132/78    Less than 140/90 Yes   Dilated retinal exam : 10/02/2023 Annually Yes    Foot exam   : 01/08/2023 Annually No     Social History     Socioeconomic History    Marital status:    Tobacco Use    Smoking status: Every Day     Current packs/day: 0.00     Average packs/day: 1 pack/day for 53.9 years (53.9 ttl pk-yrs)     Types: Cigarettes, Vaping with nicotine     Start date: 1/1/1968     Last attempt to " quit: 2021     Years since quittin.0    Smokeless tobacco: Current   Substance and Sexual Activity    Alcohol use: Not Currently     Comment: History of alcoholism    Drug use: No    Sexual activity: Yes     Partners: Female     Social Determinants of Health     Financial Resource Strain: Low Risk  (2022)    Overall Financial Resource Strain (CARDIA)     Difficulty of Paying Living Expenses: Not hard at all   Food Insecurity: Unknown (2022)    Hunger Vital Sign     Worried About Running Out of Food in the Last Year: Never true   Transportation Needs: Unmet Transportation Needs (2022)    PRAPARE - Transportation     Lack of Transportation (Medical): Yes     Lack of Transportation (Non-Medical): Yes   Physical Activity: Insufficiently Active (2022)    Exercise Vital Sign     Days of Exercise per Week: 3 days     Minutes of Exercise per Session: 30 min   Stress: Stress Concern Present (2022)    [employer] - Occupational Stress Questionnaire     Feeling of Stress : To some extent   Social Connections: Moderately Isolated (2022)    Social Connection and Isolation Panel [NHANES]     Frequency of Communication with Friends and Family: More than three times a week     Frequency of Social Gatherings with Friends and Family: More than three times a week     Attends Mandaeism Services: Never     Active Member of Clubs or Organizations: Yes     Attends Club or Organization Meetings: More than 4 times per year     Marital Status:    Housing Stability: High Risk (2022)    Housing Stability Vital Sign     Unable to Pay for Housing in the Last Year: Yes     Number of Places Lived in the Last Year: 2     Unstable Housing in the Last Year: Yes     Past Medical History:   Diagnosis Date    Alcohol abuse     Quit 2022    Anticoagulant long-term use     Aortic aneurysm     Artery occlusion     Carotid artery occlusion     COPD (chronic obstructive  pulmonary disease)     no home O2    Coronary artery disease     Depression     Diabetes mellitus     Hypertension     Hypokalemia     Hypomagnesemia     SHANELLE (obstructive sleep apnea)     without cpap     Review of Systems   Constitutional:  Negative for activity change, appetite change, fatigue and unexpected weight change.   HENT:  Negative for dental problem and trouble swallowing.    Eyes:  Negative for visual disturbance.   Respiratory:  Negative for chest tightness and shortness of breath.    Cardiovascular:  Negative for chest pain, palpitations and leg swelling.   Gastrointestinal:  Negative for abdominal pain, constipation, diarrhea, nausea and vomiting.   Endocrine: Negative for polydipsia, polyphagia and polyuria.   Genitourinary:  Negative for difficulty urinating, dysuria, frequency and urgency.        Negative yeast infection   Musculoskeletal:  Negative for gait problem, myalgias and neck pain.   Integumentary:  Negative for rash and wound.   Allergic/Immunologic: Negative.    Neurological:  Negative for dizziness, syncope, weakness, numbness and headaches.        Both feet, intermittent tingling   Hematological: Negative.    Psychiatric/Behavioral:  Negative for confusion and sleep disturbance.    All other systems reviewed and are negative.     Objective:      Physical Exam  Vitals reviewed.   Constitutional:       Appearance: Normal appearance.   HENT:      Head: Normocephalic and atraumatic.   Eyes:      General: Vision grossly intact.      Extraocular Movements: Extraocular movements intact.      Pupils: Pupils are equal, round, and reactive to light.   Neck:      Thyroid: No thyroid mass or thyroid tenderness.   Cardiovascular:      Rate and Rhythm: Normal rate and regular rhythm.      Pulses: Normal pulses.           Radial pulses are 2+ on the right side and 2+ on the left side.        Dorsalis pedis pulses are 2+ on the right side and 2+ on the left side.      Heart sounds: Normal heart  sounds.   Pulmonary:      Effort: Pulmonary effort is normal.      Breath sounds: Normal breath sounds.   Abdominal:      General: Bowel sounds are normal.      Palpations: Abdomen is soft.   Genitourinary:     Comments: Deferred  Musculoskeletal:         General: Normal range of motion.      Cervical back: Normal range of motion and neck supple.      Right lower leg: No edema.      Left lower leg: No edema.      Right foot: No deformity or bunion.      Left foot: No deformity or bunion.   Feet:      Right foot:      Protective Sensation: 6 sites tested.  6 sites sensed.      Skin integrity: Callus present. No ulcer, skin breakdown or dry skin.      Toenail Condition: Right toenails are abnormally thick and long.      Left foot:      Protective Sensation: 6 sites tested.  6 sites sensed.      Skin integrity: Callus present. No ulcer, skin breakdown or dry skin.      Toenail Condition: Left toenails are abnormally thick and long.      Comments: Pinprick exam completed with 10-g monofilament. Vibration sensation intact. Fungal infection left foot, webspace #4, right foot, webspace #3, #4  Lymphadenopathy:      Cervical: No cervical adenopathy.   Skin:     General: Skin is warm and dry.      Findings: No rash or wound.      Comments: Negative for acanthosis nigricans. Well-healed SQ injection sites.   Neurological:      Mental Status: He is alert and oriented to person, place, and time.      Coordination: Coordination is intact.      Gait: Gait is intact.   Psychiatric:         Attention and Perception: Attention normal.         Mood and Affect: Mood normal.         Speech: Speech normal.         Behavior: Behavior normal. Behavior is cooperative.         Thought Content: Thought content normal.         Cognition and Memory: Cognition normal.         Assessment / Plan:     Diagnoses and all orders for this visit:    Type 2 diabetes mellitus with hyperglycemia, without long-term current use of insulin  -     POCT  Glucose, Hand-Held Device  -     Hemoglobin A1C; Future  -     tirzepatide (MOUNJARO) 2.5 mg/0.5 mL PnIj; Inject 2.5 mg into the skin every 7 days.  -     glimepiride (AMARYL) 4 MG tablet; Take 1 tablet (4 mg total) by mouth 2 (two) times daily before meals.  -     metFORMIN (GLUCOPHAGE) 1000 MG tablet; Take 1 tablet (1,000 mg total) by mouth 2 (two) times daily with meals.    Hypertension associated with diabetes    Hyperlipidemia associated with type 2 diabetes mellitus    Fungal infection of skin  -     ketoconazole (NIZORAL) 2 % cream; Apply topically 2 (two) times daily. To affected areas in between toes.    Additional Plan Details:  - Condition: Controlled, most recent A1C of 6.7% was completed 3 months ago.  - Monitor blood glucose:  3x daily.Goals reviewed. Maintain BG log.   - Hypoglycemia protocol: Reviewed and risk discussed.  Notify if BG readings below 80. Protocol printout provided.   - Meal Planning: Limit carbohydrate intake 30-45 grams/meal, 3x daily and 15 grams/snack, limit 2/day.   - Activity level: Recommend at least 150 minutes of exercise in a week.  - BP and LDL: Recommend lifestyle modifications and follow up with PCP for management.   - Medication Regimen:     Continue Metformin 1000 mg, 1 tablet twice daily with meals  Continue Glimepiride 4 mg, 1 tablet twice daily before meals  Begin taking Mounjaro 2.5 mg, inject into the skin once a week: discussed benefits, SE/AR    - Medication consideration: Started on GLP for cardiorenal benefits, up titrate as tolerated. Mounjaro pen teaching by provider during visit.  - Health Maintenance standards addressed today: A1c to be scheduled. Eye exam UTD with Dr. Gold, LV 10/2/23.  - Risks of uncontrolled DM explained. Importance of routine foot and eye exams reviewed. Scheduled as appropriate.  -The patient was explained the above plan and given opportunity to ask questions.  He understands, chooses and consents to this plan and accepts all the  risks, which include but are not limited to the risks mentioned above.   - Labs ordered as above.  - CDE follow up: Deferred   - Follow up: 3 months in clinic, [facility].       Charlotte T. Delacruz, FNP-C [facility]    A total of 30 minutes was spent in face to face time, of which over 50 % was spent in counseling patient on disease process, complications, treatment, and side effects of medications.

## 2024-01-08 NOTE — PATIENT INSTRUCTIONS
Medication Regimen:   Continue Metformin 1000 mg, 1 tablet twice daily with meals  Continue Glimepiride 4 mg, 1 tablet twice daily before meals  Begin taking Mounjaro 2.5 mg, inject into the skin once a week    Patient Instructions:  Carbohydrate Count: 30-45 grams/meal and 15 grams/snack with limit of 2 snacks per day.  Exercise: Goal is 150 minutes or more per week.  Bring meter and blood sugar log to each appointment.     Goals for Blood Sugar:  Fastin-130 mg/dl  2 hours after meal:  mg/dl  Before Bed: 100-150 mg/dl  If Blood sugar is below 70 mg/dl, DO NOT take diabetes medication. Eat first and then recheck blood sugar in 20 minutes.  Foods to eat if blood sugar is below 70 mg/dl  1/2 glass of orange juice   1/2 regular cola can   3-4 hard candies   3-4 small glucose tabs.   Foods to eat if blood sugar is below 50 mg/dl  1 glass of orange juice  1 regular cola can  8 hard candies  8 small glucose tabs.   If you have repeated eating/blood sugar recheck process 2 times and blood sugar still below 70 mg/dl, call 911.

## 2024-01-09 ENCOUNTER — PATIENT MESSAGE (OUTPATIENT)
Dept: DIABETES | Facility: CLINIC | Age: 69
End: 2024-01-09
Payer: MEDICARE

## 2024-01-09 DIAGNOSIS — E11.65 TYPE 2 DIABETES MELLITUS WITH HYPERGLYCEMIA, WITHOUT LONG-TERM CURRENT USE OF INSULIN: ICD-10-CM

## 2024-01-09 RX ORDER — TIRZEPATIDE 2.5 MG/.5ML
2.5 INJECTION, SOLUTION SUBCUTANEOUS
Qty: 12 PEN | Refills: 1 | Status: SHIPPED | OUTPATIENT
Start: 2024-01-09

## 2024-01-31 DIAGNOSIS — E11.65 TYPE 2 DIABETES MELLITUS WITH HYPERGLYCEMIA, WITHOUT LONG-TERM CURRENT USE OF INSULIN: ICD-10-CM

## 2024-02-05 ENCOUNTER — OFFICE VISIT (OUTPATIENT)
Dept: INTERNAL MEDICINE | Facility: CLINIC | Age: 69
End: 2024-02-05
Payer: MEDICARE

## 2024-02-05 DIAGNOSIS — E11.69 HYPERLIPIDEMIA ASSOCIATED WITH TYPE 2 DIABETES MELLITUS: Chronic | ICD-10-CM

## 2024-02-05 DIAGNOSIS — Z79.899 OTHER LONG TERM (CURRENT) DRUG THERAPY: ICD-10-CM

## 2024-02-05 DIAGNOSIS — I65.23 BILATERAL CAROTID ARTERY STENOSIS: Chronic | ICD-10-CM

## 2024-02-05 DIAGNOSIS — E78.5 HYPERLIPIDEMIA ASSOCIATED WITH TYPE 2 DIABETES MELLITUS: Chronic | ICD-10-CM

## 2024-02-05 DIAGNOSIS — J43.1 PANLOBULAR EMPHYSEMA: ICD-10-CM

## 2024-02-05 DIAGNOSIS — Z98.890 H/O CAROTID ENDARTERECTOMY: ICD-10-CM

## 2024-02-05 DIAGNOSIS — F17.200 CURRENT SMOKER: ICD-10-CM

## 2024-02-05 DIAGNOSIS — I15.2 HYPERTENSION ASSOCIATED WITH DIABETES: Chronic | ICD-10-CM

## 2024-02-05 DIAGNOSIS — E11.59 TYPE 2 DIABETES MELLITUS WITH OTHER CIRCULATORY COMPLICATION, WITHOUT LONG-TERM CURRENT USE OF INSULIN: Primary | ICD-10-CM

## 2024-02-05 DIAGNOSIS — E11.59 HYPERTENSION ASSOCIATED WITH DIABETES: Chronic | ICD-10-CM

## 2024-02-05 DIAGNOSIS — I50.32 CHRONIC DIASTOLIC (CONGESTIVE) HEART FAILURE: ICD-10-CM

## 2024-02-05 DIAGNOSIS — F32.89 OTHER DEPRESSION: ICD-10-CM

## 2024-02-05 DIAGNOSIS — I73.9 PAD (PERIPHERAL ARTERY DISEASE): Chronic | ICD-10-CM

## 2024-02-05 DIAGNOSIS — I25.118 CORONARY ARTERY DISEASE OF NATIVE ARTERY OF NATIVE HEART WITH STABLE ANGINA PECTORIS: Chronic | ICD-10-CM

## 2024-02-05 DIAGNOSIS — G62.9 NEUROPATHY: Chronic | ICD-10-CM

## 2024-02-05 DIAGNOSIS — I70.218 CLAUDICATION OF BOTH UPPER EXTREMITIES DUE TO ATHEROSCLEROSIS: ICD-10-CM

## 2024-02-05 PROCEDURE — 99215 OFFICE O/P EST HI 40 MIN: CPT | Mod: 95,,, | Performed by: FAMILY MEDICINE

## 2024-02-05 RX ORDER — CITALOPRAM 20 MG/1
20 TABLET, FILM COATED ORAL DAILY
Qty: 90 TABLET | Refills: 3 | Status: SHIPPED | OUTPATIENT
Start: 2024-02-05

## 2024-02-05 NOTE — PROGRESS NOTES
Subjective:   Patient ID: Lamine Fong is a 68 y.o. male.  Chief Complaint:  No chief complaint on file.    The patient location is: Home  The chief complaint leading to consultation is: Follow Up Visit    Visit type: audiovisual    Face to Face time with patient: 30 minutes  40 minutes of total time spent on the encounter, which includes face to face time and non-face to face time preparing to see the patient (eg, review of tests), Obtaining and/or reviewing separately obtained history, Documenting clinical information in the electronic or other health record, Independently interpreting results (not separately reported) and communicating results to the patient/family/caregiver, or Care coordination (not separately reported).     Each patient to whom he or she provides medical services by telemedicine is:  (1) informed of the relationship between the physician and patient and the respective role of any other health care provider with respect to management of the patient; and (2) notified that he or she may decline to receive medical services by telemedicine and may withdraw from such care at any time.    Medical history:  - Diabetes mellitus.  Also followed by diabetes management.  A1c September 2023 6.7% microalbumin negative.  On Amaryl 4 mg twice a day metformin 1000 mg twice a day.  Last month started Mounjaro 2.5 mg weekly injections.  Reports compliance.  No increased gallbladder pain or symptoms with history of stones.  Has had chronic diarrhea likely related to metformin 4 months.  Denies any symptoms hypo or hyperglycemia.  - Hypertension with stable chronic diastolic heart failure an abdominal aortic aneurysm.  Controlled.  Followed by Cardiology.  On amlodipine 10 mg daily, Toprol-XL 25 mg daily, and losartan 50 mg daily.  Reports compliance.  Denies side effects.  No orthostasis.  No shortness of breath.    - Hyperlipidemia with peripheral vascular disease, coronary artery disease, and bilateral  carotid artery stenosis.  Previous CMP with normal kidney, liver, glucose, electrolytes.  September 2023 LDL not at goal.  On aspirin 81 mg daily and simvastatin 20 mg daily and Pletal 100 mg twice a day.  Reports compliance denies side effects.  Was recommended to start Repatha 140 mg injection every 2 weeks, but patient never started medication.  Currently denies any chest pain or claudication.  - Neuropathy.  Multifactorial.  On gabapentin 300 mg 3 tablets nightly and multivitamin with B12 supplementation.  No longer on folic acid supplementation.  Pain pattern stable.  - History of alcohol abuse.  Continues to remain alcohol free.  - Depression. On citalopram 20 mg daily.  Higher dose not recommended due to underlying cardiac disease.  Reports compliance.  Denies side effects.  States mood and symptoms currently stable and well controlled.  Happy with medication.  Wants to continue.  - Chronic sun damaged skin.  Saw dermatology.  Basal skin cell skin cancer removed.  Treated with Efudex.  - GERD.  Stable on Protonix 40 mg daily.  - Cholelithiasis.  No cholecystitis.  No increased abdominal pain with GLP1 use.  Chronic diarrhea likely metformin related.  - Emphysema.  Stable.  No frequent rescue inhaler use.  No controller medication needed.    -smoker.  Overdue for low-dose CT lung cancer screening.      Other than chronic loose stools, no additional complaints today      Review of Systems   Constitutional:  Negative for activity change and unexpected weight change.   HENT:  Negative for hearing loss, rhinorrhea and trouble swallowing.    Eyes:  Negative for discharge and visual disturbance.   Respiratory:  Negative for chest tightness and wheezing.    Cardiovascular:  Negative for chest pain and palpitations.   Gastrointestinal:  Positive for diarrhea. Negative for blood in stool, constipation and vomiting.   Endocrine: Negative for polydipsia and polyuria.   Genitourinary:  Negative for difficulty urinating,  hematuria and urgency.   Musculoskeletal:  Negative for arthralgias, joint swelling and neck pain.   Neurological:  Negative for weakness and headaches.   Psychiatric/Behavioral:  Negative for confusion and dysphoric mood.      Objective:     Physical Exam  Constitutional:       Appearance: Normal appearance.   Psychiatric:         Mood and Affect: Mood and affect normal.       Assessment:       ICD-10-CM ICD-9-CM   1. Type 2 diabetes mellitus with other circulatory complication, without long-term current use of insulin  E11.59 250.70   2. Hypertension associated with diabetes  E11.59 250.80    I15.2 401.9   3. Chronic diastolic (congestive) heart failure  I50.32 428.32     428.0   4. Hyperlipidemia associated with type 2 diabetes mellitus  E11.69 250.80    E78.5 272.4   5. Coronary artery disease of native artery of native heart with stable angina pectoris  I25.118 414.01     413.9   6. Bilateral carotid artery stenosis  I65.23 433.10     433.30   7. H/O carotid endarterectomy  Z98.890 V45.89   8. PAD (peripheral artery disease)  I73.9 443.9   9. Claudication of both upper extremities due to atherosclerosis  I70.218 440.21   10. Other depression  F32.89 311   11. Other long term (current) drug therapy  Z79.899 V58.69   12. Neuropathy  G62.9 355.9   13. Panlobular emphysema  J43.1 492.8     Plan:   Type 2 diabetes mellitus with other circulatory complication, without long-term current use of insulin  -     Hemoglobin A1C; Future; Expected date: 02/05/2024  Recheck A1c  If less than 7%, decreased metformin 500 mg twice a day  Continue Mounjaro 2.5 mg weekly injection  Continue Amaryl 4 mg twice a day   Eye exam up-to-date  Microalbumin up-to-date  Foot exam up-to-date    Hypertension associated with diabetes  Chronic diastolic (congestive) heart failure  Controlled.  Stable.  Asymptomatic.  Last BP at goal.    Continue Toprol-XL 25 mg daily   Continue amlodipine 10 mg daily  Continue losartan 50 mg daily      Hyperlipidemia associated with type 2 diabetes mellitus  Coronary artery disease of native artery of native heart with stable angina pectoris  Bilateral carotid artery stenosis  H/O carotid endarterectomy  PAD (peripheral artery disease)  Claudication of both upper extremities due to atherosclerosis  -     Lipid Panel; Future; Expected date: 02/05/2024  Asymptomatic  Last LDL not at goal   Recheck lipid panel today   Continue aspirin 81 mg daily  Continue Pletal 100 mg twice a day  Continue simvastatin 20 mg daily   Restart Repatha 140 mg weekly injections if LDL greater than 70   Follow-up cardiology as scheduled   Follow-up vascular surgery as scheduled    Other depression  -     citalopram (CELEXA) 20 MG tablet; Take 1 tablet (20 mg total) by mouth once daily.  Dispense: 90 tablet; Refill: 3  Stable, no side effects, mood and symptoms well controlled on present medication .  Continue current medication    Neuropathy  -     Vitamin B12; Future; Expected date: 02/05/2024  Stable   Continue gabapentin 300 mg 3 tablets daily   Continue B12 supplementation if indicated     Panlobular emphysema  Current smoker  No chronic cough or shortness breaath   No frequent rescue inhaler use   No controller medication  Not interested in cessation   Will eventually need to schedule low-dose CT lung cancer screening before the end of the year     Follow up with any/all specialists as scheduled   Return to clinic 6 months for annual physical exam   Visit needs to be face-to-face not telemedicine     Patient and son expressed agreement and understanding the above plan    40+ minutes of total time spent on the encounter, which includes face to face time and non-face to face time preparing to see the patient (eg, review of tests), Obtaining and/or reviewing separately obtained history, documenting clinical information in the electronic or other health record, independently interpreting results (not separately reported) and  communicating results to the patient/family/caregiver, or Care coordination (not separately reported).

## 2024-02-06 DIAGNOSIS — Z12.11 COLON CANCER SCREENING: ICD-10-CM

## 2024-03-25 ENCOUNTER — LAB VISIT (OUTPATIENT)
Dept: LAB | Facility: HOSPITAL | Age: 69
End: 2024-03-25
Attending: FAMILY MEDICINE
Payer: MEDICARE

## 2024-03-25 DIAGNOSIS — I25.118 CORONARY ARTERY DISEASE OF NATIVE ARTERY OF NATIVE HEART WITH STABLE ANGINA PECTORIS: Chronic | ICD-10-CM

## 2024-03-25 DIAGNOSIS — I70.218 CLAUDICATION OF BOTH UPPER EXTREMITIES DUE TO ATHEROSCLEROSIS: ICD-10-CM

## 2024-03-25 DIAGNOSIS — Z98.890 H/O CAROTID ENDARTERECTOMY: ICD-10-CM

## 2024-03-25 DIAGNOSIS — I73.9 PAD (PERIPHERAL ARTERY DISEASE): Chronic | ICD-10-CM

## 2024-03-25 DIAGNOSIS — I65.23 BILATERAL CAROTID ARTERY STENOSIS: Chronic | ICD-10-CM

## 2024-03-25 DIAGNOSIS — E11.69 HYPERLIPIDEMIA ASSOCIATED WITH TYPE 2 DIABETES MELLITUS: Chronic | ICD-10-CM

## 2024-03-25 DIAGNOSIS — E78.5 HYPERLIPIDEMIA ASSOCIATED WITH TYPE 2 DIABETES MELLITUS: Chronic | ICD-10-CM

## 2024-03-25 LAB
CHOLEST SERPL-MCNC: 137 MG/DL (ref 120–199)
CHOLEST/HDLC SERPL: 5.1 {RATIO} (ref 2–5)
HDLC SERPL-MCNC: 27 MG/DL (ref 40–75)
HDLC SERPL: 19.7 % (ref 20–50)
LDLC SERPL CALC-MCNC: 88.8 MG/DL (ref 63–159)
NONHDLC SERPL-MCNC: 110 MG/DL
TRIGL SERPL-MCNC: 106 MG/DL (ref 30–150)

## 2024-03-25 PROCEDURE — 80061 LIPID PANEL: CPT | Performed by: FAMILY MEDICINE

## 2024-03-25 PROCEDURE — 36415 COLL VENOUS BLD VENIPUNCTURE: CPT | Performed by: FAMILY MEDICINE

## 2024-03-26 ENCOUNTER — PATIENT MESSAGE (OUTPATIENT)
Dept: INTERNAL MEDICINE | Facility: CLINIC | Age: 69
End: 2024-03-26
Payer: MEDICARE

## 2024-05-03 ENCOUNTER — PATIENT MESSAGE (OUTPATIENT)
Dept: INTERNAL MEDICINE | Facility: CLINIC | Age: 69
End: 2024-05-03
Payer: MEDICARE

## 2024-05-06 ENCOUNTER — OFFICE VISIT (OUTPATIENT)
Dept: CARDIOLOGY | Facility: CLINIC | Age: 69
End: 2024-05-06
Payer: MEDICARE

## 2024-05-06 ENCOUNTER — LAB VISIT (OUTPATIENT)
Dept: LAB | Facility: HOSPITAL | Age: 69
End: 2024-05-06
Attending: FAMILY MEDICINE
Payer: MEDICARE

## 2024-05-06 VITALS
WEIGHT: 246.94 LBS | HEART RATE: 60 BPM | BODY MASS INDEX: 30.06 KG/M2 | SYSTOLIC BLOOD PRESSURE: 132 MMHG | OXYGEN SATURATION: 94 % | DIASTOLIC BLOOD PRESSURE: 74 MMHG

## 2024-05-06 DIAGNOSIS — I70.218 CLAUDICATION OF BOTH UPPER EXTREMITIES DUE TO ATHEROSCLEROSIS: ICD-10-CM

## 2024-05-06 DIAGNOSIS — I15.2 HYPERTENSION ASSOCIATED WITH DIABETES: Chronic | ICD-10-CM

## 2024-05-06 DIAGNOSIS — E78.5 HYPERLIPIDEMIA ASSOCIATED WITH TYPE 2 DIABETES MELLITUS: Primary | Chronic | ICD-10-CM

## 2024-05-06 DIAGNOSIS — E11.59 TYPE 2 DIABETES MELLITUS WITH OTHER CIRCULATORY COMPLICATION, WITHOUT LONG-TERM CURRENT USE OF INSULIN: ICD-10-CM

## 2024-05-06 DIAGNOSIS — I72.3 ANEURYSM OF LEFT ILIAC ARTERY: ICD-10-CM

## 2024-05-06 DIAGNOSIS — E11.59 HYPERTENSION ASSOCIATED WITH DIABETES: Chronic | ICD-10-CM

## 2024-05-06 DIAGNOSIS — I25.118 CORONARY ARTERY DISEASE OF NATIVE ARTERY OF NATIVE HEART WITH STABLE ANGINA PECTORIS: Chronic | ICD-10-CM

## 2024-05-06 DIAGNOSIS — I95.1 ORTHOSTATIC SYNCOPE: ICD-10-CM

## 2024-05-06 DIAGNOSIS — G62.9 NEUROPATHY: Chronic | ICD-10-CM

## 2024-05-06 DIAGNOSIS — Z79.899 OTHER LONG TERM (CURRENT) DRUG THERAPY: ICD-10-CM

## 2024-05-06 DIAGNOSIS — I50.32 CHRONIC DIASTOLIC (CONGESTIVE) HEART FAILURE: ICD-10-CM

## 2024-05-06 DIAGNOSIS — I71.43 INFRARENAL ABDOMINAL AORTIC ANEURYSM (AAA) WITHOUT RUPTURE: ICD-10-CM

## 2024-05-06 DIAGNOSIS — I65.23 BILATERAL CAROTID ARTERY STENOSIS: Chronic | ICD-10-CM

## 2024-05-06 DIAGNOSIS — Z98.890 H/O CAROTID ENDARTERECTOMY: ICD-10-CM

## 2024-05-06 DIAGNOSIS — I73.9 PAD (PERIPHERAL ARTERY DISEASE): Chronic | ICD-10-CM

## 2024-05-06 DIAGNOSIS — E11.69 HYPERLIPIDEMIA ASSOCIATED WITH TYPE 2 DIABETES MELLITUS: Primary | Chronic | ICD-10-CM

## 2024-05-06 LAB
ALBUMIN SERPL BCP-MCNC: 3.9 G/DL (ref 3.5–5.2)
ALP SERPL-CCNC: 93 U/L (ref 55–135)
ALT SERPL W/O P-5'-P-CCNC: 11 U/L (ref 10–44)
ANION GAP SERPL CALC-SCNC: 9 MMOL/L (ref 8–16)
AST SERPL-CCNC: 14 U/L (ref 10–40)
BILIRUB SERPL-MCNC: 0.5 MG/DL (ref 0.1–1)
BUN SERPL-MCNC: 12 MG/DL (ref 8–23)
CALCIUM SERPL-MCNC: 9.6 MG/DL (ref 8.7–10.5)
CHLORIDE SERPL-SCNC: 101 MMOL/L (ref 95–110)
CO2 SERPL-SCNC: 28 MMOL/L (ref 23–29)
CREAT SERPL-MCNC: 1.1 MG/DL (ref 0.5–1.4)
ERYTHROCYTE [DISTWIDTH] IN BLOOD BY AUTOMATED COUNT: 14.9 % (ref 11.5–14.5)
EST. GFR  (NO RACE VARIABLE): >60 ML/MIN/1.73 M^2
ESTIMATED AVG GLUCOSE: 114 MG/DL (ref 68–131)
GLUCOSE SERPL-MCNC: 91 MG/DL (ref 70–110)
HBA1C MFR BLD: 5.6 % (ref 4–5.6)
HCT VFR BLD AUTO: 49.4 % (ref 40–54)
HGB BLD-MCNC: 15.4 G/DL (ref 14–18)
MCH RBC QN AUTO: 25.9 PG (ref 27–31)
MCHC RBC AUTO-ENTMCNC: 31.2 G/DL (ref 32–36)
MCV RBC AUTO: 83 FL (ref 82–98)
PLATELET # BLD AUTO: 237 K/UL (ref 150–450)
PMV BLD AUTO: 9.8 FL (ref 9.2–12.9)
POTASSIUM SERPL-SCNC: 4.5 MMOL/L (ref 3.5–5.1)
PROT SERPL-MCNC: 7.7 G/DL (ref 6–8.4)
RBC # BLD AUTO: 5.94 M/UL (ref 4.6–6.2)
SODIUM SERPL-SCNC: 138 MMOL/L (ref 136–145)
VIT B12 SERPL-MCNC: 309 PG/ML (ref 210–950)
WBC # BLD AUTO: 7.02 K/UL (ref 3.9–12.7)

## 2024-05-06 PROCEDURE — 99214 OFFICE O/P EST MOD 30 MIN: CPT | Mod: S$PBB,,, | Performed by: INTERNAL MEDICINE

## 2024-05-06 PROCEDURE — 36415 COLL VENOUS BLD VENIPUNCTURE: CPT | Performed by: FAMILY MEDICINE

## 2024-05-06 PROCEDURE — 99214 OFFICE O/P EST MOD 30 MIN: CPT | Mod: PBBFAC | Performed by: INTERNAL MEDICINE

## 2024-05-06 PROCEDURE — 83036 HEMOGLOBIN GLYCOSYLATED A1C: CPT | Performed by: FAMILY MEDICINE

## 2024-05-06 PROCEDURE — 85027 COMPLETE CBC AUTOMATED: CPT | Performed by: FAMILY MEDICINE

## 2024-05-06 PROCEDURE — 99999 PR PBB SHADOW E&M-EST. PATIENT-LVL IV: CPT | Mod: PBBFAC,,, | Performed by: INTERNAL MEDICINE

## 2024-05-06 PROCEDURE — 80053 COMPREHEN METABOLIC PANEL: CPT | Performed by: FAMILY MEDICINE

## 2024-05-06 PROCEDURE — 82607 VITAMIN B-12: CPT | Performed by: FAMILY MEDICINE

## 2024-05-06 RX ORDER — METOPROLOL SUCCINATE 25 MG/1
TABLET, EXTENDED RELEASE ORAL
Qty: 30 TABLET | Refills: 11 | Status: SHIPPED | OUTPATIENT
Start: 2024-05-06

## 2024-05-06 RX ORDER — CILOSTAZOL 100 MG/1
100 TABLET ORAL 2 TIMES DAILY
Qty: 60 TABLET | Refills: 11 | Status: SHIPPED | OUTPATIENT
Start: 2024-05-06

## 2024-05-06 RX ORDER — EVOLOCUMAB 140 MG/ML
140 INJECTION, SOLUTION SUBCUTANEOUS
Qty: 2 EACH | Refills: 0 | Status: ACTIVE | OUTPATIENT
Start: 2024-05-06 | End: 2024-06-06 | Stop reason: SDUPTHER

## 2024-05-06 NOTE — PROGRESS NOTES
Subjective:   Patient ID:  Lamine Fong is a 69 y.o. male who presents for follow up of No chief complaint on file.      70 yo male came in for 6 months f/u and preop clearance  PMH CAD h/o C showed distal LCX , orthostatic hypotension. DM > 5 yrs former heavy drinker and smoker, coronary atherosclerosis. PAD, AAA 5 cm, left iliac aneurysm, and carotid artery Dz s/p left CEA by Dr. Fletcher  In , c/o+ postaural dizziness when taking amlodipine, metorpolol and clonidine.   Now on amlodipine only for HTN and dizziness resolved.   Drinking issue on and off for 30 years. Pint vodka and beers daily, Off alcohol for 30 days,   Smoker 40 yrs 1ppd and quit 2 months ago, now vaping and patch   echo biv function;  Carotid US There is less than 50% diameter reduction on the right. There is 50-69% stenosis within the left proximal and mid ICA  Brain mild microvascular Dz   abd CT Coronary and aortic calcific atherosclerosis are noted. AAA 4.6 cm infra renal   Today EKG NSR    Good appetite, weight gain   No SOB chest pain dizziness faint   Claudication if walking a lot     LE arterial US showed bilaetral SAF pcclued and ABIs 0.54  MPI showed possible apical ischemia and normal EF  Leg calf muscle pain after walking for 10 min. No rest pain. The feet normal color and warm. PT DP pulse weak  Off Metoprolol deu to bradycardia,.   Added lipitor     visit  H/o C showed distal LCx 100% lesion and collateral circulation to LPDA. Continue medical Rx.  PAD with exertional claudication.  Right groin stable  No chest pain, leg swelling      visit  Leg pain dtarted from the knee and calf muscle, worse after walking. Has the pain at rest. No change after added Pletal. No leg and feet ulcer. F/u at podiatry  No chest pain. Chronic SOB. No palpitation dizziness faint     03/23 visit  Facial numbness resolved after left carotid CEA.   Plan to have left leg fem bypass and then right leg bypass.  AAA procedure in the future  No chest pain dizziness palpitation and leg swelling   SOB mild, quit smoking     10/23 visit  S/p bilateral fem bypass by Dr. Fletcher at [facility] in .   Now improved walking. Cramp resolved. Remains peripheral neuropathy.   No chest pain, dizziness palpitation orthopnea and PND. No active bleeding   Some feet swelling  Ekg NSR PAcs  AAA stenting pending    24 failed to deliver repatha per [facility]    Interval history  No chest pain dyspnea faint  LDL 89. No smoking no falling        Past Medical History:   Diagnosis Date    Alcohol abuse     Quit 2022    Anticoagulant long-term use     Aortic aneurysm     Artery occlusion     Carotid artery occlusion     COPD (chronic obstructive pulmonary disease)     no home O2    Coronary artery disease     Depression     Diabetes mellitus     Hypertension     Hypokalemia     Hypomagnesemia     SHANELLE (obstructive sleep apnea)     without cpap       Past Surgical History:   Procedure Laterality Date    ANGIOGRAPHY OF LOWER EXTREMITY Right 2023    Procedure: Angiogram Extremity Unilateral/ right leg;  Surgeon: Bharat Fletcher IV, MD;  Location: [facility] CATH LAB;  Service: Cardiovascular;  Laterality: Right;    CAROTID ENDARTERECTOMY Left 2022    Procedure: ENDARTERECTOMY-CAROTID;  Surgeon: Bharat Fletcher IV, MD;  Location: [facility] OR;  Service: Cardiovascular;  Laterality: Left;    HERNIA REPAIR      umbilical    LEFT HEART CATHETERIZATION Left 2022    Procedure: CATHETERIZATION, HEART, LEFT;  Surgeon: Jadiel Simons MD;  Location: [facility] CATH LAB;  Service: Cardiology;  Laterality: Left;       Social History     Tobacco Use    Smoking status: Every Day     Current packs/day: 0.00     Average packs/day: 1 pack/day for 53.9 years (53.9 ttl pk-yrs)     Types: Cigarettes, Vaping with nicotine     Start date: 1968     Last attempt to quit: 2021     Years since quittin.4    Smokeless tobacco: Current   Substance Use Topics    Alcohol  use: Not Currently     Comment: History of alcoholism    Drug use: No       Family History   Problem Relation Name Age of Onset    Diabetes Mellitus Father Matt Fong     Diabetes Father Matt Fong     Heart disease Father Matt Fong     Diabetes Mother Richelle Fong     Heart disease Brother Edin HIGHTOWER    Objective:   Physical Exam  HENT:      Head: Normocephalic.   Eyes:      Pupils: Pupils are equal, round, and reactive to light.   Neck:      Thyroid: No thyromegaly.      Vascular: Normal carotid pulses. No carotid bruit or JVD.   Cardiovascular:      Rate and Rhythm: Normal rate and regular rhythm. No extrasystoles are present.     Chest Wall: PMI is not displaced.      Pulses: Normal pulses.      Heart sounds: Normal heart sounds. No murmur heard.     No gallop. No S3 sounds.   Pulmonary:      Effort: No respiratory distress.      Breath sounds: Normal breath sounds. No stridor.   Abdominal:      General: Bowel sounds are normal.      Palpations: Abdomen is soft.      Tenderness: There is no abdominal tenderness. There is no rebound.   Skin:     Findings: No rash.   Neurological:      Mental Status: He is alert and oriented to person, place, and time.   Psychiatric:         Behavior: Behavior normal.         Lab Results   Component Value Date    CHOL 137 03/25/2024    CHOL 181 09/12/2023    CHOL 123 10/02/2021     Lab Results   Component Value Date    HDL 27 (L) 03/25/2024    HDL 28 (L) 09/12/2023    HDL 47 10/02/2021     Lab Results   Component Value Date    LDLCALC 88.8 03/25/2024    LDLCALC 135.6 09/12/2023    LDLCALC 63.6 10/02/2021     Lab Results   Component Value Date    TRIG 106 03/25/2024    TRIG 87 09/12/2023    TRIG 62 10/02/2021     Lab Results   Component Value Date    CHOLHDL 19.7 (L) 03/25/2024    CHOLHDL 15.5 (L) 09/12/2023    CHOLHDL 38.2 10/02/2021       Chemistry        Component Value Date/Time     (L) 01/23/2023 1109    K 4.4 01/23/2023 1109      01/23/2023 1109    CO2 23 01/23/2023 1109    BUN 17 01/23/2023 1109    CREATININE 1.2 01/23/2023 1109     (H) 01/23/2023 1109        Component Value Date/Time    CALCIUM 9.5 01/23/2023 1109    ALKPHOS 79 09/28/2022 0346    AST 12 09/28/2022 0346    ALT 16 09/28/2022 0346    BILITOT 0.4 09/28/2022 0346    ESTGFRAFRICA >60 07/30/2022 0840    ESTGFRAFRICA >60 07/30/2022 0840    EGFRNONAA >60 07/30/2022 0840    EGFRNONAA >60 07/30/2022 0840          Lab Results   Component Value Date    HGBA1C 6.7 (H) 09/12/2023    HGBA1C 6.7 (H) 09/12/2023     Lab Results   Component Value Date    TSH 1.750 10/03/2021     Lab Results   Component Value Date    INR 1.0 01/23/2023    INR 0.9 02/28/2022     Lab Results   Component Value Date    WBC 8.93 01/23/2023    HGB 15.3 01/23/2023    HCT 45.7 01/23/2023    MCV 81 (L) 01/23/2023     01/23/2023     BMP  Sodium   Date Value Ref Range Status   01/23/2023 132 (L) 136 - 145 mmol/L Final     Potassium   Date Value Ref Range Status   01/23/2023 4.4 3.5 - 5.1 mmol/L Final     Chloride   Date Value Ref Range Status   01/23/2023 100 95 - 110 mmol/L Final     CO2   Date Value Ref Range Status   01/23/2023 23 23 - 29 mmol/L Final     BUN   Date Value Ref Range Status   01/23/2023 17 8 - 23 mg/dL Final     Creatinine   Date Value Ref Range Status   01/23/2023 1.2 0.5 - 1.4 mg/dL Final     Calcium   Date Value Ref Range Status   01/23/2023 9.5 8.7 - 10.5 mg/dL Final     Anion Gap   Date Value Ref Range Status   01/23/2023 9 8 - 16 mmol/L Final     eGFR if    Date Value Ref Range Status   07/30/2022 >60 >60 mL/min/1.73 m^2 Final   07/30/2022 >60 >60 mL/min/1.73 m^2 Final     eGFR if non    Date Value Ref Range Status   07/30/2022 >60 >60 mL/min/1.73 m^2 Final     Comment:     Calculation used to obtain the estimated glomerular filtration  rate (eGFR) is the CKD-EPI equation.      07/30/2022 >60 >60 mL/min/1.73 m^2 Final     Comment:     Calculation used  to obtain the estimated glomerular filtration  rate (eGFR) is the CKD-EPI equation.        BNP  @LABRCNTIP(BNP,BNPTRIAGEBLO)@  @LABRCNTIP(troponini)@  CrCl cannot be calculated (Patient's most recent lab result is older than the maximum 7 days allowed.).  No results found in the last 24 hours.  No results found in the last 24 hours.  No results found in the last 24 hours.    Assessment:      1. Hyperlipidemia associated with type 2 diabetes mellitus    2. Infrarenal abdominal aortic aneurysm (AAA) without rupture    3. Aneurysm of left iliac artery    4. Bilateral carotid artery stenosis    5. Chronic diastolic (congestive) heart failure    6. Claudication of both upper extremities due to atherosclerosis    7. Coronary artery disease of native artery of native heart with stable angina pectoris    8. H/O carotid endarterectomy    9. Hypertension associated with diabetes    10. Orthostatic syncope    11. PAD (peripheral artery disease)        Plan:   Resume repatha after new insurance  Continue asa amlodipine pletal losartan toprolXL statin  On moujaro rx  DM Rx per PCP  Counseled DASH  Check Lipid profile with PCP in 6 months  Recommend heart-healthy diet, weight control and regular exercise.  Melva. Risk modification.   I have reviewed all pertinent labs and cardiac studies independently. Plans and recommendations have been formulated under my direct supervision. All questions answered and patient voiced understanding.   If symptoms persist go to the ED  RTC in 6 months

## 2024-06-06 DIAGNOSIS — E11.69 HYPERLIPIDEMIA ASSOCIATED WITH TYPE 2 DIABETES MELLITUS: Chronic | ICD-10-CM

## 2024-06-06 DIAGNOSIS — E78.5 HYPERLIPIDEMIA ASSOCIATED WITH TYPE 2 DIABETES MELLITUS: Chronic | ICD-10-CM

## 2024-06-06 RX ORDER — EVOLOCUMAB 140 MG/ML
140 INJECTION, SOLUTION SUBCUTANEOUS
Qty: 2 EACH | Refills: 11 | Status: ACTIVE | OUTPATIENT
Start: 2024-06-06

## 2024-06-17 RX ORDER — LOSARTAN POTASSIUM 50 MG/1
TABLET ORAL
Qty: 90 TABLET | Refills: 3 | Status: SHIPPED | OUTPATIENT
Start: 2024-06-17

## 2024-06-24 ENCOUNTER — PATIENT MESSAGE (OUTPATIENT)
Dept: INTERNAL MEDICINE | Facility: CLINIC | Age: 69
End: 2024-06-24
Payer: MEDICARE

## 2024-07-03 ENCOUNTER — PATIENT MESSAGE (OUTPATIENT)
Dept: INTERNAL MEDICINE | Facility: CLINIC | Age: 69
End: 2024-07-03
Payer: MEDICARE

## 2024-07-03 DIAGNOSIS — E11.65 TYPE 2 DIABETES MELLITUS WITH HYPERGLYCEMIA, WITHOUT LONG-TERM CURRENT USE OF INSULIN: ICD-10-CM

## 2024-07-03 NOTE — TELEPHONE ENCOUNTER
No care due was identified.  Health [facility] Embedded Care Due Messages. Reference number: [id number].   7/03/2024 8:44:36 AM CDT

## 2024-07-08 RX ORDER — TIRZEPATIDE 2.5 MG/.5ML
2.5 INJECTION, SOLUTION SUBCUTANEOUS
Qty: 12 PEN | Refills: 3 | Status: SHIPPED | OUTPATIENT
Start: 2024-07-08

## 2024-08-16 DIAGNOSIS — I25.118 CORONARY ARTERY DISEASE OF NATIVE ARTERY OF NATIVE HEART WITH STABLE ANGINA PECTORIS: ICD-10-CM

## 2024-08-16 RX ORDER — AMLODIPINE BESYLATE 10 MG/1
10 TABLET ORAL
Qty: 90 TABLET | Refills: 3 | Status: SHIPPED | OUTPATIENT
Start: 2024-08-16

## 2024-09-30 ENCOUNTER — OFFICE VISIT (OUTPATIENT)
Dept: INTERNAL MEDICINE | Facility: CLINIC | Age: 69
End: 2024-09-30
Payer: MEDICARE

## 2024-09-30 ENCOUNTER — LAB VISIT (OUTPATIENT)
Dept: LAB | Facility: HOSPITAL | Age: 69
End: 2024-09-30
Attending: FAMILY MEDICINE
Payer: MEDICARE

## 2024-09-30 VITALS
BODY MASS INDEX: 31.9 KG/M2 | DIASTOLIC BLOOD PRESSURE: 70 MMHG | WEIGHT: 261.94 LBS | HEART RATE: 51 BPM | TEMPERATURE: 98 F | SYSTOLIC BLOOD PRESSURE: 130 MMHG | OXYGEN SATURATION: 96 % | HEIGHT: 76 IN

## 2024-09-30 DIAGNOSIS — Z98.890 H/O CAROTID ENDARTERECTOMY: ICD-10-CM

## 2024-09-30 DIAGNOSIS — E11.59 HYPERTENSION ASSOCIATED WITH DIABETES: ICD-10-CM

## 2024-09-30 DIAGNOSIS — I15.2 HYPERTENSION ASSOCIATED WITH DIABETES: ICD-10-CM

## 2024-09-30 DIAGNOSIS — I70.218 CLAUDICATION OF BOTH UPPER EXTREMITIES DUE TO ATHEROSCLEROSIS: ICD-10-CM

## 2024-09-30 DIAGNOSIS — J43.1 PANLOBULAR EMPHYSEMA: ICD-10-CM

## 2024-09-30 DIAGNOSIS — I50.32 CHRONIC DIASTOLIC (CONGESTIVE) HEART FAILURE: ICD-10-CM

## 2024-09-30 DIAGNOSIS — Z00.00 ANNUAL PHYSICAL EXAM: Primary | ICD-10-CM

## 2024-09-30 DIAGNOSIS — E11.69 HYPERLIPIDEMIA ASSOCIATED WITH TYPE 2 DIABETES MELLITUS: ICD-10-CM

## 2024-09-30 DIAGNOSIS — G62.9 NEUROPATHY: ICD-10-CM

## 2024-09-30 DIAGNOSIS — R19.7 DIARRHEA, UNSPECIFIED TYPE: ICD-10-CM

## 2024-09-30 DIAGNOSIS — I65.23 BILATERAL CAROTID ARTERY STENOSIS: ICD-10-CM

## 2024-09-30 DIAGNOSIS — Z87.891 FORMER SMOKER: ICD-10-CM

## 2024-09-30 DIAGNOSIS — I73.9 PAD (PERIPHERAL ARTERY DISEASE): ICD-10-CM

## 2024-09-30 DIAGNOSIS — E11.59 TYPE 2 DIABETES MELLITUS WITH OTHER CIRCULATORY COMPLICATION, WITHOUT LONG-TERM CURRENT USE OF INSULIN: ICD-10-CM

## 2024-09-30 DIAGNOSIS — Z23 NEED FOR INFLUENZA VACCINATION: ICD-10-CM

## 2024-09-30 DIAGNOSIS — F32.89 OTHER DEPRESSION: ICD-10-CM

## 2024-09-30 DIAGNOSIS — Z00.00 ANNUAL PHYSICAL EXAM: ICD-10-CM

## 2024-09-30 DIAGNOSIS — I25.118 CORONARY ARTERY DISEASE OF NATIVE ARTERY OF NATIVE HEART WITH STABLE ANGINA PECTORIS: ICD-10-CM

## 2024-09-30 DIAGNOSIS — E78.5 HYPERLIPIDEMIA ASSOCIATED WITH TYPE 2 DIABETES MELLITUS: ICD-10-CM

## 2024-09-30 DIAGNOSIS — E66.811 OBESITY (BMI 30.0-34.9): ICD-10-CM

## 2024-09-30 DIAGNOSIS — Z79.899 OTHER LONG TERM (CURRENT) DRUG THERAPY: ICD-10-CM

## 2024-09-30 DIAGNOSIS — Z23 NEED FOR PNEUMOCOCCAL VACCINATION: ICD-10-CM

## 2024-09-30 DIAGNOSIS — K21.9 GASTROESOPHAGEAL REFLUX DISEASE WITHOUT ESOPHAGITIS: ICD-10-CM

## 2024-09-30 DIAGNOSIS — Z12.2 SCREENING FOR LUNG CANCER: ICD-10-CM

## 2024-09-30 LAB
ALBUMIN SERPL BCP-MCNC: 3.9 G/DL (ref 3.5–5.2)
ALP SERPL-CCNC: 81 U/L (ref 55–135)
ALT SERPL W/O P-5'-P-CCNC: 14 U/L (ref 10–44)
ANION GAP SERPL CALC-SCNC: 7 MMOL/L (ref 8–16)
AST SERPL-CCNC: 15 U/L (ref 10–40)
BILIRUB SERPL-MCNC: 0.4 MG/DL (ref 0.1–1)
BUN SERPL-MCNC: 11 MG/DL (ref 8–23)
CALCIUM SERPL-MCNC: 9.9 MG/DL (ref 8.7–10.5)
CHLORIDE SERPL-SCNC: 101 MMOL/L (ref 95–110)
CHOLEST SERPL-MCNC: 145 MG/DL (ref 120–199)
CHOLEST/HDLC SERPL: 5 {RATIO} (ref 2–5)
CO2 SERPL-SCNC: 29 MMOL/L (ref 23–29)
CREAT SERPL-MCNC: 1.2 MG/DL (ref 0.5–1.4)
ERYTHROCYTE [DISTWIDTH] IN BLOOD BY AUTOMATED COUNT: 14 % (ref 11.5–14.5)
EST. GFR  (NO RACE VARIABLE): >60 ML/MIN/1.73 M^2
ESTIMATED AVG GLUCOSE: 131 MG/DL (ref 68–131)
GLUCOSE SERPL-MCNC: 92 MG/DL (ref 70–110)
HBA1C MFR BLD: 6.2 % (ref 4–5.6)
HCT VFR BLD AUTO: 47.6 % (ref 40–54)
HDLC SERPL-MCNC: 29 MG/DL (ref 40–75)
HDLC SERPL: 20 % (ref 20–50)
HGB BLD-MCNC: 15.2 G/DL (ref 14–18)
LDLC SERPL CALC-MCNC: 94.6 MG/DL (ref 63–159)
MCH RBC QN AUTO: 26.3 PG (ref 27–31)
MCHC RBC AUTO-ENTMCNC: 31.9 G/DL (ref 32–36)
MCV RBC AUTO: 82 FL (ref 82–98)
NONHDLC SERPL-MCNC: 116 MG/DL
PLATELET # BLD AUTO: 219 K/UL (ref 150–450)
PMV BLD AUTO: 9.7 FL (ref 9.2–12.9)
POTASSIUM SERPL-SCNC: 4.6 MMOL/L (ref 3.5–5.1)
PROT SERPL-MCNC: 7.4 G/DL (ref 6–8.4)
RBC # BLD AUTO: 5.78 M/UL (ref 4.6–6.2)
SODIUM SERPL-SCNC: 137 MMOL/L (ref 136–145)
TRIGL SERPL-MCNC: 107 MG/DL (ref 30–150)
TSH SERPL DL<=0.005 MIU/L-ACNC: 1.9 UIU/ML (ref 0.4–4)
WBC # BLD AUTO: 8.12 K/UL (ref 3.9–12.7)

## 2024-09-30 PROCEDURE — 83036 HEMOGLOBIN GLYCOSYLATED A1C: CPT | Performed by: FAMILY MEDICINE

## 2024-09-30 PROCEDURE — 90653 IIV ADJUVANT VACCINE IM: CPT | Mod: PBBFAC

## 2024-09-30 PROCEDURE — G0009 ADMIN PNEUMOCOCCAL VACCINE: HCPCS | Mod: PBBFAC

## 2024-09-30 PROCEDURE — 99999 PR PBB SHADOW E&M-EST. PATIENT-LVL V: CPT | Mod: PBBFAC,,, | Performed by: FAMILY MEDICINE

## 2024-09-30 PROCEDURE — 90677 PCV20 VACCINE IM: CPT | Mod: PBBFAC

## 2024-09-30 PROCEDURE — 80053 COMPREHEN METABOLIC PANEL: CPT | Performed by: FAMILY MEDICINE

## 2024-09-30 PROCEDURE — G0008 ADMIN INFLUENZA VIRUS VAC: HCPCS | Mod: PBBFAC

## 2024-09-30 PROCEDURE — 80061 LIPID PANEL: CPT | Performed by: FAMILY MEDICINE

## 2024-09-30 PROCEDURE — 99215 OFFICE O/P EST HI 40 MIN: CPT | Mod: S$PBB,,, | Performed by: FAMILY MEDICINE

## 2024-09-30 PROCEDURE — 99999PBSHW PR PBB SHADOW TECHNICAL ONLY FILED TO HB: Mod: PBBFAC,,,

## 2024-09-30 PROCEDURE — 85027 COMPLETE CBC AUTOMATED: CPT | Performed by: FAMILY MEDICINE

## 2024-09-30 PROCEDURE — 36415 COLL VENOUS BLD VENIPUNCTURE: CPT | Performed by: FAMILY MEDICINE

## 2024-09-30 PROCEDURE — G2211 COMPLEX E/M VISIT ADD ON: HCPCS | Mod: S$PBB,,, | Performed by: FAMILY MEDICINE

## 2024-09-30 PROCEDURE — 99215 OFFICE O/P EST HI 40 MIN: CPT | Mod: PBBFAC,25 | Performed by: FAMILY MEDICINE

## 2024-09-30 PROCEDURE — 84443 ASSAY THYROID STIM HORMONE: CPT | Performed by: FAMILY MEDICINE

## 2024-09-30 RX ORDER — GLIMEPIRIDE 4 MG/1
4 TABLET ORAL
Qty: 180 TABLET | Refills: 3 | Status: SHIPPED | OUTPATIENT
Start: 2024-09-30 | End: 2025-09-30

## 2024-09-30 RX ORDER — CHOLESTYRAMINE 4 G/9G
4 POWDER, FOR SUSPENSION ORAL DAILY
Qty: 90 PACKET | Refills: 0 | Status: SHIPPED | OUTPATIENT
Start: 2024-09-30 | End: 2024-12-29

## 2024-09-30 RX ORDER — METFORMIN HYDROCHLORIDE 1000 MG/1
1000 TABLET ORAL 2 TIMES DAILY WITH MEALS
Qty: 180 TABLET | Refills: 3 | Status: SHIPPED | OUTPATIENT
Start: 2024-09-30 | End: 2025-09-30

## 2024-09-30 RX ADMIN — PNEUMOCOCCAL 20-VALENT CONJUGATE VACCINE 0.5 ML
2.2; 2.2; 2.2; 2.2; 2.2; 2.2; 2.2; 2.2; 2.2; 2.2; 2.2; 2.2; 2.2; 2.2; 2.2; 2.2; 4.4; 2.2; 2.2; 2.2 INJECTION, SUSPENSION INTRAMUSCULAR at 09:09

## 2024-09-30 RX ADMIN — INFLUENZA A VIRUS A/VICTORIA/4897/2022 IVR-238 (H1N1) ANTIGEN (FORMALDEHYDE INACTIVATED), INFLUENZA A VIRUS A/THAILAND/8/2022 IVR-237 (H3N2) ANTIGEN (FORMALDEHYDE INACTIVATED), INFLUENZA B VIRUS B/AUSTRIA/1359417/2021 BVR-26 ANTIGEN (FORMALDEHYDE INACTIVATED) 0.5 ML: 15; 15; 15 INJECTION, SUSPENSION INTRAMUSCULAR at 09:09

## 2024-09-30 NOTE — PROGRESS NOTES
Subjective:   Patient ID: Lamine Fong is a 69 y.o. male.  Chief Complaint:  Annual Exam and Diarrhea    Presents for annual exam and follow-up chronic medical conditions    Lab work done May 2024:  CBC with normal white blood cell count, red blood cell count, and platelet levels.  Sugar, Kidney, Liver, and Electrolyte tests are all normal.  B12 level normal  A1c is in a normal range.  Diabetes well controlled on current medication    Lab work done March 2024:  Lipid panel improved but LDL greater than 70  Continue aspirin 81 mg daily  Continue Pletal 100 mg twice a day  Continue simvastatin 20 mg daily  If his cardiologist agrees, restart Repatha 140 mg injections since LDL greater than 70    Medical history:  - Diabetes mellitus.  Also followed by diabetes management.  A1c May 2024 normal.  September 2023 microalbumin negative.  Prescribed Amaryl 4 mg twice a day, metformin 1000 mg twice a day and Mounjaro 2.5 mg weekly injections.  Unfortunately due to increased cost from [employer], off Mounjaro injections for 6-8 weeks.  Complains of increased blood glucose readings at home.  Continues to complain of chronic chronic diarrhea likely related to metformin. Denies any symptoms hypoglycemia.  - Hypertension with stable chronic diastolic Heart failure an Abdominal aortic aneurysm.  Controlled.  Followed by Cardiology.  On amlodipine 10 mg daily, Toprol-XL 25 mg daily, and losartan 50 mg daily.  Reports compliance.  Denies side effects.  No orthostasis.  No shortness of breath.    - Hyperlipidemia with Peripheral vascular disease, Coronary artery disease, and bilateral Carotid artery stenosis. Last CMP with normal kidney, liver, glucose,and electrolytes.  March 2024 LDL now less than 100 but greater than 70.  Discussed with Cardiology and prescribed Repatha 140 mg injections every 2 weeks.  Reports compliance till approximately 6-8 weeks ago and had to discontinue due to cost.  Continues on aspirin 81 mg daily and  "simvastatin 20 mg daily with Pletal 100 mg twice a day. Currently denies any chest pain or claudication.  - Neuropathy.  Multifactorial.  On gabapentin 300 mg 3 tablets nightly and multivitamin with B12 and B1 supplementation.  Pain pattern stable.  Last vitamin levels normal.  - History of alcohol abuse.  Continues to remain alcohol free.  - Depression. On citalopram 20 mg daily.  Higher dose not recommended due to underlying cardiac disease.  Reports compliance.  Denies side effects.  States mood and symptoms currently stable and well controlled.  Happy with medication.  Wants to continue.  - Chronic sun damaged skin.  Up-to-date on dermatology follow-up.  - GERD.  Stable on Protonix 40 mg daily.  - Cholelithiasis.  No cholecystitis.  No increased abdominal pain with GLP1 use.  Chronic diarrhea likely metformin and/or/gallbladder related.  Interested in starting medication other than over-the-counter Imodium to help with symptoms.  - Emphysema.  Stable.  No frequent rescue inhaler use.  No controller medication needed.    - Former Smoker.  Quit 2 years ago.  Overdue for low-dose CT lung cancer screening.      Health maintenance needs include shingles vaccine series, RSV vaccine, COVID booster, Prevnar 20 vaccine, and flu vaccine in addition to low-dose CT lung cancer screening.    Other than chronic loose stools, no additional complaints today      Review of Systems   Gastrointestinal:  Positive for diarrhea.   Neurological:  Positive for numbness.     Objective:   /70 (BP Location: Right arm, Patient Position: Sitting)   Pulse (!) 51   Temp 97.6 °F (36.4 °C) (Tympanic)   Ht 6' 4" (1.93 m)   Wt 118.8 kg (261 lb 14.5 oz)   SpO2 96%   BMI 31.88 kg/m²     Physical Exam  Vitals and nursing note reviewed.   Constitutional:       Appearance: Normal appearance. He is well-developed. He is obese.   Eyes:      General: No scleral icterus.     Conjunctiva/sclera: Conjunctivae normal.   Neck:      Vascular: " Normal carotid pulses. No carotid bruit or JVD.   Cardiovascular:      Rate and Rhythm: Regular rhythm. Bradycardia present.      Heart sounds: Normal heart sounds.   Pulmonary:      Effort: Pulmonary effort is normal.      Breath sounds: Normal breath sounds.   Abdominal:      General: There is no distension.      Palpations: Abdomen is soft.      Tenderness: There is no abdominal tenderness.   Musculoskeletal:      Right lower leg: No edema.      Left lower leg: No edema.   Skin:     Findings: No rash.       Assessment:       ICD-10-CM ICD-9-CM   1. Annual physical exam  Z00.00 V70.0   2. Need for pneumococcal vaccination  Z23 V03.82   3. Need for influenza vaccination  Z23 V04.81   4. Type 2 diabetes mellitus with other circulatory complication, without long-term current use of insulin  E11.59 250.70   5. Obesity (BMI 30.0-34.9)  E66.9 278.00   6. Hypertension associated with diabetes  E11.59 250.80    I15.2 401.9   7. Chronic diastolic (congestive) heart failure  I50.32 428.32     428.0   8. Hyperlipidemia associated with type 2 diabetes mellitus  E11.69 250.80    E78.5 272.4   9. Coronary artery disease of native artery of native heart with stable angina pectoris  I25.118 414.01     413.9   10. Bilateral carotid artery stenosis  I65.23 433.10     433.30   11. H/O carotid endarterectomy  Z98.890 V45.89   12. PAD (peripheral artery disease)  I73.9 443.9   13. Claudication of both upper extremities due to atherosclerosis  I70.218 440.21   14. Other depression  F32.89 311   15. Other long term (current) drug therapy  Z79.899 V58.69   16. Neuropathy  G62.9 355.9   17. Panlobular emphysema  J43.1 492.8   18. Gastroesophageal reflux disease without esophagitis  K21.9 530.81   19. Diarrhea, unspecified type  R19.7 787.91   20. Former smoker  Z87.891 V15.82   21. Screening for lung cancer  Z12.2 V76.0     Plan:   Annual physical exam  Need for pneumococcal vaccination  Need for influenza vaccination       -     CBC  Without Differential; Future; Expected date: 09/30/2024  -     Comprehensive Metabolic Panel; Future; Expected date: 09/30/2024  -     Lipid Panel; Future; Expected date: 09/30/2024  -     TSH; Future; Expected date: 09/30/2024  -     Hemoglobin A1C; Future; Expected date: 09/30/2024  -     Microalbumin/Creatinine Ratio, Urine; Future; Expected date: 09/30/2024  -     CT Chest Lung Screening Low Dose; Future; Expected date: 09/30/2024  -     pneumoc 20-chinmay conj-dip cr(PF) (PREVNAR-20 (PF)) injection Syrg 0.5 mL  -     influenza (adjuvanted) (Fluad) 45 mcg/0.5 mL IM vaccine (> or = 66 yo) 0.5 mL  Blood pressure normal.  BMI 32.  Check labs.  Treat as indicated.    Prevnar 20 vaccine and flu vaccine in clinic   Recommend COVID vaccine, shingles booster, and RSV vaccine through pharmacy    Type 2 diabetes mellitus with other circulatory complication, without long-term current use of insulin  Obesity (BMI 30.0-34.9)  -     SITagliptin phosphate (JANUVIA) 100 MG Tab; Take 1 tablet (100 mg total) by mouth once daily.  Dispense: 90 tablet; Refill: 3  -     glimepiride (AMARYL) 4 MG tablet; Take 1 tablet (4 mg total) by mouth 2 (two) times daily before meals.  Dispense: 180 tablet; Refill: 3  -     metFORMIN (GLUCOPHAGE) 1000 MG tablet; Take 1 tablet (1,000 mg total) by mouth 2 (two) times daily with meals.  Dispense: 180 tablet; Refill: 3  -     Ambulatory referral/consult to Ophthalmology; Future; Expected date: 10/07/2024  Asymptomatic   Medical noncompliance due to cost   Continue metformin 1000 mg twice a day  Continue Amaryl 4 mg twice a day   Start Januvia 100 mg daily in interim until able to afford/restart Mounjaro 2.5 mg weekly injections   Eye exam ordered   Microalbumin today.  If positive on Arb.    Foot exam up-to-date    Hypertension associated with diabetes  Chronic diastolic (congestive) heart failure  Controlled rate stable.  Asymptomatic.  BP at goal.    Continue amlodipine 10 mg daily   Continue losartan  50 mg daily   Continue Toprol-XL 25 mg daily     Hyperlipidemia associated with type 2 diabetes mellitus  Coronary artery disease of native artery of native heart with stable angina pectoris  Bilateral carotid artery stenosis  H/O carotid endarterectomy  PAD (peripheral artery disease)  Claudication of both upper extremities due to atherosclerosis  -     Lipid Panel; Future; Expected date: 09/30/2024  Asymptomatic   Noncompliance due to cost   Continue aspirin 81 mg daily, Pletal 100 mg daily, and simvastatin 20 mg daily   Restart Repatha 140 mg injections when affordable   Follow-up cardiology as scheduled     Other depression  Stable, no side effects, mood and symptoms well controlled on present medication .  Continue citalopram 20 mg daily    Neuropathy  Symptoms stable and well controlled   Continue gabapentin 30 mg 3 times a day   Continue B12 and B1 supplement     Panlobular emphysema  Symptoms stable   Continues without any frequent inhaler use  Controller medication needed     Gastroesophageal reflux disease without esophagitis    Symptoms stable and well controlled   Continue Protonix    Diarrhea, unspecified type  -     cholestyramine (QUESTRAN) 4 gram packet; Take 1 packet (4 g total) by mouth once daily.  Dispense: 90 packet; Refill: 0  Trial of Questran packet daily     Former smoker  Screening for lung cancer  -     CT Chest Lung Screening Low Dose; Future; Expected date: 09/30/2024  Meets criteria for agrees to overdue low-dose CT lung cancer screening    Follow up with any/all specialists scheduled     Return to clinic 6 months or sooner as needed    40 minutes of total time spent on the encounter, which includes face to face time and non-face to face time preparing to see the patient (eg, review of tests), Obtaining and/or reviewing separately obtained history, documenting clinical information in the electronic or other health record, independently interpreting results (not separately reported) and  communicating results to the patient/family/caregiver, or Care coordination (not separately reported)    Visit today included increased complexity associated with managing the longitudinal care of the patient due to the serious and/or complex managed problem(s) listed above.

## 2024-10-23 RX ORDER — SIMVASTATIN 20 MG/1
20 TABLET, FILM COATED ORAL NIGHTLY
Qty: 90 TABLET | Refills: 3 | Status: SHIPPED | OUTPATIENT
Start: 2024-10-23

## 2024-10-23 NOTE — TELEPHONE ENCOUNTER
----- Message from Maira sent at 10/23/2024 12:43 PM CDT -----  Contact: [facility]  Type:  RX Refill Request    Who Called:  Jenn dickinson  Refill or New Rx: refill  RX Name and Strength: simvastatin (ZOCOR) 20 MG tablet  How is the patient currently taking it? (ex. 1XDay):  Is this a 30 day or 90 day RX:  Preferred Pharmacy with phone number:  Local or Mail Order: local  Ordering Provider: mills  Would the patient rather a call back or a response via [facility]?   Best Call Back Number:  Additional Information:       [place], [facility] [address] [place] [address]  [address] [address] [address]  Phone: [phone] Fax: [phone]

## 2024-10-29 DIAGNOSIS — I15.2 HYPERTENSION ASSOCIATED WITH DIABETES: Primary | Chronic | ICD-10-CM

## 2024-10-29 DIAGNOSIS — E11.59 HYPERTENSION ASSOCIATED WITH DIABETES: Primary | Chronic | ICD-10-CM

## 2024-10-29 DIAGNOSIS — I50.32 CHRONIC DIASTOLIC (CONGESTIVE) HEART FAILURE: ICD-10-CM

## 2024-11-08 ENCOUNTER — OFFICE VISIT (OUTPATIENT)
Dept: CARDIOLOGY | Facility: CLINIC | Age: 69
End: 2024-11-08
Payer: MEDICARE

## 2024-11-08 ENCOUNTER — HOSPITAL ENCOUNTER (OUTPATIENT)
Dept: CARDIOLOGY | Facility: HOSPITAL | Age: 69
Discharge: HOME | End: 2024-11-08
Attending: INTERNAL MEDICINE
Payer: MEDICARE

## 2024-11-08 VITALS
HEART RATE: 61 BPM | HEIGHT: 76 IN | WEIGHT: 261 LBS | SYSTOLIC BLOOD PRESSURE: 132 MMHG | DIASTOLIC BLOOD PRESSURE: 70 MMHG | BODY MASS INDEX: 31.78 KG/M2 | RESPIRATION RATE: 16 BRPM | OXYGEN SATURATION: 95 %

## 2024-11-08 DIAGNOSIS — I71.43 INFRARENAL ABDOMINAL AORTIC ANEURYSM (AAA) WITHOUT RUPTURE: ICD-10-CM

## 2024-11-08 DIAGNOSIS — E78.5 HYPERLIPIDEMIA ASSOCIATED WITH TYPE 2 DIABETES MELLITUS: Chronic | ICD-10-CM

## 2024-11-08 DIAGNOSIS — E11.69 HYPERLIPIDEMIA ASSOCIATED WITH TYPE 2 DIABETES MELLITUS: Chronic | ICD-10-CM

## 2024-11-08 DIAGNOSIS — I65.23 BILATERAL CAROTID ARTERY STENOSIS: Chronic | ICD-10-CM

## 2024-11-08 DIAGNOSIS — I73.9 PAD (PERIPHERAL ARTERY DISEASE): Chronic | ICD-10-CM

## 2024-11-08 DIAGNOSIS — I15.2 HYPERTENSION ASSOCIATED WITH DIABETES: Chronic | ICD-10-CM

## 2024-11-08 DIAGNOSIS — Z98.890 H/O CAROTID ENDARTERECTOMY: ICD-10-CM

## 2024-11-08 DIAGNOSIS — E11.59 HYPERTENSION ASSOCIATED WITH DIABETES: Chronic | ICD-10-CM

## 2024-11-08 DIAGNOSIS — I25.118 CORONARY ARTERY DISEASE OF NATIVE ARTERY OF NATIVE HEART WITH STABLE ANGINA PECTORIS: Primary | Chronic | ICD-10-CM

## 2024-11-08 DIAGNOSIS — I72.3 ANEURYSM OF LEFT ILIAC ARTERY: ICD-10-CM

## 2024-11-08 DIAGNOSIS — I50.32 CHRONIC DIASTOLIC (CONGESTIVE) HEART FAILURE: ICD-10-CM

## 2024-11-08 DIAGNOSIS — E11.59 TYPE 2 DIABETES MELLITUS WITH OTHER CIRCULATORY COMPLICATION, WITHOUT LONG-TERM CURRENT USE OF INSULIN: Chronic | ICD-10-CM

## 2024-11-08 DIAGNOSIS — I70.218 CLAUDICATION OF BOTH UPPER EXTREMITIES DUE TO ATHEROSCLEROSIS: ICD-10-CM

## 2024-11-08 LAB
OHS QRS DURATION: 94 MS
OHS QTC CALCULATION: 419 MS

## 2024-11-08 PROCEDURE — G2211 COMPLEX E/M VISIT ADD ON: HCPCS | Mod: S$PBB,,, | Performed by: INTERNAL MEDICINE

## 2024-11-08 PROCEDURE — 99215 OFFICE O/P EST HI 40 MIN: CPT | Mod: PBBFAC,25 | Performed by: INTERNAL MEDICINE

## 2024-11-08 PROCEDURE — 93005 ELECTROCARDIOGRAM TRACING: CPT

## 2024-11-08 PROCEDURE — 99999 PR PBB SHADOW E&M-EST. PATIENT-LVL V: CPT | Mod: PBBFAC,,, | Performed by: INTERNAL MEDICINE

## 2024-11-08 PROCEDURE — 93010 ELECTROCARDIOGRAM REPORT: CPT | Mod: ,,, | Performed by: INTERNAL MEDICINE

## 2024-11-08 PROCEDURE — 99214 OFFICE O/P EST MOD 30 MIN: CPT | Mod: S$PBB,,, | Performed by: INTERNAL MEDICINE

## 2024-11-08 NOTE — PROGRESS NOTES
Subjective:   Patient ID:  Lamine Fong is a 69 y.o. male who presents for follow up of No chief complaint on file.      68 yo male came in for 6 months f/u.  PMH CAD h/o LHC showed distal LCX , orthostatic hypotension. DM > 5 yrs former heavy drinker and smoker, coronary atherosclerosis. PAD, AAA 5 cm, left iliac aneurysm, and carotid artery Dz s/p left CEA by Dr. Fletcher  In , c/o+ postaural dizziness when taking amlodipine, metorpolol and clonidine.   Now on amlodipine only for HTN and dizziness resolved.   Drinking issue on and off for 30 years. Pint vodka and beers daily, Off alcohol for 30 days,   Smoker 40 yrs 1ppd and quit 2 months ago, now vaping and patch   echo biv function;  Carotid US There is less than 50% diameter reduction on the right. There is 50-69% stenosis within the left proximal and mid ICA  Brain mild microvascular Dz   abd CT Coronary and aortic calcific atherosclerosis are noted. AAA 4.6 cm infra renal   Today EKG NSR    Good appetite, weight gain   No SOB chest pain dizziness faint   Claudication if walking a lot     LE arterial US showed bilaetral SAF pcclued and ABIs 0.54  MPI showed possible apical ischemia and normal EF  Leg calf muscle pain after walking for 10 min. No rest pain. The feet normal color and warm. PT DP pulse weak  Off Metoprolol deu to bradycardia,.   Added lipitor     visit  H/o LHC showed distal LCx 100% lesion and collateral circulation to LPDA. Continue medical Rx.  PAD with exertional claudication.  Right groin stable  No chest pain, leg swelling      visit  Leg pain dtarted from the knee and calf muscle, worse after walking. Has the pain at rest. No change after added Pletal. No leg and feet ulcer. F/u at podiatry  No chest pain. Chronic SOB. No palpitation dizziness faint     03/23 visit  Facial numbness resolved after left carotid CEA.   Plan to have left leg fem bypass and then right leg bypass. AAA procedure in  the future  No chest pain dizziness palpitation and leg swelling   SOB mild, quit smoking     10/23 visit  S/p bilateral fem bypass by Dr. Fletcher at [facility] in 05/23.   Now improved walking. Cramp resolved. Remains peripheral neuropathy.   No chest pain, dizziness palpitation orthopnea and PND. No active bleeding   Some feet swelling  Ekg NSR PAcs  AAA stenting pending    01/19/24 failed to deliver repatha per [facility]    05/24 visit   No chest pain dyspnea faint  LDL 89. No smoking no falling    Interval history  Off repatha for 2 m due to donut hole and resume in 01/25. ( Per [facility], pt was unable to reach. )  LDL was 98 when took Repatha in 09/24  Denied chest pain, dyspnea on exertion, palpitation, fainting, PND, orthopnea, syncope and claudication.   BP A1c C  EKG reviewed by myself today reveals NSR         Past Medical History:   Diagnosis Date    Alcohol abuse     Quit 1/2022    Anticoagulant long-term use     Aortic aneurysm     Artery occlusion     Carotid artery occlusion     COPD (chronic obstructive pulmonary disease)     no home O2    Coronary artery disease     Depression     Diabetes mellitus     Hypertension     Hypokalemia     Hypomagnesemia     SHANELLE (obstructive sleep apnea)     without cpap       Past Surgical History:   Procedure Laterality Date    ANGIOGRAPHY OF LOWER EXTREMITY Right 1/23/2023    Procedure: Angiogram Extremity Unilateral/ right leg;  Surgeon: Bharat Fletcher IV, MD;  Location: [facility] CATH LAB;  Service: Cardiovascular;  Laterality: Right;    CAROTID ENDARTERECTOMY Left 9/27/2022    Procedure: ENDARTERECTOMY-CAROTID;  Surgeon: Bharat Fletcher IV, MD;  Location: [facility] OR;  Service: Cardiovascular;  Laterality: Left;    HERNIA REPAIR      umbilical    LEFT HEART CATHETERIZATION Left 2/28/2022    Procedure: CATHETERIZATION, HEART, LEFT;  Surgeon: Jadiel Simons MD;  Location: [facility] CATH LAB;  Service: Cardiology;  Laterality: Left;       Social History     Tobacco Use     Smoking status: Every Day     Current packs/day: 0.00     Average packs/day: 1 pack/day for 53.9 years (53.9 ttl pk-yrs)     Types: Cigarettes, Vaping with nicotine     Start date: 1968     Last attempt to quit: 2021     Years since quittin.9    Smokeless tobacco: Current   Substance Use Topics    Alcohol use: Not Currently     Comment: History of alcoholism    Drug use: No       Family History   Problem Relation Name Age of Onset    Diabetes Mellitus Father Matt Fong     Diabetes Father Matt Fong     Heart disease Father Matt Fong     Diabetes Mother Richelle Fong     Heart disease Brother Edin HIGHTOWER    Objective:   Physical Exam  HENT:      Head: Normocephalic.   Eyes:      Pupils: Pupils are equal, round, and reactive to light.   Neck:      Thyroid: No thyromegaly.      Vascular: Normal carotid pulses. No carotid bruit or JVD.   Cardiovascular:      Rate and Rhythm: Normal rate and regular rhythm. No extrasystoles are present.     Chest Wall: PMI is not displaced.      Pulses: Normal pulses.      Heart sounds: Normal heart sounds. No murmur heard.     No gallop. No S3 sounds.   Pulmonary:      Effort: No respiratory distress.      Breath sounds: Normal breath sounds. No stridor.   Abdominal:      General: Bowel sounds are normal.      Palpations: Abdomen is soft.      Tenderness: There is no abdominal tenderness. There is no rebound.   Skin:     Findings: No rash.   Neurological:      Mental Status: He is alert and oriented to person, place, and time.   Psychiatric:         Behavior: Behavior normal.     Lab Results   Component Value Date    CHOL 145 2024    CHOL 137 2024    CHOL 181 2023     Lab Results   Component Value Date    HDL 29 (L) 2024    HDL 27 (L) 2024    HDL 28 (L) 2023     Lab Results   Component Value Date    LDLCALC 94.6 2024    LDLCALC 88.8 2024    LDLCALC 135.6 2023     Lab Results   Component  Value Date    TRIG 107 09/30/2024    TRIG 106 03/25/2024    TRIG 87 09/12/2023     Lab Results   Component Value Date    CHOLHDL 20.0 09/30/2024    CHOLHDL 19.7 (L) 03/25/2024    CHOLHDL 15.5 (L) 09/12/2023       Chemistry        Component Value Date/Time     09/30/2024 0937    K 4.6 09/30/2024 0937     09/30/2024 0937    CO2 29 09/30/2024 0937    BUN 11 09/30/2024 0937    CREATININE 1.2 09/30/2024 0937    GLU 92 09/30/2024 0937        Component Value Date/Time    CALCIUM 9.9 09/30/2024 0937    ALKPHOS 81 09/30/2024 0937    AST 15 09/30/2024 0937    ALT 14 09/30/2024 0937    BILITOT 0.4 09/30/2024 0937    ESTGFRAFRICA >60 07/30/2022 0840    ESTGFRAFRICA >60 07/30/2022 0840    EGFRNONAA >60 07/30/2022 0840    EGFRNONAA >60 07/30/2022 0840          Lab Results   Component Value Date    HGBA1C 6.2 (H) 09/30/2024     Lab Results   Component Value Date    TSH 1.901 09/30/2024     Lab Results   Component Value Date    INR 1.0 01/23/2023    INR 0.9 02/28/2022     Lab Results   Component Value Date    WBC 8.12 09/30/2024    HGB 15.2 09/30/2024    HCT 47.6 09/30/2024    MCV 82 09/30/2024     09/30/2024     BMP  Sodium   Date Value Ref Range Status   09/30/2024 137 136 - 145 mmol/L Final     Potassium   Date Value Ref Range Status   09/30/2024 4.6 3.5 - 5.1 mmol/L Final     Chloride   Date Value Ref Range Status   09/30/2024 101 95 - 110 mmol/L Final     CO2   Date Value Ref Range Status   09/30/2024 29 23 - 29 mmol/L Final     BUN   Date Value Ref Range Status   09/30/2024 11 8 - 23 mg/dL Final     Creatinine   Date Value Ref Range Status   09/30/2024 1.2 0.5 - 1.4 mg/dL Final     Calcium   Date Value Ref Range Status   09/30/2024 9.9 8.7 - 10.5 mg/dL Final     Anion Gap   Date Value Ref Range Status   09/30/2024 7 (L) 8 - 16 mmol/L Final     eGFR if    Date Value Ref Range Status   07/30/2022 >60 >60 mL/min/1.73 m^2 Final   07/30/2022 >60 >60 mL/min/1.73 m^2 Final     eGFR if non African  American   Date Value Ref Range Status   07/30/2022 >60 >60 mL/min/1.73 m^2 Final     Comment:     Calculation used to obtain the estimated glomerular filtration  rate (eGFR) is the CKD-EPI equation.      07/30/2022 >60 >60 mL/min/1.73 m^2 Final     Comment:     Calculation used to obtain the estimated glomerular filtration  rate (eGFR) is the CKD-EPI equation.        BNP  @LABRCNTIP(BNP,BNPTRIAGEBLO)@  @LABRCNTIP(troponini)@  CrCl cannot be calculated (Patient's most recent lab result is older than the maximum 7 days allowed.).  No results found in the last 24 hours.  No results found in the last 24 hours.  No results found in the last 24 hours.    Assessment:      1. Coronary artery disease of native artery of native heart with stable angina pectoris    2. Infrarenal abdominal aortic aneurysm (AAA) without rupture    3. Bilateral carotid artery stenosis    4. Chronic diastolic (congestive) heart failure    5. Claudication of both upper extremities due to atherosclerosis    6. H/O carotid endarterectomy    7. Hyperlipidemia associated with type 2 diabetes mellitus    8. Hypertension associated with diabetes    9. PAD (peripheral artery disease)    10. Aneurysm of left iliac artery    11. Type 2 diabetes mellitus with other circulatory complication, without long-term current use of insulin        Plan:   Leqvio ordered for HLD CAD PAD  Resume repatha after new insurance  Continue asa amlodipine pletal losartan toprolXL statin  DM Rx per PCP  Counseled DASH  Check Lipid profile with PCP in 6 months  Recommend heart-healthy diet, weight control and regular exercise.  Melva. Risk modification.   I have reviewed all pertinent labs and cardiac studies independently. Plans and recommendations have been formulated under my direct supervision. All questions answered and patient voiced understanding.   If symptoms persist go to the ED  RTC in 6 months    11/13/24  Per [facility] recommendation, d/c Leqvio and retry repatha

## 2024-11-13 ENCOUNTER — TELEPHONE (OUTPATIENT)
Dept: CARDIOLOGY | Facility: CLINIC | Age: 69
End: 2024-11-13
Payer: MEDICARE

## 2024-11-13 NOTE — TELEPHONE ENCOUNTER
----- Message from Pharmacist Tommy sent at 11/13/2024 11:07 AM CST -----  Regarding: Leqvio  Good morning Dr. Valencia,    We received the Leqvio Rx for this patient.  I was reading your office visit note from 11/8 and noticed that the patient is stating he stopped his Repatha due to his insurance.  His Repatha is approved on his insurance plan until 12/31/24 and we are able to complete PA renewal for Repatha so that it will be approved for the 2025 calendar year as well.  We have tommy funding on file that reduces this patient's co-pay to $0.  I do not see any current insurance barriers to this patient taking Repatha.  The reason the patient stopped Repatha was due to [facility] being unable to contact him.  Is it possible to consider the patient re-starting Repatha and hold off on his starting Leqvio?      Thanks,   Tommy Beltran, PharmD  [facility]  [phone]

## 2024-11-14 RX ORDER — GABAPENTIN 300 MG/1
CAPSULE ORAL
Qty: 90 CAPSULE | Refills: 11 | Status: SHIPPED | OUTPATIENT
Start: 2024-11-14

## 2024-11-14 NOTE — TELEPHONE ENCOUNTER
No care due was identified.  [facility] Embedded Care Due Messages. Reference number: [id number].   11/14/2024 8:05:16 AM CST

## 2024-11-14 NOTE — TELEPHONE ENCOUNTER
Refill Routing Note   Medication(s) are not appropriate for processing by [facility] for the following reason(s):        Outside of protocol    [facility] action(s):  Route               Appointments  past 12m or future 3m with PCP    Date Provider   Last Visit   9/30/2024 Carlitos Michelle MD   Next Visit   3/31/2025 Carlitos Michelle MD   ED visits in past 90 days: 0        Note composed:9:11 AM 11/14/2024

## 2025-01-11 DIAGNOSIS — K21.9 GASTROESOPHAGEAL REFLUX DISEASE WITHOUT ESOPHAGITIS: ICD-10-CM

## 2025-01-11 RX ORDER — PANTOPRAZOLE SODIUM 40 MG/1
TABLET, DELAYED RELEASE ORAL
Qty: 90 TABLET | Refills: 2 | Status: SHIPPED | OUTPATIENT
Start: 2025-01-11

## 2025-01-11 NOTE — TELEPHONE ENCOUNTER
Provider Staff:  Action required for this patient    Requires labs      Please see care gap opportunities below in Care Due Message.    Thanks!  [facility]     Appointments      Date Provider   Last Visit   9/30/2024 Carlitos Michelle MD   Next Visit   3/31/2025 Carlitos Michelle MD     Refill Decision Note   Lamine Fong  is requesting a refill authorization.  Brief Assessment and Rationale for Refill:  Approve     Medication Therapy Plan:         Comments:     Note composed:2:52 PM 01/11/2025

## 2025-01-11 NOTE — TELEPHONE ENCOUNTER
Care Due:                  Date            Visit Type   Department     Provider  --------------------------------------------------------------------------------                                MYCHART                              ANNUAL                              CHECKUP/PHY  [facility] INTERNAL  Last Visit: 09-      S            POOJA Michelle                              EP -                              PRIMARY      [facility] INTERNAL  Next Visit: 03-      CARE ([facility])   MEDICINE       Carlitos Michelle                                                            Last  Test          Frequency    Reason                     Performed    Due Date  --------------------------------------------------------------------------------    HBA1C.......  6 months...  SITagliptin, glimepiride,   09- 03-                             metFORMIN, tirzepatide...    [employer] Embedded Care Due Messages. Reference number: [id number].   1/11/2025 10:36:45 AM CST

## 2025-01-13 DIAGNOSIS — R19.7 DIARRHEA, UNSPECIFIED TYPE: ICD-10-CM

## 2025-01-13 NOTE — TELEPHONE ENCOUNTER
No care due was identified.  [facility] Embedded Care Due Messages. Reference number: [id number].   1/13/2025 3:34:13 PM CST

## 2025-01-14 DIAGNOSIS — Z00.00 ENCOUNTER FOR MEDICARE ANNUAL WELLNESS EXAM: ICD-10-CM

## 2025-01-14 RX ORDER — CHOLESTYRAMINE 4 G/9G
4 POWDER, FOR SUSPENSION ORAL DAILY
Qty: 90 PACKET | Refills: 3 | Status: SHIPPED | OUTPATIENT
Start: 2025-01-14

## 2025-01-14 NOTE — TELEPHONE ENCOUNTER
Refill Routing Note   Medication(s) are not appropriate for processing by [facility] for the following reason(s):        No active prescription written by provider    [facility] action(s):  Defer        Medication Therapy Plan: Prescription ; DEFER TO PCP      Appointments  past 12m or future 3m with PCP    Date Provider   Last Visit   2024 Carlitos Michelle MD   Next Visit   3/31/2025 Carlitos Michelle MD   ED visits in past 90 days: 0        Note composed:8:40 AM 2025

## 2025-01-15 ENCOUNTER — OFFICE VISIT (OUTPATIENT)
Dept: OPHTHALMOLOGY | Facility: CLINIC | Age: 70
End: 2025-01-15
Payer: MEDICARE

## 2025-01-15 DIAGNOSIS — H25.13 NUCLEAR SCLEROSIS, BILATERAL: ICD-10-CM

## 2025-01-15 DIAGNOSIS — H25.013 CORTICAL AGE-RELATED CATARACT OF BOTH EYES: ICD-10-CM

## 2025-01-15 DIAGNOSIS — E11.59 TYPE 2 DIABETES MELLITUS WITH OTHER CIRCULATORY COMPLICATION, WITHOUT LONG-TERM CURRENT USE OF INSULIN: ICD-10-CM

## 2025-01-15 DIAGNOSIS — H52.203 ASTIGMATISM WITH PRESBYOPIA, BILATERAL: ICD-10-CM

## 2025-01-15 DIAGNOSIS — H52.4 ASTIGMATISM WITH PRESBYOPIA, BILATERAL: ICD-10-CM

## 2025-01-15 DIAGNOSIS — E11.9 TYPE 2 DIABETES MELLITUS WITHOUT RETINOPATHY: Primary | ICD-10-CM

## 2025-01-15 DIAGNOSIS — E11.36 DIABETIC CATARACT OF BOTH EYES: ICD-10-CM

## 2025-01-15 DIAGNOSIS — H35.89 RPE MOTTLING OF MACULA: ICD-10-CM

## 2025-01-15 PROCEDURE — 92014 COMPRE OPH EXAM EST PT 1/>: CPT | Mod: S$PBB,,, | Performed by: OPTOMETRIST

## 2025-01-15 PROCEDURE — 99999 PR PBB SHADOW E&M-EST. PATIENT-LVL II: CPT | Mod: PBBFAC,,, | Performed by: OPTOMETRIST

## 2025-01-15 PROCEDURE — 92134 CPTRZ OPH DX IMG PST SGM RTA: CPT | Mod: PBBFAC | Performed by: OPTOMETRIST

## 2025-01-15 PROCEDURE — 99212 OFFICE O/P EST SF 10 MIN: CPT | Mod: PBBFAC | Performed by: OPTOMETRIST

## 2025-01-15 PROCEDURE — 92015 DETERMINE REFRACTIVE STATE: CPT | Mod: ,,, | Performed by: OPTOMETRIST

## 2025-01-15 RX ORDER — SIMVASTATIN 20 MG/1
20 TABLET, FILM COATED ORAL NIGHTLY
Qty: 90 TABLET | Refills: 3 | Status: SHIPPED | OUTPATIENT
Start: 2025-01-15

## 2025-01-15 NOTE — PROGRESS NOTES
HPI     Diabetic Eye Exam            Comments:   Lab Results       Component                Value               Date                       HGBA1C                   6.2 (H)             09/30/2024                      Comments    DM x 2019    RPE Mottling OD            Last edited by Rajendra Gold, OD on 1/15/2025  9:43 AM.            Assessment /Plan     For exam results, see Encounter Report.    Type 2 diabetes mellitus without retinopathy  There was no diabetic retinopathy present in either eye today.   Recommended that pt continue care with PCP and/or specialists regarding diabetes.  Follow-up dilated eye exam recommended in 12 months, sooner with any vision changes or new concerns.    Nuclear sclerosis, bilateral  Cortical age-related cataract of both eyes  Diabetic cataract of both eyes  Visually significant cataract.  Patient is at the option stage.   Cataract surgery will improve vision, but necessity is dependent on patient's symptoms.   Pt declines surgical consult at this time.  Will continue to monitor over the next 12 months. Pt to call or RTC with any significant change in vision prior to next visit.    RPE mottling of macula        -     Posterior Segment OCT Retina-Both eyes  Improved since last visit with no srf, no rped  Recommended AREDS2 vitamins  Monitor 12 months    Astigmatism with presbyopia, bilateral  Eyeglass Final Rx       Eyeglass Final Rx         Sphere Cylinder Axis Add    Right +0.50 +0.25 005 +2.50    Left -0.25 +1.00 160 +2.50      Type: PAL    Expiration Date: 1/15/2026              Eyeglass Final Rx #2         Sphere Cylinder Axis Add    Right +3.00 +0.25 005     Left +2.25 +1.00 160       Type: SVL- Reading    Expiration Date: 1/15/2026              Eyeglass Final Rx Comments    Unlike signs ok                   RTC 1 yr for dilated eye exam with mOCT or PRN if any problems.   Discussed above and answered questions.

## 2025-02-15 DIAGNOSIS — F32.89 OTHER DEPRESSION: ICD-10-CM

## 2025-02-15 NOTE — TELEPHONE ENCOUNTER
No care due was identified.  [facility] Embedded Care Due Messages. Reference number: [id number].   2/15/2025 2:17:05 PM CST

## 2025-02-16 NOTE — TELEPHONE ENCOUNTER
Refill Routing Note   Medication(s) are not appropriate for processing by [facility] for the following reason(s):        Drug-drug interaction: citalopram and cilostazoL  Drug-disease interaction: citalopram and Chronic diastolic (congestive) heart failure    [facility] action(s):  Defer               Appointments  past 12m or future 3m with PCP    Date Provider   Last Visit   9/30/2024 Carlitos Michelle MD   Next Visit   3/31/2025 Carlitos Michelle MD   ED visits in past 90 days: 0        Note composed:1:09 PM 02/16/2025

## 2025-02-17 RX ORDER — CITALOPRAM 20 MG/1
TABLET, FILM COATED ORAL
Qty: 90 TABLET | Refills: 3 | Status: SHIPPED | OUTPATIENT
Start: 2025-02-17

## 2025-03-31 ENCOUNTER — TELEPHONE (OUTPATIENT)
Dept: INTERNAL MEDICINE | Facility: CLINIC | Age: 70
End: 2025-03-31
Payer: MEDICARE

## 2025-03-31 NOTE — TELEPHONE ENCOUNTER
Attempt to contact pt regarding rescheduling appt due to PCP being out; SEVERINO lewism stating appt will be canceled & callback # to reschedule /LD

## 2025-04-15 DIAGNOSIS — M25.511 CHRONIC RIGHT SHOULDER PAIN: Primary | ICD-10-CM

## 2025-04-15 DIAGNOSIS — G89.29 CHRONIC RIGHT SHOULDER PAIN: Primary | ICD-10-CM

## 2025-04-17 ENCOUNTER — OFFICE VISIT (OUTPATIENT)
Dept: SPORTS MEDICINE | Facility: CLINIC | Age: 70
End: 2025-04-17
Payer: MEDICARE

## 2025-04-17 ENCOUNTER — HOSPITAL ENCOUNTER (OUTPATIENT)
Dept: RADIOLOGY | Facility: HOSPITAL | Age: 70
Discharge: HOME | End: 2025-04-17
Attending: NURSE PRACTITIONER
Payer: MEDICARE

## 2025-04-17 VITALS — BODY MASS INDEX: 31.71 KG/M2 | HEIGHT: 76 IN | WEIGHT: 260.38 LBS

## 2025-04-17 DIAGNOSIS — G89.29 CHRONIC RIGHT SHOULDER PAIN: ICD-10-CM

## 2025-04-17 DIAGNOSIS — M19.011 PRIMARY OSTEOARTHRITIS OF RIGHT SHOULDER: Primary | ICD-10-CM

## 2025-04-17 DIAGNOSIS — M25.511 CHRONIC RIGHT SHOULDER PAIN: ICD-10-CM

## 2025-04-17 PROCEDURE — 99213 OFFICE O/P EST LOW 20 MIN: CPT | Mod: PBBFAC,25,PN | Performed by: NURSE PRACTITIONER

## 2025-04-17 PROCEDURE — 99999 PR PBB SHADOW E&M-EST. PATIENT-LVL III: CPT | Mod: PBBFAC,,, | Performed by: NURSE PRACTITIONER

## 2025-04-17 PROCEDURE — 99999PBSHW PR PBB SHADOW TECHNICAL ONLY FILED TO HB: Mod: PBBFAC,,,

## 2025-04-17 PROCEDURE — 73030 X-RAY EXAM OF SHOULDER: CPT | Mod: 26,RT,, | Performed by: RADIOLOGY

## 2025-04-17 PROCEDURE — 20610 DRAIN/INJ JOINT/BURSA W/O US: CPT | Mod: PBBFAC,PN,RT | Performed by: NURSE PRACTITIONER

## 2025-04-17 PROCEDURE — 73030 X-RAY EXAM OF SHOULDER: CPT | Mod: TC,PN,RT

## 2025-04-17 RX ORDER — BUPIVACAINE HYDROCHLORIDE 2.5 MG/ML
3 INJECTION, SOLUTION INFILTRATION; PERINEURAL
Status: DISCONTINUED | OUTPATIENT
Start: 2025-04-17 | End: 2025-04-17 | Stop reason: HOSPADM

## 2025-04-17 RX ORDER — IBUPROFEN 600 MG/1
600 TABLET ORAL EVERY 8 HOURS PRN
Qty: 60 TABLET | Refills: 1 | Status: SHIPPED | OUTPATIENT
Start: 2025-04-17 | End: 2025-05-27

## 2025-04-17 RX ORDER — BETAMETHASONE SODIUM PHOSPHATE AND BETAMETHASONE ACETATE 3; 3 MG/ML; MG/ML
6 INJECTION, SUSPENSION INTRA-ARTICULAR; INTRALESIONAL; INTRAMUSCULAR; SOFT TISSUE
Status: DISCONTINUED | OUTPATIENT
Start: 2025-04-17 | End: 2025-04-17 | Stop reason: HOSPADM

## 2025-04-17 RX ADMIN — BUPIVACAINE HYDROCHLORIDE 3 ML: 2.5 INJECTION, SOLUTION INFILTRATION; PERINEURAL at 02:04

## 2025-04-17 RX ADMIN — BETAMETHASONE ACETATE AND BETAMETHASONE SODIUM PHOSPHATE 6 MG: 3; 3 INJECTION, SUSPENSION INTRA-ARTICULAR; INTRALESIONAL; INTRAMUSCULAR; SOFT TISSUE at 02:04

## 2025-04-17 NOTE — ASSESSMENT & PLAN NOTE
The patient will begin ibuprofen prescription 2-3 times daily  He is encouraged to perform stretching flexibility range of motion of the right shoulder  He is to return in 4-6 weeks if needed   --we would then provide him with a repeat injection and referral to Dr. Guido for further surgical options including a right shoulder arthroplasty    I briefly went over the perioperative intraoperative and postoperative protocols of that entailing a shoulder arthroplasty he verbalized understanding

## 2025-04-17 NOTE — PROGRESS NOTES
Chief Complaint: right shoulder pain  Chief Complaint   Patient presents with    Right Shoulder - Pain        History of Present Illness: Lamine Fong is a 70 y.o. male who has a history of exacerbation of right shoulder pain and restricted range of motion.  Ms. 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 The patient notes pain and moderate and 8 / 10     ROS:   Pulm: no resp distress  Muscloskeletal:  Right shoulder pain with range of motion crepitance grinding restricted range of motion    Past Medical History:   Past Medical History:   Diagnosis Date    Alcohol abuse     Quit 1/2022    Anticoagulant long-term use     Aortic aneurysm     Artery occlusion     Carotid artery occlusion     COPD (chronic obstructive pulmonary disease)     no home O2    Coronary artery disease     Depression     Diabetes mellitus     Hypertension     Hypokalemia     Hypomagnesemia     SHANELLE (obstructive sleep apnea)     without cpap      Past Surgical History:   Procedure Laterality Date    ANGIOGRAPHY OF LOWER EXTREMITY Right 1/23/2023    Procedure: Angiogram Extremity Unilateral/ right leg;  Surgeon: Bharat Fletcher IV, MD;  Location: [facility] CATH LAB;  Service: Cardiovascular;  Laterality: Right;    CAROTID ENDARTERECTOMY Left 9/27/2022    Procedure: ENDARTERECTOMY-CAROTID;  Surgeon: Bharat Fletcher IV, MD;  Location: [facility] OR;   Service: Cardiovascular;  Laterality: Left;    HERNIA REPAIR      umbilical    LEFT HEART CATHETERIZATION Left 2/28/2022    Procedure: CATHETERIZATION, HEART, LEFT;  Surgeon: Jadiel Simons MD;  Location: [facility] CATH LAB;  Service: Cardiology;  Laterality: Left;      Family History   Problem Relation Name Age of Onset    Diabetes Mellitus Father Matt Fong     Diabetes Father Matt Fong     Heart disease Father Matt Fong     Diabetes Mother Richelle Fong     Heart disease Brother Edin Fong       Social History[1]   Medication List with Changes/Refills   Current Medications    ALBUTEROL (PROVENTIL/VENTOLIN HFA) 90 MCG/ACTUATION INHALER    Inhale 2 puffs into the lungs every 4 (four) hours as needed for Wheezing or Shortness of Breath.    AMLODIPINE (NORVASC) 10 MG TABLET    TAKE 1 TABLET BY MOUTH DAILY    ASPIRIN (ECOTRIN) 81 MG EC TABLET    Take 1 tablet (81 mg total) by mouth once daily.    BLOOD PRESSURE CUFF MISC    1 each by Misc.(Non-Drug; Combo Route) route 2 (two) times a day.    BLOOD SUGAR DIAGNOSTIC STRP    To check BG 2 times daily, to use with insurance preferred meter    CHOLESTYRAMINE (QUESTRAN) 4 GRAM PACKET    Take 1 packet (4 g total) by mouth once daily.    CILOSTAZOL (PLETAL) 100 MG TAB    TAKE 1 TABLET BY MOUTH TWICE DAILY    CITALOPRAM (CELEXA) 20 MG TABLET    TAKE 1 TABLET BY MOUTH ONCE A DAY (FOR MOOD)    GABAPENTIN (NEURONTIN) 300 MG CAPSULE    TAKE 3 CAPSULES BY MOUTH EVERY EVENING    GLIMEPIRIDE (AMARYL) 4 MG TABLET    Take 1 tablet (4 mg total) by mouth 2 (two) times daily before meals.    KETOCONAZOLE (NIZORAL) 2 % CREAM    Apply topically 2 (two) times daily. To affected areas in between toes.    LANCETS MISC    To check BG 2 times daily, to use with insurance preferred meter    LOSARTAN (COZAAR) 50 MG TABLET    TAKE 1 TABLET BY MOUTH ONCE A DAY (FOR BLOOD PRESSURE)    METFORMIN (GLUCOPHAGE) 1000 MG TABLET    Take 1 tablet (1,000 mg total) by mouth 2 (two) times daily with  meals.    METOPROLOL SUCCINATE (TOPROL-XL) 25 MG 24 HR TABLET    TAKE 1 TABLET BY MOUTH ONCE A DAY (FOR BLOOD PRESSURE)    PANTOPRAZOLE (PROTONIX) 40 MG TABLET    (FOR ACID REFLUX) TAKE 1 TABLET BY MOUTH DAILY    SIMVASTATIN (ZOCOR) 20 MG TABLET    Take 1 tablet (20 mg total) by mouth every evening.    SITAGLIPTIN PHOSPHATE (JANUVIA) 100 MG TAB    Take 1 tablet (100 mg total) by mouth once daily.    TIRZEPATIDE (MOUNJARO) 2.5 MG/0.5 ML PNIJ    Inject 2.5 mg into the skin every 7 days.    TYLENOL 325 MG TABLET    Take 650 mg by mouth every 6 (six) hours as needed.    VITAMIN B-1, MONONITRATE, 100 MG TAB    Take 100 mg by mouth every morning.    VITAMIN B-12 1000 MCG TABLET    Take 1,000 mcg by mouth every 7 days.      Review of patient's allergies indicates:  No Known Allergies     Physical Exam:   BMI: Body mass index is 31.69 kg/m².    Imaging: Relevant imaging results reviewed and interpreted and discussed with the patient and/or family today.  X-rays right shoulder four views do demonstrate severe glenohumeral osteoarthritis with joint space narrowing large heterotopic calcification within the subacromial space large inferior humeral head osteophyte AC joint narrowing evidence of advanced glenohumeral osteoarthritis    Assessment:   1. Right shoulder glenohumeral advanced osteoarthritis    Treatment today:  Right SHOULDER:  Following verbal informed consent from the patient, an injection under sterile conditions 7.5 mg of 0.25% Marcaine and 6 mg of Celestone was injected into the subacromial space of the patient's  right shoulder using a posterior approach. The injection was instilled without difficulty. The patient tolerated the procedure well.         Plan:     The patient will begin ibuprofen prescription 2-3 times daily  He is encouraged to perform stretching flexibility range of motion of the right shoulder  He is to return in 4-6 weeks if needed   --we would then provide him with a repeat injection and  referral to Dr. Guido for further surgical options including a right shoulder arthroplasty    I briefly went over the perioperative intraoperative and postoperative protocols of that entailing a shoulder arthroplasty he verbalized understanding    I discussed worrisome and red flag signs and symptoms with the patient. The patient expressed understanding and agreed to alert me immediately or to go to the emergency room if they experience any of these.   Treatment plan was developed with input from the patient/family, and they expressed understanding and agreement with the plan. All questions were answered today.             Mariia Denise NP-C  Orthopedic Nurse Pracitioner  [facility]         [1]   Social History  Socioeconomic History    Marital status:    Tobacco Use    Smoking status: Every Day     Current packs/day: 0.00     Average packs/day: 1 pack/day for 53.9 years (53.9 ttl pk-yrs)     Types: Cigarettes, Vaping with nicotine     Start date: 1/1/1968     Last attempt to quit: 12/6/2021     Years since quitting: 3.3    Smokeless tobacco: Current   Substance and Sexual Activity    Alcohol use: Not Currently     Comment: History of alcoholism    Drug use: No    Sexual activity: Yes     Partners: Female     Social Drivers of Health     Financial Resource Strain: Low Risk  (2/5/2024)    Overall Financial Resource Strain (CARDIA)     Difficulty of Paying Living Expenses: Not hard at all   Food Insecurity: No Food Insecurity (2/5/2024)    Hunger Vital Sign     Worried About Running Out of Food in the Last Year: Never true     Ran Out of Food in the Last Year: Never true   Transportation Needs: No Transportation Needs (2/5/2024)    PRAPARE - Transportation     Lack of Transportation (Medical): No     Lack of Transportation (Non-Medical): No   Physical Activity: Inactive (2/5/2024)    Exercise Vital Sign     Days of Exercise per Week: 0 days     Minutes of Exercise per Session: 0 min   Stress: No Stress  Concern Present (2/5/2024)    [employer] [place] [employer] - Occupational Stress Questionnaire     Feeling of Stress : Not at all   Housing Stability: Low Risk  (2/5/2024)    Housing Stability Vital Sign     Unable to Pay for Housing in the Last Year: No     Number of Places Lived in the Last Year: 1     Unstable Housing in the Last Year: No

## 2025-04-17 NOTE — PROCEDURES
Large Joint Aspiration/Injection: R subacromial bursa    Date/Time: 4/17/2025 2:00 PM    Performed by: Mariia Denise NP  Authorized by: Mariia Denise NP    Consent Done?:  Yes (Verbal)  Indications:  Pain and arthritis  Site marked: the procedure site was marked    Timeout: prior to procedure the correct patient, procedure, and site was verified    Local anesthesia used?: No      Details:  Needle Size:  25 G  Ultrasonic Guidance for needle placement?: No    Approach:  Posterior  Location:  Shoulder  Site:  R subacromial bursa  Medications:  6 mg betamethasone acetate-betamethasone sodium phosphate 6 mg/mL; 3 mL BUPivacaine 0.25% (2.5 mg/ml) 0.25 % (2.5 mg/mL)  Patient tolerance:  Patient tolerated the procedure well with no immediate complications

## 2025-05-01 ENCOUNTER — OFFICE VISIT (OUTPATIENT)
Dept: SPORTS MEDICINE | Facility: CLINIC | Age: 70
End: 2025-05-01
Payer: MEDICARE

## 2025-05-01 VITALS — WEIGHT: 260.38 LBS | BODY MASS INDEX: 31.71 KG/M2 | HEIGHT: 76 IN

## 2025-05-01 DIAGNOSIS — M25.512 CHRONIC PAIN OF BOTH SHOULDERS: ICD-10-CM

## 2025-05-01 DIAGNOSIS — M25.511 CHRONIC PAIN OF BOTH SHOULDERS: ICD-10-CM

## 2025-05-01 DIAGNOSIS — G89.29 CHRONIC PAIN OF BOTH SHOULDERS: ICD-10-CM

## 2025-05-01 DIAGNOSIS — G89.29 CHRONIC RIGHT SHOULDER PAIN: Primary | ICD-10-CM

## 2025-05-01 DIAGNOSIS — M25.511 CHRONIC RIGHT SHOULDER PAIN: Primary | ICD-10-CM

## 2025-05-01 PROCEDURE — 99999PBSHW PR PBB SHADOW TECHNICAL ONLY FILED TO HB: Mod: JZ,PBBFAC,,

## 2025-05-01 PROCEDURE — 20610 DRAIN/INJ JOINT/BURSA W/O US: CPT | Mod: 50,PBBFAC,PN | Performed by: NURSE PRACTITIONER

## 2025-05-01 PROCEDURE — 99214 OFFICE O/P EST MOD 30 MIN: CPT | Mod: PBBFAC,PN | Performed by: NURSE PRACTITIONER

## 2025-05-01 PROCEDURE — 99999 PR PBB SHADOW E&M-EST. PATIENT-LVL IV: CPT | Mod: PBBFAC,,, | Performed by: NURSE PRACTITIONER

## 2025-05-01 RX ORDER — METHYLPREDNISOLONE ACETATE 40 MG/ML
40 INJECTION, SUSPENSION INTRA-ARTICULAR; INTRALESIONAL; INTRAMUSCULAR; SOFT TISSUE
Status: DISCONTINUED | OUTPATIENT
Start: 2025-05-01 | End: 2025-05-01 | Stop reason: HOSPADM

## 2025-05-01 RX ORDER — BUPIVACAINE HYDROCHLORIDE 2.5 MG/ML
3 INJECTION, SOLUTION INFILTRATION; PERINEURAL
Status: DISCONTINUED | OUTPATIENT
Start: 2025-05-01 | End: 2025-05-01 | Stop reason: HOSPADM

## 2025-05-01 RX ORDER — BUPIVACAINE HYDROCHLORIDE 2.5 MG/ML
2 INJECTION, SOLUTION INFILTRATION; PERINEURAL
Status: DISCONTINUED | OUTPATIENT
Start: 2025-05-01 | End: 2025-05-01 | Stop reason: HOSPADM

## 2025-05-01 RX ADMIN — BUPIVACAINE HYDROCHLORIDE 3 ML: 2.5 INJECTION, SOLUTION INFILTRATION; PERINEURAL at 12:05

## 2025-05-01 RX ADMIN — BUPIVACAINE HYDROCHLORIDE 2 ML: 2.5 INJECTION, SOLUTION INFILTRATION; PERINEURAL at 12:05

## 2025-05-01 RX ADMIN — METHYLPREDNISOLONE ACETATE 40 MG: 40 INJECTION, SUSPENSION INTRA-ARTICULAR; INTRALESIONAL; INTRAMUSCULAR; SOFT TISSUE at 12:05

## 2025-05-01 NOTE — PROGRESS NOTES
Mariia Denise NP  [facility]          Follow up:   Chief Complaint   Patient presents with    Right Shoulder - Pain        History of Present Illness: Lamine Fong is a 70 y.o. male  Who presents with recurrent bilateral shoulder pain with the right shoulder has always been worse than the left.  He received a cortisone injection last visit only in the right shoulder which only provided a few weeks' worth of relief after speaking with the patient about the severity of the right shoulder glenohumeral osteoarthritis I did talk to him about the possibility of moving forward with a right shoulder arthroplasty.  He presents today stating that he would like some more information regarding the surgery as he still continues to hurt in the right shoulder worse than left and would like bilateral shoulder injections    ROS:   Neuro: awake, alert and oriented  Pulm: no resp distress  Muscloskeletal:  Bilateral shoulder pain right greater than left loss in range of motion    Past Medical History:   Past Medical History:   Diagnosis Date    Alcohol abuse     Quit 1/2022    Anticoagulant long-term use     Aortic aneurysm     Artery occlusion     Carotid artery occlusion     COPD (chronic obstructive pulmonary disease)     no home O2    Coronary artery disease     Depression     Diabetes mellitus     Hypertension     Hypokalemia     Hypomagnesemia     SHANELLE (obstructive sleep apnea)     without cpap      Past Surgical History:   Procedure Laterality Date    ANGIOGRAPHY OF LOWER EXTREMITY Right 1/23/2023    Procedure: Angiogram Extremity Unilateral/ right leg;  Surgeon: Bharat Fletcher IV, MD;  Location: [facility] CATH LAB;  Service: Cardiovascular;  Laterality: Right;    CAROTID ENDARTERECTOMY Left 9/27/2022    Procedure: ENDARTERECTOMY-CAROTID;  Surgeon: Bharat Fletcher IV, MD;  Location: [facility] OR;  Service: Cardiovascular;  Laterality: Left;    HERNIA REPAIR      umbilical    LEFT HEART  CATHETERIZATION Left 2/28/2022    Procedure: CATHETERIZATION, HEART, LEFT;  Surgeon: Jadiel Simons MD;  Location: [facility] CATH LAB;  Service: Cardiology;  Laterality: Left;      Family History   Problem Relation Name Age of Onset    Diabetes Mellitus Father Matt Fong     Diabetes Father Matt Fong     Heart disease Father Matt Fong     Diabetes Mother Richelle Fong     Heart disease Brother Edin Fong       Social History[1]   Medication List with Changes/Refills   Current Medications    ALBUTEROL (PROVENTIL/VENTOLIN HFA) 90 MCG/ACTUATION INHALER    Inhale 2 puffs into the lungs every 4 (four) hours as needed for Wheezing or Shortness of Breath.    AMLODIPINE (NORVASC) 10 MG TABLET    TAKE 1 TABLET BY MOUTH DAILY    ASPIRIN (ECOTRIN) 81 MG EC TABLET    Take 1 tablet (81 mg total) by mouth once daily.    BLOOD PRESSURE CUFF MISC    1 each by Misc.(Non-Drug; Combo Route) route 2 (two) times a day.    BLOOD SUGAR DIAGNOSTIC STRP    To check BG 2 times daily, to use with insurance preferred meter    CHOLESTYRAMINE (QUESTRAN) 4 GRAM PACKET    Take 1 packet (4 g total) by mouth once daily.    CILOSTAZOL (PLETAL) 100 MG TAB    TAKE 1 TABLET BY MOUTH TWICE DAILY    CITALOPRAM (CELEXA) 20 MG TABLET    TAKE 1 TABLET BY MOUTH ONCE A DAY (FOR MOOD)    GABAPENTIN (NEURONTIN) 300 MG CAPSULE    TAKE 3 CAPSULES BY MOUTH EVERY EVENING    GLIMEPIRIDE (AMARYL) 4 MG TABLET    Take 1 tablet (4 mg total) by mouth 2 (two) times daily before meals.    IBUPROFEN (ADVIL,MOTRIN) 600 MG TABLET    Take 1 tablet (600 mg total) by mouth every 8 (eight) hours as needed for Pain.    KETOCONAZOLE (NIZORAL) 2 % CREAM    Apply topically 2 (two) times daily. To affected areas in between toes.    LANCETS MISC    To check BG 2 times daily, to use with insurance preferred meter    LOSARTAN (COZAAR) 50 MG TABLET    TAKE 1 TABLET BY MOUTH ONCE A DAY (FOR BLOOD PRESSURE)    METFORMIN (GLUCOPHAGE) 1000 MG TABLET    Take 1 tablet (1,000 mg total)  "by mouth 2 (two) times daily with meals.    METOPROLOL SUCCINATE (TOPROL-XL) 25 MG 24 HR TABLET    TAKE 1 TABLET BY MOUTH ONCE A DAY (FOR BLOOD PRESSURE)    PANTOPRAZOLE (PROTONIX) 40 MG TABLET    (FOR ACID REFLUX) TAKE 1 TABLET BY MOUTH DAILY    SIMVASTATIN (ZOCOR) 20 MG TABLET    Take 1 tablet (20 mg total) by mouth every evening.    SITAGLIPTIN PHOSPHATE (JANUVIA) 100 MG TAB    Take 1 tablet (100 mg total) by mouth once daily.    TIRZEPATIDE (MOUNJARO) 2.5 MG/0.5 ML PNIJ    Inject 2.5 mg into the skin every 7 days.    TYLENOL 325 MG TABLET    Take 650 mg by mouth every 6 (six) hours as needed.    VITAMIN B-1, MONONITRATE, 100 MG TAB    Take 100 mg by mouth every morning.    VITAMIN B-12 1000 MCG TABLET    Take 1,000 mcg by mouth every 7 days.      Review of patient's allergies indicates:  No Known Allergies       Physical Exam:   BMI: Body mass index is 31.69 kg/m². 70 y.o. male  6' 4" (1.93 m) 118.1 kg (260 lb 5.8 oz) examination of right shoulder demonstrates inability to perform active abduction secondary to severe pain with active assisted circumduction there is crepitance.  there is pain upon palpation of the AC joint he has positive empty.  He has difficulty reaching hand to the back of his head he can only internally rotate to lateral right hip.  no improvement from initial range of motion assessment right shoulder.  sensation circulation are intact however decreased strength 3/5  Left shoulder:  Demonstrates better range of motion as compared to right he lacks the final degrees in forward elevation.  He can internally rotate the left shoulder to his back pocket he has active abduction to 90° he can reach hand ahead position this is painful for him sensation circulation are intact    Assessment/Plan:  1. Chronic right shoulder pain    2. Chronic pain of both shoulders       Patient Instructions   Medications:    We will continue with Motrin 600 mg 2-3 times daily   Injections:   I personally reviewed the " patients last HbgA1C 9/30/24 6.2 before performing any cortisone injections, due to the possible risk of elevation in blood sugar.  Patient was instructed to check blood sugar throughout the day and the next 72hours and to be aware of any signs of elevated blood sugar such as: nausea/vomiting, headache, feeling weak or tired, feeling very thirsty, increase urination and/or blurred vision. The patient verbalized understanding and agreed to receive the injection, all questions were answered. CPT 3044F        Previous injections provided last visit in the right shoulder helped for a few weeks  Injection of Depo-Medrol 40 mg given in right and left shoulder subacromial posterior approach   Education:    I personally spent at least more than 15 minutes developing, teaching, explaining the anatomy of the shoulder.  I showed the patient shoulder models to explain to him what a total shoulder arthroplasty and a reverse shoulder arthroplasty looks like.  I went over the perioperative intraoperative and postoperative protocols.  He had the opportunity to have all questions were answered.    Imaging:   I will order an MRI of the right shoulder due to the fact that the patient has end-stage glenohumeral degenerative arthritis into validate integrity of rotator cuff to determine total versus reverse shoulder arthroplasty options    Referral:    Dr. Guido at the [facility] to go over MRI results and discuss right shoulder arthroplasty in further detail   Return to clinic:    Dr. Guido for MRI results    I discussed worrisome and red flag signs and symptoms with the patient. The patient expressed understanding and agreed to alert me immediately or to go to the emergency room if they experience any of these.   Treatment plan was developed with input from the patient/family, and they expressed understanding and agreement with the plan. All questions were answered today.             Mariia Denise, NP-C  Orthopedic Nurse  Pracitioner  [facility]         [1]   Social History  Socioeconomic History    Marital status:    Tobacco Use    Smoking status: Every Day     Current packs/day: 0.00     Average packs/day: 1 pack/day for 53.9 years (53.9 ttl pk-yrs)     Types: Cigarettes, Vaping with nicotine     Start date: 1/1/1968     Last attempt to quit: 12/6/2021     Years since quitting: 3.4    Smokeless tobacco: Current   Substance and Sexual Activity    Alcohol use: Not Currently     Comment: History of alcoholism    Drug use: No    Sexual activity: Yes     Partners: Female     Social Drivers of Health     Financial Resource Strain: Low Risk  (2/5/2024)    Overall Financial Resource Strain (CARDIA)     Difficulty of Paying Living Expenses: Not hard at all   Food Insecurity: No Food Insecurity (2/5/2024)    Hunger Vital Sign     Worried About Running Out of Food in the Last Year: Never true     Ran Out of Food in the Last Year: Never true   Transportation Needs: No Transportation Needs (2/5/2024)    PRAPARE - Transportation     Lack of Transportation (Medical): No     Lack of Transportation (Non-Medical): No   Physical Activity: Inactive (2/5/2024)    Exercise Vital Sign     Days of Exercise per Week: 0 days     Minutes of Exercise per Session: 0 min   Stress: No Stress Concern Present (2/5/2024)    [employer] [place] [employer] - Occupational Stress Questionnaire     Feeling of Stress : Not at all   Housing Stability: Low Risk  (2/5/2024)    Housing Stability Vital Sign     Unable to Pay for Housing in the Last Year: No     Number of Places Lived in the Last Year: 1     Unstable Housing in the Last Year: No

## 2025-05-01 NOTE — PATIENT INSTRUCTIONS
Medications:    We will continue with Motrin 600 mg 2-3 times daily   Injections:   I personally reviewed the patients last HbgA1C 9/30/24 6.2 before performing any cortisone injections, due to the possible risk of elevation in blood sugar.  Patient was instructed to check blood sugar throughout the day and the next 72hours and to be aware of any signs of elevated blood sugar such as: nausea/vomiting, headache, feeling weak or tired, feeling very thirsty, increase urination and/or blurred vision. The patient verbalized understanding and agreed to receive the injection, all questions were answered. CPT 3044F        Previous injections provided last visit in the right shoulder helped for a few weeks  Injection of Depo-Medrol 40 mg given in right and left shoulder subacromial posterior approach   Education:    I personally spent at least more than 15 minutes developing, teaching, explaining the anatomy of the shoulder.  I showed the patient shoulder models to explain to him what a total shoulder arthroplasty and a reverse shoulder arthroplasty looks like.  I went over the perioperative intraoperative and postoperative protocols.  He had the opportunity to have all questions were answered.    Imaging:   I will order an MRI of the right shoulder due to the fact that the patient has end-stage glenohumeral degenerative arthritis into validate integrity of rotator cuff to determine total versus reverse shoulder arthroplasty options    Referral:    Dr. Guido at the [facility] to go over MRI results and discuss right shoulder arthroplasty in further detail   Return to clinic:    Dr. Guido for MRI results

## 2025-05-01 NOTE — PROCEDURES
Large Joint Injection: bilateral subacromial bursa    Date/Time: 5/1/2025 12:00 PM    Performed by: Mariia Denise NP  Authorized by: Mariia Denise NP    Consent Done?:  Yes (Verbal)  Indications:  Pain  Site marked: the procedure site was marked    Timeout: prior to procedure the correct patient, procedure, and site was verified    Local anesthesia used?: No      Details:  Needle Size:  25 G  Ultrasonic Guidance for needle placement?: No    Approach:  Posterior  Location:  Shoulder  Laterality:  Bilateral  Site:  Bilateral subacromial bursa  Medications (Right):  40 mg methylPREDNISolone acetate 40 mg/mL; 3 mL BUPivacaine 0.25% (2.5 mg/ml) 0.25 % (2.5 mg/mL)  Medications (Left):  2 mL BUPivacaine 0.25% (2.5 mg/ml) 0.25 % (2.5 mg/mL); 40 mg methylPREDNISolone acetate 40 mg/mL  Patient tolerance:  Patient tolerated the procedure well with no immediate complications

## 2025-05-05 ENCOUNTER — OFFICE VISIT (OUTPATIENT)
Dept: INTERNAL MEDICINE | Facility: CLINIC | Age: 70
End: 2025-05-05
Payer: MEDICARE

## 2025-05-05 ENCOUNTER — LAB VISIT (OUTPATIENT)
Dept: LAB | Facility: HOSPITAL | Age: 70
End: 2025-05-05
Attending: FAMILY MEDICINE
Payer: MEDICARE

## 2025-05-05 VITALS
HEART RATE: 69 BPM | WEIGHT: 258.81 LBS | DIASTOLIC BLOOD PRESSURE: 84 MMHG | OXYGEN SATURATION: 95 % | HEIGHT: 76 IN | BODY MASS INDEX: 31.51 KG/M2 | SYSTOLIC BLOOD PRESSURE: 132 MMHG

## 2025-05-05 DIAGNOSIS — K80.20 CHOLELITHIASIS WITHOUT CHOLECYSTITIS: ICD-10-CM

## 2025-05-05 DIAGNOSIS — E66.811 OBESITY (BMI 30.0-34.9): ICD-10-CM

## 2025-05-05 DIAGNOSIS — I50.32 CHRONIC DIASTOLIC (CONGESTIVE) HEART FAILURE: ICD-10-CM

## 2025-05-05 DIAGNOSIS — E11.69 HYPERLIPIDEMIA ASSOCIATED WITH TYPE 2 DIABETES MELLITUS: ICD-10-CM

## 2025-05-05 DIAGNOSIS — I25.118 CORONARY ARTERY DISEASE OF NATIVE ARTERY OF NATIVE HEART WITH STABLE ANGINA PECTORIS: ICD-10-CM

## 2025-05-05 DIAGNOSIS — K21.9 GASTROESOPHAGEAL REFLUX DISEASE WITHOUT ESOPHAGITIS: ICD-10-CM

## 2025-05-05 DIAGNOSIS — I15.2 HYPERTENSION ASSOCIATED WITH DIABETES: ICD-10-CM

## 2025-05-05 DIAGNOSIS — E78.5 HYPERLIPIDEMIA ASSOCIATED WITH TYPE 2 DIABETES MELLITUS: ICD-10-CM

## 2025-05-05 DIAGNOSIS — K82.8 GALLBLADDER MASS: ICD-10-CM

## 2025-05-05 DIAGNOSIS — I73.9 PAD (PERIPHERAL ARTERY DISEASE): ICD-10-CM

## 2025-05-05 DIAGNOSIS — J43.1 PANLOBULAR EMPHYSEMA: ICD-10-CM

## 2025-05-05 DIAGNOSIS — I65.23 BILATERAL CAROTID ARTERY STENOSIS: ICD-10-CM

## 2025-05-05 DIAGNOSIS — E11.59 HYPERTENSION ASSOCIATED WITH DIABETES: ICD-10-CM

## 2025-05-05 DIAGNOSIS — F32.89 OTHER DEPRESSION: ICD-10-CM

## 2025-05-05 DIAGNOSIS — Z98.890 H/O CAROTID ENDARTERECTOMY: ICD-10-CM

## 2025-05-05 DIAGNOSIS — R19.7 DIARRHEA, UNSPECIFIED TYPE: ICD-10-CM

## 2025-05-05 DIAGNOSIS — Z87.891 FORMER SMOKER: ICD-10-CM

## 2025-05-05 DIAGNOSIS — E11.59 TYPE 2 DIABETES MELLITUS WITH OTHER CIRCULATORY COMPLICATION, WITHOUT LONG-TERM CURRENT USE OF INSULIN: ICD-10-CM

## 2025-05-05 DIAGNOSIS — I71.43 INFRARENAL ABDOMINAL AORTIC ANEURYSM (AAA) WITHOUT RUPTURE: ICD-10-CM

## 2025-05-05 DIAGNOSIS — G62.9 NEUROPATHY: ICD-10-CM

## 2025-05-05 DIAGNOSIS — E11.59 TYPE 2 DIABETES MELLITUS WITH OTHER CIRCULATORY COMPLICATION, WITHOUT LONG-TERM CURRENT USE OF INSULIN: Primary | ICD-10-CM

## 2025-05-05 DIAGNOSIS — Z12.2 SCREENING FOR LUNG CANCER: ICD-10-CM

## 2025-05-05 LAB
EAG (OHS): 163 MG/DL (ref 68–131)
HBA1C MFR BLD: 7.3 % (ref 4–5.6)

## 2025-05-05 PROCEDURE — 99215 OFFICE O/P EST HI 40 MIN: CPT | Mod: S$PBB,,, | Performed by: FAMILY MEDICINE

## 2025-05-05 PROCEDURE — 36415 COLL VENOUS BLD VENIPUNCTURE: CPT

## 2025-05-05 PROCEDURE — 99213 OFFICE O/P EST LOW 20 MIN: CPT | Mod: PBBFAC | Performed by: FAMILY MEDICINE

## 2025-05-05 PROCEDURE — 83036 HEMOGLOBIN GLYCOSYLATED A1C: CPT

## 2025-05-05 PROCEDURE — G2211 COMPLEX E/M VISIT ADD ON: HCPCS | Mod: S$PBB,,, | Performed by: FAMILY MEDICINE

## 2025-05-05 PROCEDURE — 99999 PR PBB SHADOW E&M-EST. PATIENT-LVL III: CPT | Mod: PBBFAC,,, | Performed by: FAMILY MEDICINE

## 2025-05-05 RX ORDER — DULOXETIN HYDROCHLORIDE 30 MG/1
30 CAPSULE, DELAYED RELEASE ORAL DAILY
Qty: 90 CAPSULE | Refills: 0 | Status: SHIPPED | OUTPATIENT
Start: 2025-05-05 | End: 2025-08-03

## 2025-05-05 NOTE — PROGRESS NOTES
Subjective:   Patient ID: Lamine Fong is a 70 y.o. male.  Chief Complaint:  Follow-up    Presents for follow-up chronic medical conditions     Last visit September 2024 for annual exam   A1c 6.2% . Diabetes well controlled . Continue metformin 1000 mg twice a day. Continue Amaryl 4 mg twice a day. Start Januvia 100 mg daily in interim until can restart Mounjaro 2.5 mg weekly injections. Recheck A1c 6 months   Lipid panel improved.  LDL less than 100.  Goal less than 70 . Continue simvastatin 20 mg daily and restart Repatha injections when affordable   CBC with normal white blood cell count, red blood cell count, and platelet levels.   Sugar, Kidney, Liver, and Electrolyte tests are all normal.   Thyroid testing is normal.   Microalbumin negative     Medical History:  - Diabetes mellitus.  Currently only taking metformin 1000 mg twice a day and Amaryl 4 mg twice a day.  States unable to start Januvia 100 mg daily or Mounjaro 2.5 mg weekly injection due to cost.  Denies symptoms hypo hyperglycemia.  Eye exam and microalbumin up-to-date.  Needs repeat A1c and foot exam..  - Hypertension with stable chronic diastolic Heart failure an Abdominal aortic aneurysm.  Controlled.  Followed by Cardiology.  On amlodipine 10 mg daily, Toprol-XL 25 mg daily, and losartan 50 mg daily.  Reports compliance.  Denies side effects.  No orthostasis.  No shortness of breath.    - Hyperlipidemia with Peripheral vascular disease, Coronary artery disease, and bilateral Carotid artery stenosis. Last CMP with normal kidney, liver, glucose,and electrolytes.  September 2024 LDL remains less than 100.  Prescribed simvastatin 20 mg daily and Repatha injections every 2 weeks.  In addition to aspirin 81 mg daily and simvastatin 20 mg daily with Pletal 100 mg twice a day.  Unfortunately has also been unable to fill/start Repatha Currently denies any chest pain or claudication.  - Neuropathy.  Multifactorial.  On gabapentin 300 mg 3 tablets  nightly and multivitamin with B12 and B1 supplementation.  Unable to tolerate daytime gabapentin dose.  Reports increasing neuropathy and overall pain pattern since last visit. Last vitamin levels normal.  - History of alcohol abuse.  Continues to remain alcohol free.  - Depression. On citalopram 20 mg daily.  Higher dose not recommended due to underlying cardiac disease.  Reports compliance.  Denies side effects.  States mood and symptoms currently not as well as previously controlled.  Likely related to increased pain.  - Chronic sun damaged skin.  Up-to-date on dermatology follow-up.  - GERD.  Stable on Protonix 40 mg daily.  - Cholelithiasis.  No cholecystitis.  No increased abdominal pain with GLP1 use.  Chronic diarrhea likely metformin and/or/gallbladder related.  Last visit prescribed Questran 4 mg daily.  Diarrhea resolved/improved.  Reviewed your chart also showed previously noted gallbladder polyp.  Overdue for follow-up imaging to document size/stability.  - Emphysema.  Stable.  No frequent rescue inhaler use.  No controller medication needed.    - Former Smoker.  Quit 3 years ago.  Overdue for low-dose CT lung cancer screening.      Health maintenance needs include shingles vaccine series, RSV vaccine, COVID booster, Prevnar 20 vaccine in addition to colon cancer screening and low-dose CT lung cancer screening.      Review of Systems   Constitutional:  Negative for activity change, appetite change, chills, diaphoresis, fatigue and fever.   Eyes:  Negative for visual disturbance.   Respiratory:  Negative for cough, chest tightness, shortness of breath and wheezing.    Cardiovascular:  Negative for chest pain, palpitations and leg swelling.   Gastrointestinal:  Negative for abdominal pain, constipation, diarrhea, nausea and vomiting.   Endocrine: Negative for cold intolerance, heat intolerance, polydipsia, polyphagia and polyuria.   Genitourinary:  Negative for difficulty urinating.   Musculoskeletal:   "Negative for myalgias and neck pain.   Skin:  Negative for rash.   Neurological:  Positive for numbness. Negative for dizziness, tremors, syncope, weakness, light-headedness and headaches.   Hematological:  Does not bruise/bleed easily.   Psychiatric/Behavioral:  Positive for dysphoric mood. Negative for agitation, behavioral problems, confusion, decreased concentration, hallucinations, self-injury, sleep disturbance and suicidal ideas. The patient is nervous/anxious. The patient is not hyperactive.      Objective:   /84 (BP Location: Left arm, Patient Position: Sitting)   Pulse 69   Ht 6' 4" (1.93 m)   Wt 117.4 kg (258 lb 13.1 oz)   SpO2 95%   BMI 31.50 kg/m²     Physical Exam  Vitals and nursing note reviewed.   Constitutional:       Appearance: Normal appearance. He is well-developed. He is obese.   Eyes:      General: Vision grossly intact. No scleral icterus.     Extraocular Movements: Extraocular movements intact.      Conjunctiva/sclera: Conjunctivae normal.      Pupils: Pupils are equal, round, and reactive to light.   Neck:      Thyroid: No thyroid mass, thyromegaly or thyroid tenderness.      Vascular: Normal carotid pulses. No carotid bruit or JVD.   Cardiovascular:      Rate and Rhythm: Normal rate and regular rhythm.      Pulses: Normal pulses.           Radial pulses are 2+ on the right side and 2+ on the left side.        Dorsalis pedis pulses are 2+ on the right side and 2+ on the left side.      Heart sounds: Normal heart sounds.   Pulmonary:      Effort: Pulmonary effort is normal.      Breath sounds: Normal breath sounds.   Abdominal:      General: There is no distension.      Palpations: Abdomen is soft.      Tenderness: There is no abdominal tenderness.   Musculoskeletal:         General: Normal range of motion.      Right lower leg: No edema.      Left lower leg: No edema.      Right foot: No deformity or bunion.      Left foot: No deformity or bunion.   Feet:      Right foot:      " Protective Sensation: 5 sites tested.  5 sites sensed.      Skin integrity: Callus present. No ulcer, skin breakdown or dry skin.      Toenail Condition: Right toenails are abnormally thick and long. Fungal disease present.     Left foot:      Protective Sensation: 5 sites tested.  5 sites sensed.      Skin integrity: Callus present. No ulcer, skin breakdown or dry skin.      Toenail Condition: Left toenails are abnormally thick and long. Fungal disease present.  Lymphadenopathy:      Cervical: No cervical adenopathy.   Skin:     Findings: Bruising and ecchymosis present. No rash or wound.   Neurological:      Mental Status: He is alert and oriented to person, place, and time.   Psychiatric:         Attention and Perception: Attention and perception normal.         Mood and Affect: Mood is depressed. Mood is not anxious.         Speech: Speech normal.         Behavior: Behavior normal. Behavior is cooperative.         Thought Content: Thought content normal.         Cognition and Memory: Cognition and memory normal.       Assessment:       ICD-10-CM ICD-9-CM   1. Type 2 diabetes mellitus with other circulatory complication, without long-term current use of insulin  E11.59 250.70   2. Obesity (BMI 30.0-34.9)  E66.811 278.00   3. Hypertension associated with diabetes  E11.59 250.80    I15.2 401.9   4. Chronic diastolic (congestive) heart failure  I50.32 428.32     428.0   5. Hyperlipidemia associated with type 2 diabetes mellitus  E11.69 250.80    E78.5 272.4   6. Coronary artery disease of native artery of native heart with stable angina pectoris  I25.118 414.01     413.9   7. Bilateral carotid artery stenosis  I65.23 433.10     433.30   8. H/O carotid endarterectomy  Z98.890 V45.89   9. Infrarenal abdominal aortic aneurysm (AAA) without rupture  I71.43 441.4   10. PAD (peripheral artery disease)  I73.9 443.9   11. Gastroesophageal reflux disease without esophagitis  K21.9 530.81   12. Cholelithiasis without  cholecystitis  K80.20 574.20   13. Gallbladder mass  K82.8 575.8   14. Diarrhea, unspecified type  R19.7 787.91   15. Other depression  F32.89 311   16. Neuropathy  G62.9 355.9   17. Panlobular emphysema  J43.1 492.8   18. Former smoker  Z87.891 V15.82   19. Screening for lung cancer  Z12.2 V76.0   20. Primary osteoarthritis of right shoulder  M19.011 715.11     Plan:   Type 2 diabetes mellitus with other circulatory complication, without long-term current use of insulin    Obesity (BMI 30.0-34.9)  -     Hemoglobin A1C; Future; Expected date: 05/05/2025  Check A1c   Continue Amaryl 4 mg twice a day  Continue metformin 1000 mg twice a day  If A1c greater than 8%, restart Mounjaro 2.5 mg weekly injections   Eye exam and microalbumin up-to-date   Foot exam stable     Hypertension associated with diabetes  Chronic diastolic (congestive) heart failure  Controlled.  Stable.  Asymptomatic.  BP at goal.    Continue amlodipine 10 mg daily   Continue Toprol-XL 25 mg daily   Continue losartan 50 mg daily     Hyperlipidemia associated with type 2 diabetes mellitus  Coronary artery disease of native artery of native heart with stabl  Bilateral carotid artery stenosis  H/O carotid endarterectomy  Infrarenal abdominal aortic aneurysm (AAA) without rupture  PAD (peripheral artery disease)  Asymptomatic   Continue simvastatin 20 mg daily, aspirin 81 mg daily, and please call 100 mg twice a day   Restart Repatha if/when affordable   Follow-up cardiology as scheduled     Gastroesophageal reflux disease without esophagitis  Symptoms stable and well controlled   Continue Protonix 40 mg daily     Cholelithiasis without cholecystitis  Gallbladder mass  -     US Abdomen Limited_Gallbladder; Future; Expected date: 05/05/2025  Schedule overdue ultrasound to follow-up on gallbladder polyp and document size and stability of mass     Diarrhea, unspecified type  Symptoms significantly improved   Continue Questran packet daily     Other  depression  -     DULoxetine (CYMBALTA) 30 MG capsule; Take 1 capsule (30 mg total) by mouth once daily.  Dispense: 90 capsule; Refill: 0  Start Cymbalta 30 mg daily   Take citalopram 10 mg daily for an additional 2 weeks then discontinue   Discuss should help improve mood and provide better pain control   Reassess 6 weeks     Neuropathy  -     DULoxetine (CYMBALTA) 30 MG capsule; Take 1 capsule (30 mg total) by mouth once daily.  Dispense: 90 capsule; Refill: 0  Add Cymbalta   Continue gabapentin 900 mg nightly  Reassess 6 weeks     Panlobular emphysema  Symptoms stable and well controlled   No frequent rescue inhaler use as needed   No controller medication indicated     Former smoker  Screening for lung cancer  -     CT Chest Lung Screening Low Dose; Future; Expected date: 05/05/2025    Follow up with any/all specialists scheduled     Return to clinic 6 weeks    40 minutes of total time spent on the encounter, which includes face to face time and non-face to face time preparing to see the patient (eg, review of tests), Obtaining and/or reviewing separately obtained history, documenting clinical information in the electronic or other health record, independently interpreting results (not separately reported) and communicating results to the patient/family/caregiver, or Care coordination (not separately reported)    Visit today included increased complexity associated with managing the longitudinal care of the patient due to the serious and/or complex managed problem(s) listed above.

## 2025-05-06 ENCOUNTER — TELEPHONE (OUTPATIENT)
Dept: SPORTS MEDICINE | Facility: CLINIC | Age: 70
End: 2025-05-06
Payer: MEDICARE

## 2025-05-06 NOTE — TELEPHONE ENCOUNTER
----- Message from Med Assistant Perez sent at 5/2/2025  4:19 PM CDT -----  Can you reach out to this konstantin Monday at some point and get him on the schedule with Hay, Can be seen about 6-8 weeks out since he just got a cortisone injection on 5/1 would need to wait 3 months to have surgery

## 2025-05-08 ENCOUNTER — HOSPITAL ENCOUNTER (OUTPATIENT)
Dept: RADIOLOGY | Facility: HOSPITAL | Age: 70
Discharge: HOME | End: 2025-05-08
Attending: NURSE PRACTITIONER
Payer: MEDICARE

## 2025-05-08 DIAGNOSIS — M25.511 CHRONIC RIGHT SHOULDER PAIN: ICD-10-CM

## 2025-05-08 DIAGNOSIS — G89.29 CHRONIC RIGHT SHOULDER PAIN: ICD-10-CM

## 2025-05-08 PROCEDURE — 73221 MRI JOINT UPR EXTREM W/O DYE: CPT | Mod: 26,RT,, | Performed by: RADIOLOGY

## 2025-05-08 PROCEDURE — 73221 MRI JOINT UPR EXTREM W/O DYE: CPT | Mod: TC,RT

## 2025-05-09 ENCOUNTER — RESULTS FOLLOW-UP (OUTPATIENT)
Dept: INTERNAL MEDICINE | Facility: CLINIC | Age: 70
End: 2025-05-09

## 2025-05-09 DIAGNOSIS — E11.65 TYPE 2 DIABETES MELLITUS WITH HYPERGLYCEMIA, WITHOUT LONG-TERM CURRENT USE OF INSULIN: ICD-10-CM

## 2025-05-09 RX ORDER — TIRZEPATIDE 2.5 MG/.5ML
2.5 INJECTION, SOLUTION SUBCUTANEOUS
Qty: 12 PEN | Refills: 3 | Status: SHIPPED | OUTPATIENT
Start: 2025-05-09

## 2025-05-15 DIAGNOSIS — I15.2 HYPERTENSION ASSOCIATED WITH DIABETES: Primary | Chronic | ICD-10-CM

## 2025-05-15 DIAGNOSIS — I50.32 CHRONIC DIASTOLIC (CONGESTIVE) HEART FAILURE: ICD-10-CM

## 2025-05-15 DIAGNOSIS — E11.59 HYPERTENSION ASSOCIATED WITH DIABETES: Primary | Chronic | ICD-10-CM

## 2025-05-23 RX ORDER — CILOSTAZOL 100 MG/1
100 TABLET ORAL 2 TIMES DAILY
Qty: 60 TABLET | Refills: 11 | Status: SHIPPED | OUTPATIENT
Start: 2025-05-23

## 2025-06-05 NOTE — PROGRESS NOTES
Orthopaedics Sports Medicine     Shoulder Initial Visit         6/10/2025    Referring MD: No ref. provider found    Chief Complaint   Patient presents with    Right Shoulder - Pain       History of Present Illness:   Lamine Fong is a 70 y.o. right-hand dominant male who presents with right shoulder pain and dysfunction. The patient presents today on referral from Mariia Denise NP who has been treating him for right shoulder glenohumeral arthritis. He was initially seen on 4/17/25 at which time he reported a history of exacerbation of right shoulder pain and restricted range of motion.  He stated that he did have minor multiple joint pain he had always had restriction in both of his shoulders as he dated back to previous football injuries. He denied any shi dislocations he had never had any surgeries on either shoulder he had an injection in his right shoulder over 40 years ago. 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 He was found to have right shoulder osteoarthritis and they proceeded with subacromial space CSI at that time as well as prescription for Ibuprofen. He returned on 5/1/24 and noted a few weeks of relief with previous CSI. At that time they elected to proceed with MRI of the right shoulder for evaluation of his rotator cuff for preoperative planning for shoulder replacement and also proceeded with bilateral subacromial space CSIs.     Evaluation to date: X-Ray, MRI     Treatment to date: Rest, activity modification, Motrin, corticosteroid injection (4/17/25- right), 5/1/25- bilateral)      Past Medical History:   Past Medical History:   Diagnosis Date    Alcohol abuse     Quit 1/2022    Anticoagulant long-term use     Aortic aneurysm     Artery occlusion     Carotid artery occlusion     COPD (chronic obstructive  pulmonary disease)     no home O2    Coronary artery disease     Depression     Diabetes mellitus     Hypertension     Hypokalemia     Hypomagnesemia     SHANELLE (obstructive sleep apnea)     without cpap       Past Surgical History:   Past Surgical History:   Procedure Laterality Date    ANGIOGRAPHY OF LOWER EXTREMITY Right 1/23/2023    Procedure: Angiogram Extremity Unilateral/ right leg;  Surgeon: Bharat Fletcher IV, MD;  Location: [facility] CATH LAB;  Service: Cardiovascular;  Laterality: Right;    CAROTID ENDARTERECTOMY Left 9/27/2022    Procedure: ENDARTERECTOMY-CAROTID;  Surgeon: Bharat Fletcher IV, MD;  Location: [facility] OR;  Service: Cardiovascular;  Laterality: Left;    HERNIA REPAIR      umbilical    LEFT HEART CATHETERIZATION Left 2/28/2022    Procedure: CATHETERIZATION, HEART, LEFT;  Surgeon: Jadiel Simons MD;  Location: [facility] CATH LAB;  Service: Cardiology;  Laterality: Left;       Medications:  Patient's Medications   New Prescriptions    No medications on file   Previous Medications    ALBUTEROL (PROVENTIL/VENTOLIN HFA) 90 MCG/ACTUATION INHALER    Inhale 2 puffs into the lungs every 4 (four) hours as needed for Wheezing or Shortness of Breath.    AMLODIPINE (NORVASC) 10 MG TABLET    TAKE 1 TABLET BY MOUTH DAILY    ASPIRIN (ECOTRIN) 81 MG EC TABLET    Take 1 tablet (81 mg total) by mouth once daily.    BLOOD PRESSURE CUFF MISC    1 each by Misc.(Non-Drug; Combo Route) route 2 (two) times a day.    BLOOD SUGAR DIAGNOSTIC STRP    To check BG 2 times daily, to use with insurance preferred meter    CHOLESTYRAMINE (QUESTRAN) 4 GRAM PACKET    Take 1 packet (4 g total) by mouth once daily.    CILOSTAZOL (PLETAL) 100 MG TAB    TAKE 1 TABLET BY MOUTH TWICE DAILY    DULOXETINE (CYMBALTA) 30 MG CAPSULE    Take 1 capsule (30 mg total) by mouth once daily.    GABAPENTIN (NEURONTIN) 300 MG CAPSULE    TAKE 3 CAPSULES BY MOUTH EVERY EVENING    GLIMEPIRIDE (AMARYL) 4 MG TABLET    Take 1 tablet (4 mg total) by mouth 2 (two)  "times daily before meals.    KETOCONAZOLE (NIZORAL) 2 % CREAM    Apply topically 2 (two) times daily. To affected areas in between toes.    LANCETS MISC    To check BG 2 times daily, to use with insurance preferred meter    LOSARTAN (COZAAR) 50 MG TABLET    TAKE 1 TABLET BY MOUTH ONCE A DAY (FOR BLOOD PRESSURE)    METFORMIN (GLUCOPHAGE) 1000 MG TABLET    Take 1 tablet (1,000 mg total) by mouth 2 (two) times daily with meals.    METOPROLOL SUCCINATE (TOPROL-XL) 25 MG 24 HR TABLET    TAKE 1 TABLET BY MOUTH ONCE A DAY (FOR BLOOD PRESSURE)    PANTOPRAZOLE (PROTONIX) 40 MG TABLET    (FOR ACID REFLUX) TAKE 1 TABLET BY MOUTH DAILY    SIMVASTATIN (ZOCOR) 20 MG TABLET    Take 1 tablet (20 mg total) by mouth every evening.    TIRZEPATIDE (MOUNJARO) 2.5 MG/0.5 ML PNIJ    Inject 2.5 mg into the skin every 7 days.    TYLENOL 325 MG TABLET    Take 650 mg by mouth every 6 (six) hours as needed.    VITAMIN B-1, MONONITRATE, 100 MG TAB    Take 100 mg by mouth every morning.    VITAMIN B-12 1000 MCG TABLET    Take 1,000 mcg by mouth every 7 days.   Modified Medications    No medications on file   Discontinued Medications    No medications on file       Allergies: Review of patient's allergies indicates:  No Known Allergies    Social History:   Home town: [place], [place]  Alcohol use: He reports that he does not currently use alcohol.  Tobacco use: He reports that he has been smoking cigarettes and vaping with nicotine. He started smoking about 57 years ago. He has a 53.9 pack-year smoking history. He uses smokeless tobacco.    Review of systems:  History of recent illness, fevers, shakes, or chills: no  History of cardiac problems or chest pain: Yes  History of pulmonary problems or asthma: no  History of diabetes: Yes (7.3 on 5/5)  History of prior dvt or clotting problems: no  History of sleep apnea: no      Physical Examination:  Estimated body mass index is 31.5 kg/m² as calculated from the following:    Height as of 5/5/25: 6' 4" " (1.93 m).    Weight as of 5/5/25: 117.4 kg (258 lb 13.1 oz).    General  Healthy appearing male in no acute distress  Alert and oriented, normal mood, appropriate affect    Shoulder Examination:  Patient is alert and oriented, no distress. Skin is intact. Neuro is normal with no focal motor or sensory findings.    Cervical exam is unremarkable. Intact cervical ROM. Negative Spurling's test    Physical Exam:  RIGHT    LEFT    Scap Dyskinesis/Winging (-)    (-)    Tenderness:          Greater Tuberosity             +    +  Bicipital Groove  (-)    (-)  AC joint   (-)    (-)  Other:     ROM:  Forward Elevation 140    140  Abduction  100    100  ER (at side)  40    40  IR   L3    L3    Strength:   Supraspinatus  4/5    4/5  Infraspinatus  4/5    4/5  Subscap / IR  5/5    5/5     Special Tests:   Neer:    +    +   Mcdonald:   +    +   SS Stress:   +    +   Bear Hug:   (-)    (-)   Chaves's:   +    +   Resisted Thrower's:   +    +    Neurovascular examination  - Motor grossly intact bilaterally to shoulder abduction, elbow flexion and extension, wrist flexion and extension, and intrinsic hand musculature  - Sensation intact to light touch bilaterally in axillary, median, radial, and ulnar distributions  - Symmetrical radial pulses      Imaging:  XR Results:  Results for orders placed during the hospital encounter of 04/17/25    X-ray Shoulder 2 or More Views Right    Narrative  EXAMINATION:  XR SHOULDER COMPLETE 2 OR MORE VIEWS RIGHT    CLINICAL HISTORY:  Pain in right shoulder    TECHNIQUE:  Standard radiography performed.  Four view right shoulder series    COMPARISON:  None    FINDINGS:  Advanced osteoarthritis involving the glenohumeral joint.  2.3 cm possible loose body in the subacromial joint space.    Impression  See above.      Electronically signed by: Nas De Los Santos  Date:    04/17/2025  Time:    13:53      MRI Results:  Results for orders placed during the hospital encounter of 05/08/25    MRI Shoulder Without  Contrast Right    Narrative  EXAMINATION:  MRI SHOULDER WITHOUT CONTRAST RIGHT    CLINICAL HISTORY:  Shoulder pain, chronic, osteoarthritis suspected;  Pain in right shoulder    TECHNIQUE:  Multisequence, multiplanar MR imaging of the shoulder performed without contrast.    COMPARISON:  04/17/2025 radiograph    FINDINGS:  Rotator cuff:    Supraspinatus: Severe calcific tendinosis with high-grade interstitial tearing present.  Bursal partial-thickness fraying noted.    Infraspinatus: Mild tendinosis.    Subscapularis: Insertional tendinosis    Teres minor: Intact    Mild-to-moderate supraspinatus muscle bulk loss with minimal to mild teres minor fatty atrophy.    Labrum: Prominent circumferential degenerative fraying.    Biceps: Long head biceps tendon is intact.  Small loose bodies suspected within the biceps tendon sheath.    Bone: No acute fracture.  Focal defect within the anterior humeral head with adjacent prominent subcortical cyst.  Possible reverse Hill-Sachs.  Prominent inferior glenohumeral collar osteophyte changes.    Acromioclavicular joint:Prominent degenerative hypertrophy.    Cartilage: Diffuse glenohumeral full-thickness loss with joint space narrowing    Miscellaneous: No significant joint effusion.  Subacromial/subdeltoid bursal fluid present.    Impression  Advanced glenohumeral/labral degenerative findings including prominent osteophyte formation, circumferential degenerative labral tearing, joint space/cartilage loss.    Rotator cuff tendinosis, severe throughout the supraspinatus tendon with high-grade interstitial tearing.  Less prevalent tendinosis of the infraspinatus and subscapularis tendons.    Prominent AC joint hypertrophy.    Other findings as above.      Electronically signed by: Alexandru Salazar MD  Date:    05/08/2025  Time:    13:41      CT Results:  No results found for this or any previous visit.      Physician Read: I agree with the above impression.      Impression:  70 y.o.  male with right shoulder glenohumeral arthritis, rotator cuff tendinosis         Plan:  Reviewed imaging and discussed diagnosis and treatment options with patient today. His MRI confirms advanced glenohumeral arthritis. His rotator cuff is grossly intact with some tendinosis present.   Discussed non-operative treatment options in the form of rest, activity modifications, oral anti-inflammatories, corticosteroid versus biologic injections, and physical therapy/physician directed home exercise program. Discussed that definitive management of this condition consists of operative treatment in the form of total shoulder arthroplasty.   He is currently being treated for an aneurysm and this is his focus at this time. He would like to avoid any operative treatment for his shoulder at this time. We will continue with non-operative management for now. Discussed intermittent CSIs for this but advised we would like to wait at least 3 months between these injections. May also use Tylenol arthritis (1000 mg 3x/day) to help with his symptoms as well.   Follow-up with me as needed.          Khanh Guido MD    I, Renaldo Tolliver, acted as a scribe for Khanh Guido MD for the duration of this office visit.

## 2025-06-10 ENCOUNTER — HOSPITAL ENCOUNTER (OUTPATIENT)
Dept: RADIOLOGY | Facility: HOSPITAL | Age: 70
Discharge: HOME | End: 2025-06-10
Attending: FAMILY MEDICINE
Payer: MEDICARE

## 2025-06-10 ENCOUNTER — OFFICE VISIT (OUTPATIENT)
Dept: SPORTS MEDICINE | Facility: CLINIC | Age: 70
End: 2025-06-10
Payer: MEDICARE

## 2025-06-10 DIAGNOSIS — K80.20 CHOLELITHIASIS WITHOUT CHOLECYSTITIS: ICD-10-CM

## 2025-06-10 DIAGNOSIS — Z12.2 SCREENING FOR LUNG CANCER: ICD-10-CM

## 2025-06-10 DIAGNOSIS — K82.8 GALLBLADDER MASS: ICD-10-CM

## 2025-06-10 DIAGNOSIS — M67.813 TENDINOSIS OF RIGHT ROTATOR CUFF: ICD-10-CM

## 2025-06-10 DIAGNOSIS — M19.011 PRIMARY OSTEOARTHRITIS OF RIGHT SHOULDER: Primary | ICD-10-CM

## 2025-06-10 DIAGNOSIS — Z87.891 FORMER SMOKER: ICD-10-CM

## 2025-06-10 PROCEDURE — 99213 OFFICE O/P EST LOW 20 MIN: CPT | Mod: PBBFAC,25 | Performed by: STUDENT IN AN ORGANIZED HEALTH CARE EDUCATION/TRAINING PROGRAM

## 2025-06-10 PROCEDURE — 76705 ECHO EXAM OF ABDOMEN: CPT | Mod: 26,,, | Performed by: RADIOLOGY

## 2025-06-10 PROCEDURE — 71271 CT THORAX LUNG CANCER SCR C-: CPT | Mod: TC

## 2025-06-10 PROCEDURE — 71271 CT THORAX LUNG CANCER SCR C-: CPT | Mod: 26,,, | Performed by: STUDENT IN AN ORGANIZED HEALTH CARE EDUCATION/TRAINING PROGRAM

## 2025-06-10 PROCEDURE — 99214 OFFICE O/P EST MOD 30 MIN: CPT | Mod: S$PBB,,, | Performed by: STUDENT IN AN ORGANIZED HEALTH CARE EDUCATION/TRAINING PROGRAM

## 2025-06-10 PROCEDURE — 99999 PR PBB SHADOW E&M-EST. PATIENT-LVL III: CPT | Mod: PBBFAC,,, | Performed by: STUDENT IN AN ORGANIZED HEALTH CARE EDUCATION/TRAINING PROGRAM

## 2025-06-10 PROCEDURE — 76705 ECHO EXAM OF ABDOMEN: CPT | Mod: TC

## 2025-06-10 RX ORDER — METOPROLOL SUCCINATE 25 MG/1
TABLET, EXTENDED RELEASE ORAL
Qty: 30 TABLET | Refills: 11 | Status: SHIPPED | OUTPATIENT
Start: 2025-06-10

## 2025-06-16 ENCOUNTER — TELEPHONE (OUTPATIENT)
Dept: INTERNAL MEDICINE | Facility: CLINIC | Age: 70
End: 2025-06-16
Payer: MEDICARE

## 2025-06-16 NOTE — TELEPHONE ENCOUNTER
Contact pt regarding appointment change, appointment is rescheduled for July 14 @ 9:40am. [place]

## 2025-06-17 DIAGNOSIS — I15.2 HYPERTENSION ASSOCIATED WITH DIABETES: Primary | Chronic | ICD-10-CM

## 2025-06-17 DIAGNOSIS — E11.59 HYPERTENSION ASSOCIATED WITH DIABETES: Primary | Chronic | ICD-10-CM

## 2025-06-17 RX ORDER — LOSARTAN POTASSIUM 50 MG/1
TABLET ORAL
Qty: 90 TABLET | Refills: 3 | Status: SHIPPED | OUTPATIENT
Start: 2025-06-17

## 2025-06-20 ENCOUNTER — TELEPHONE (OUTPATIENT)
Dept: CARDIOLOGY | Facility: CLINIC | Age: 70
End: 2025-06-20
Payer: MEDICARE

## 2025-06-20 DIAGNOSIS — I50.32 CHRONIC DIASTOLIC (CONGESTIVE) HEART FAILURE: ICD-10-CM

## 2025-06-20 DIAGNOSIS — E11.59 HYPERTENSION ASSOCIATED WITH DIABETES: Primary | Chronic | ICD-10-CM

## 2025-06-20 DIAGNOSIS — I15.2 HYPERTENSION ASSOCIATED WITH DIABETES: Primary | Chronic | ICD-10-CM

## 2025-06-20 NOTE — TELEPHONE ENCOUNTER
Paperwork received pending signature.     Copied from CRM #[id number]. Topic: General Inquiry - Patient Advice  >> Jun 20, 2025 11:08 AM Shikha wrote:  Type:  Needs Medical Advice    Who Called: Tatianna[facility]  They want to speak about medical information of Mr Fong  Would the patient rather a call back or a response via [facility]? Call back  Best Call Back Number: , choose the nurse extension  Thanks!

## 2025-06-30 ENCOUNTER — HOSPITAL ENCOUNTER (OUTPATIENT)
Dept: CARDIOLOGY | Facility: HOSPITAL | Age: 70
Discharge: HOME | End: 2025-06-30
Attending: INTERNAL MEDICINE
Payer: MEDICARE

## 2025-06-30 ENCOUNTER — OFFICE VISIT (OUTPATIENT)
Dept: CARDIOLOGY | Facility: CLINIC | Age: 70
End: 2025-06-30
Payer: MEDICARE

## 2025-06-30 VITALS
SYSTOLIC BLOOD PRESSURE: 132 MMHG | HEART RATE: 99 BPM | WEIGHT: 249.88 LBS | DIASTOLIC BLOOD PRESSURE: 70 MMHG | BODY MASS INDEX: 30.43 KG/M2 | HEIGHT: 76 IN | OXYGEN SATURATION: 96 %

## 2025-06-30 DIAGNOSIS — I73.9 PAD (PERIPHERAL ARTERY DISEASE): Chronic | ICD-10-CM

## 2025-06-30 DIAGNOSIS — I71.43 INFRARENAL ABDOMINAL AORTIC ANEURYSM (AAA) WITHOUT RUPTURE: ICD-10-CM

## 2025-06-30 DIAGNOSIS — E11.59 HYPERTENSION ASSOCIATED WITH DIABETES: Chronic | ICD-10-CM

## 2025-06-30 DIAGNOSIS — I65.23 BILATERAL CAROTID ARTERY STENOSIS: Chronic | ICD-10-CM

## 2025-06-30 DIAGNOSIS — I25.118 CORONARY ARTERY DISEASE OF NATIVE ARTERY OF NATIVE HEART WITH STABLE ANGINA PECTORIS: Chronic | ICD-10-CM

## 2025-06-30 DIAGNOSIS — E11.69 HYPERLIPIDEMIA ASSOCIATED WITH TYPE 2 DIABETES MELLITUS: Chronic | ICD-10-CM

## 2025-06-30 DIAGNOSIS — I15.2 HYPERTENSION ASSOCIATED WITH DIABETES: Chronic | ICD-10-CM

## 2025-06-30 DIAGNOSIS — Z98.890 H/O CAROTID ENDARTERECTOMY: ICD-10-CM

## 2025-06-30 DIAGNOSIS — Z01.810 PREOP CARDIOVASCULAR EXAM: Primary | ICD-10-CM

## 2025-06-30 DIAGNOSIS — E78.5 HYPERLIPIDEMIA ASSOCIATED WITH TYPE 2 DIABETES MELLITUS: Chronic | ICD-10-CM

## 2025-06-30 DIAGNOSIS — I50.32 CHRONIC DIASTOLIC (CONGESTIVE) HEART FAILURE: ICD-10-CM

## 2025-06-30 DIAGNOSIS — E11.59 TYPE 2 DIABETES MELLITUS WITH OTHER CIRCULATORY COMPLICATION, WITHOUT LONG-TERM CURRENT USE OF INSULIN: Chronic | ICD-10-CM

## 2025-06-30 PROCEDURE — 99214 OFFICE O/P EST MOD 30 MIN: CPT | Mod: PBBFAC | Performed by: INTERNAL MEDICINE

## 2025-06-30 PROCEDURE — 99999 PR PBB SHADOW E&M-EST. PATIENT-LVL IV: CPT | Mod: PBBFAC,,, | Performed by: INTERNAL MEDICINE

## 2025-06-30 RX ORDER — EZETIMIBE 10 MG/1
10 TABLET ORAL NIGHTLY
Qty: 90 TABLET | Refills: 3 | Status: SHIPPED | OUTPATIENT
Start: 2025-06-30 | End: 2026-06-30

## 2025-06-30 RX ORDER — METOPROLOL TARTRATE 25 MG/1
25 TABLET, FILM COATED ORAL
COMMUNITY

## 2025-06-30 RX ORDER — SIMVASTATIN 40 MG/1
40 TABLET, FILM COATED ORAL NIGHTLY
Qty: 90 TABLET | Refills: 2 | Status: SHIPPED | OUTPATIENT
Start: 2025-06-30

## 2025-06-30 RX ORDER — FOLIC ACID 1 MG/1
1 TABLET ORAL
COMMUNITY

## 2025-06-30 NOTE — PROGRESS NOTES
Subjective:   Patient ID:  Lamine Fong is a 70 y.o. male who presents for follow up of No chief complaint on file.      69 yo male came in for 6 months f/u. And Preop cleaarnce  AAA stenting by Dr. Fletcher  [facility] CAD h/o C showed distal LCX , orthostatic hypotension. DM > 5 yrs former heavy drinker and smoker, coronary atherosclerosis. PAD, AAA 5 cm, left iliac aneurysm, and carotid artery Dz s/p left CEA by Dr. Fletcher  In , c/o+ postaural dizziness when taking amlodipine, metorpolol and clonidine.   Now on amlodipine only for HTN and dizziness resolved.   Drinking issue on and off for 30 years. Pint vodka and beers daily, Off alcohol for 30 days,   Smoker 40 yrs 1ppd and quit 2 months ago, now vaping and patch   echo biv function;  Carotid US There is less than 50% diameter reduction on the right. There is 50-69% stenosis within the left proximal and mid ICA  Brain mild microvascular Dz   abd CT Coronary and aortic calcific atherosclerosis are noted. AAA 4.6 cm infra renal      LE arterial US showed bilaetral SAF pcclued and ABIs 0.54  MPI showed possible apical ischemia and normal EF  Leg calf muscle pain after walking for 10 min. No rest pain. The feet normal color and warm. PT DP pulse weak  Off Metoprolol deu to bradycardia,.   Added lipitor     visit  H/o C showed distal LCx 100% lesion and collateral circulation to LPDA. Continue medical Rx.  PAD with exertional claudication.  Right groin stable  No chest pain, leg swelling     03/23 visit  Facial numbness resolved after left carotid CEA.   Plan to have left leg fem bypass and then right leg bypass. AAA procedure in the future  No chest pain dizziness palpitation and leg swelling   SOB mild, quit smoking     10/23 visit  S/p bilateral fem bypass by Dr. Fletcher at [facility] in 05/23.   Now improved walking. Cramp resolved. Remains peripheral neuropathy.   No chest pain, dizziness palpitation orthopnea and PND. No active  bleeding   Some feet swelling  Ekg NSR PAcs  AAA stenting pending    01/19/24 failed to deliver repatha per [facility]    11/24 visit  Off repatha for 2 m due to donut hole and resume in 01/25. ( Per [facility], pt was unable to reach. )  LDL was 98 when took Repatha in 09/24  Denied chest pain, dyspnea on exertion, palpitation, fainting, PND, orthopnea, syncope and claudication.   BP A1c C  EKG reviewed by myself today reveals NSR       Interval history  S/p PAD bypass  Plan AAA stenting   11/24 EKG NSR  Repatha copay high  On moujaro  EKG reviewed by myself today reveals NSR chronic nonspecific STT change          History of Present Illness    CHIEF COMPLAINT:  - Lamine presents for follow-up to discuss management of multiple vascular issues, including coronary artery stenosis, DM, and HLD.    HPI:  - Last seen 6 months ago with no ED visits or hospitalizations since then, only follow-up with his doctor in the office  - History of coronary artery stenosis, DM, and multiple vascular issues affecting the legs, carotid arteries, and abdomen  - Underwent CABG 2 years ago, which included procedures on both legs, resulting in good outcomes  - No longer reports leg pain following this surgery  - DM management requires improvement, with current A1C of 7.3, above the target of less than 7  - Cholesterol levels, specifically LDL, are higher than desired for someone with atherosclerotic disease; LDL currently 95, while target should be less than 55  - Prescribed Repatha for cholesterol management but encountered issues with insurance coverage and high copays, leading to inconsistent use  - Currently taking Mounjaro, which has previously helped lower cholesterol levels and reduce food intake  - Considering an upcoming operation, though details and scheduling are not yet finalized  - Denies chest pain, dyspnea, dizziness, syncope, near-syncope, palpitations, and bleeding issues  - Does not report any side effects from current medications  -  Denies current cigarette use    CARDIAC HISTORY:  - Carotid artery disease  - Abdominal vascular disease  - PVD: bilateral leg surgery with good results, no current leg pain  - Multiple vascular issues: legs, carotid, and abdominal  - CABG 06/30/2023  - EKG 2024: unremarkable    MEDICATIONS:  - Statin  - Repatha (discontinued)  - Amaryl for diabetes  - Mounjaro for diabetes, he is resuming this medication    TEST RESULTS:  - A1C: 7.3, noted as elevated, should be less than 7  - LDL cholesterol: 95, noted as elevated for patient with atherosclerotic disease, should be less than 55    MEDICAL HISTORY:  - Diabetes  - Atherosclerotic disease    SURGICAL HISTORY:  - Leg surgery (possibly vascular): 2 years ago, both legs, good results    SOCIAL HISTORY:  - Vaping: Current, minimal use  - Smoking: Quit cigarettes          Past Medical History:   Diagnosis Date    Alcohol abuse     Quit 1/2022    Anticoagulant long-term use     Aortic aneurysm     Artery occlusion     Carotid artery occlusion     COPD (chronic obstructive pulmonary disease)     no home O2    Coronary artery disease     Depression     Diabetes mellitus     Hypertension     Hypokalemia     Hypomagnesemia     SHANELLE (obstructive sleep apnea)     without cpap       Past Surgical History:   Procedure Laterality Date    ANGIOGRAPHY OF LOWER EXTREMITY Right 1/23/2023    Procedure: Angiogram Extremity Unilateral/ right leg;  Surgeon: Bharat Fletcher IV, MD;  Location: [facility] CATH LAB;  Service: Cardiovascular;  Laterality: Right;    CAROTID ENDARTERECTOMY Left 9/27/2022    Procedure: ENDARTERECTOMY-CAROTID;  Surgeon: Bharat Fletcher IV, MD;  Location: [facility] OR;  Service: Cardiovascular;  Laterality: Left;    HERNIA REPAIR      umbilical    LEFT HEART CATHETERIZATION Left 2/28/2022    Procedure: CATHETERIZATION, HEART, LEFT;  Surgeon: Jadiel Simons MD;  Location: [facility] CATH LAB;  Service: Cardiology;  Laterality: Left;       Social History[1]    Family History    Problem Relation Name Age of Onset    Diabetes Mellitus Father Matt Ayelisha     Diabetes Father Matt Fong     Heart disease Father Matt Ayelisha     Diabetes Mother Richelle Fong     Heart disease Brother Edin HIGHTOWER    Objective:   Physical Exam    Lab Results   Component Value Date    CHOL 145 09/30/2024    CHOL 137 03/25/2024    CHOL 181 09/12/2023     Lab Results   Component Value Date    HDL 29 (L) 09/30/2024    HDL 27 (L) 03/25/2024    HDL 28 (L) 09/12/2023     Lab Results   Component Value Date    LDLCALC 94.6 09/30/2024    LDLCALC 88.8 03/25/2024    LDLCALC 135.6 09/12/2023     Lab Results   Component Value Date    TRIG 107 09/30/2024    TRIG 106 03/25/2024    TRIG 87 09/12/2023     Lab Results   Component Value Date    CHOLHDL 20.0 09/30/2024    CHOLHDL 19.7 (L) 03/25/2024    CHOLHDL 15.5 (L) 09/12/2023       Chemistry        Component Value Date/Time     09/30/2024 0937    K 4.6 09/30/2024 0937     09/30/2024 0937    CO2 29 09/30/2024 0937    BUN 11 09/30/2024 0937    CREATININE 1.2 09/30/2024 0937    GLU 92 09/30/2024 0937        Component Value Date/Time    CALCIUM 9.9 09/30/2024 0937    ALKPHOS 81 09/30/2024 0937    AST 15 09/30/2024 0937    ALT 14 09/30/2024 0937    BILITOT 0.4 09/30/2024 0937    ESTGFRAFRICA >60 07/30/2022 0840    ESTGFRAFRICA >60 07/30/2022 0840    EGFRNONAA >60 07/30/2022 0840    EGFRNONAA >60 07/30/2022 0840          Lab Results   Component Value Date    HGBA1C 7.3 (H) 05/05/2025     Lab Results   Component Value Date    TSH 1.901 09/30/2024     Lab Results   Component Value Date    INR 1.0 01/23/2023    INR 0.9 02/28/2022     Lab Results   Component Value Date    WBC 8.12 09/30/2024    HGB 15.2 09/30/2024    HCT 47.6 09/30/2024    MCV 82 09/30/2024     09/30/2024     BMP  Sodium   Date Value Ref Range Status   09/30/2024 137 136 - 145 mmol/L Final     Potassium   Date Value Ref Range Status   09/30/2024 4.6 3.5 - 5.1 mmol/L Final      Chloride   Date Value Ref Range Status   09/30/2024 101 95 - 110 mmol/L Final     CO2   Date Value Ref Range Status   09/30/2024 29 23 - 29 mmol/L Final     BUN   Date Value Ref Range Status   09/30/2024 11 8 - 23 mg/dL Final     Creatinine   Date Value Ref Range Status   09/30/2024 1.2 0.5 - 1.4 mg/dL Final     Calcium   Date Value Ref Range Status   09/30/2024 9.9 8.7 - 10.5 mg/dL Final     Anion Gap   Date Value Ref Range Status   09/30/2024 7 (L) 8 - 16 mmol/L Final     eGFR if    Date Value Ref Range Status   07/30/2022 >60 >60 mL/min/1.73 m^2 Final   07/30/2022 >60 >60 mL/min/1.73 m^2 Final     eGFR if non    Date Value Ref Range Status   07/30/2022 >60 >60 mL/min/1.73 m^2 Final     Comment:     Calculation used to obtain the estimated glomerular filtration  rate (eGFR) is the CKD-EPI equation.      07/30/2022 >60 >60 mL/min/1.73 m^2 Final     Comment:     Calculation used to obtain the estimated glomerular filtration  rate (eGFR) is the CKD-EPI equation.        BNP  @LABRCNTIP(BNP,BNPTRIAGEBLO)@  @LABRCNTIP(troponini)@  CrCl cannot be calculated (Patient's most recent lab result is older than the maximum 7 days allowed.).  No results found in the last 24 hours.  No results found in the last 24 hours.  No results found in the last 24 hours.    Assessment:      1. Preop cardiovascular exam    2. PAD (peripheral artery disease)    3. Hyperlipidemia associated with type 2 diabetes mellitus    4. Hypertension associated with diabetes    5. Coronary artery disease of native artery of native heart with stable angina pectoris    6. H/O carotid endarterectomy    7. Chronic diastolic (congestive) heart failure    8. Bilateral carotid artery stenosis    9. Infrarenal abdominal aortic aneurysm (AAA) without rupture    10. Type 2 diabetes mellitus with other circulatory complication, without long-term current use of insulin        Plan:   Preop cardiovascular exam    PAD (peripheral  artery disease)  -     Comprehensive Metabolic Panel; Future; Expected date: 06/30/2025  -     Lipid Panel; Future; Expected date: 06/30/2025    Hyperlipidemia associated with type 2 diabetes mellitus  -     Comprehensive Metabolic Panel; Future; Expected date: 06/30/2025  -     Lipid Panel; Future; Expected date: 06/30/2025    Hypertension associated with diabetes    Coronary artery disease of native artery of native heart with stable angina pectoris    H/O carotid endarterectomy    Chronic diastolic (congestive) heart failure    Bilateral carotid artery stenosis    Infrarenal abdominal aortic aneurysm (AAA) without rupture    Type 2 diabetes mellitus with other circulatory complication, without long-term current use of insulin    Other orders  -     simvastatin (ZOCOR) 40 MG tablet; Take 1 tablet (40 mg total) by mouth every evening.  Dispense: 90 tablet; Refill: 2  -     ezetimibe (ZETIA) 10 mg tablet; Take 1 tablet (10 mg total) by mouth every evening.  Dispense: 90 tablet; Refill: 3      Assessment & Plan    IMPRESSION:  - A1C of 7.3, slightly above target of <7, indicating need for better diabetes control.  - LDL cholesterol at 95, elevated given atherosclerotic disease; target should be <55.  - Evaluated options for lowering LDL, including increasing statin dose and adding Ezetimibe.  - Evaluated cardiovascular status for potential upcoming surgery clearance.    ATHEROSCLEROTIC HEART DISEASE AND HYPERLIPIDEMIA:  - Discussed that Repatha is considered one of the strongest cholesterol-lowering medications.  - Informed about potential side effects of new cholesterol medications (approximately 5% chance).  - Increased simvastatin from 20 mg to 40 mg daily, to be taken at bedtime.  - Started Ezetimibe (dosage not specified) as an additional cholesterol-lowering medication.  - Contact the office if having any side effects from the new cholesterol medications.    NICOTINE DEPENDENCE:  - Lamine to work on quitting  vaping.    FOLLOW-UP:  - Ordered repeat lab work in 6 weeks with follow up after results.          Increase simvastatin to 40 mg and add zetia 10 mg daily for HKD  Check CMP and Lipid in 6 weeks  Continue ASA Pletal amlodipine losartan BB   RTC in 6m       Elevated periop risk of CV events for non-high risk procedure.  Good functional and exercise capacity.  No chest pain, active arrhythmia and CHF symptoms.  Ok to proceed the scheduled surgery without further cardiac study.  OK to hold Aspirin and Pletal 5 to 7 days before the procedure if indicated and resume ASAP postop.      This note was generated with the assistance of ambient listening technology. Verbal consent was obtained by the patient and accompanying visitor(s) for the recording of patient appointment to facilitate this note. I attest to having reviewed and edited the generated note for accuracy, though some syntax or spelling errors may persist. Please contact the author of this note for any clarification.              [1]   Social History  Tobacco Use    Smoking status: Every Day     Current packs/day: 0.00     Average packs/day: 1 pack/day for 53.9 years (53.9 ttl pk-yrs)     Types: Cigarettes, Vaping with nicotine     Start date: 1/1/1968     Last attempt to quit: 12/6/2021     Years since quitting: 3.5    Smokeless tobacco: Current   Substance Use Topics    Alcohol use: Not Currently     Comment: History of alcoholism    Drug use: No

## 2025-07-01 ENCOUNTER — TELEPHONE (OUTPATIENT)
Dept: CARDIOLOGY | Facility: CLINIC | Age: 70
End: 2025-07-01
Payer: MEDICARE

## 2025-07-01 NOTE — TELEPHONE ENCOUNTER
Pre Op clearance along with the most recent EKG and Stress test were sent to the provided number:     Fax Number:   [phone]

## 2025-07-02 ENCOUNTER — TELEPHONE (OUTPATIENT)
Dept: CARDIOLOGY | Facility: CLINIC | Age: 70
End: 2025-07-02
Payer: MEDICARE

## 2025-07-02 NOTE — TELEPHONE ENCOUNTER
Received call from [facility] vascular in reference to not receiving Cardiac clearance due to system being down yesterday. Clearance and EKG resent by fax.

## 2025-07-14 ENCOUNTER — OFFICE VISIT (OUTPATIENT)
Dept: INTERNAL MEDICINE | Facility: CLINIC | Age: 70
End: 2025-07-14
Payer: MEDICARE

## 2025-07-14 ENCOUNTER — LAB VISIT (OUTPATIENT)
Dept: LAB | Facility: HOSPITAL | Age: 70
End: 2025-07-14
Attending: FAMILY MEDICINE
Payer: MEDICARE

## 2025-07-14 VITALS
HEART RATE: 83 BPM | BODY MASS INDEX: 30.25 KG/M2 | SYSTOLIC BLOOD PRESSURE: 124 MMHG | HEIGHT: 76 IN | DIASTOLIC BLOOD PRESSURE: 72 MMHG | OXYGEN SATURATION: 97 % | WEIGHT: 248.44 LBS

## 2025-07-14 DIAGNOSIS — E11.59 TYPE 2 DIABETES MELLITUS WITH OTHER CIRCULATORY COMPLICATION, WITHOUT LONG-TERM CURRENT USE OF INSULIN: ICD-10-CM

## 2025-07-14 DIAGNOSIS — F32.89 OTHER DEPRESSION: ICD-10-CM

## 2025-07-14 DIAGNOSIS — E11.59 TYPE 2 DIABETES MELLITUS WITH OTHER CIRCULATORY COMPLICATION, WITHOUT LONG-TERM CURRENT USE OF INSULIN: Primary | ICD-10-CM

## 2025-07-14 DIAGNOSIS — G62.9 NEUROPATHY: ICD-10-CM

## 2025-07-14 DIAGNOSIS — K80.20 CHOLELITHIASIS WITHOUT CHOLECYSTITIS: ICD-10-CM

## 2025-07-14 DIAGNOSIS — K82.8 GALLBLADDER MASS: ICD-10-CM

## 2025-07-14 DIAGNOSIS — I71.43 INFRARENAL ABDOMINAL AORTIC ANEURYSM (AAA) WITHOUT RUPTURE: ICD-10-CM

## 2025-07-14 LAB
EAG (OHS): 123 MG/DL (ref 68–131)
HBA1C MFR BLD: 5.9 % (ref 4–5.6)

## 2025-07-14 PROCEDURE — 99214 OFFICE O/P EST MOD 30 MIN: CPT | Mod: S$PBB,,, | Performed by: FAMILY MEDICINE

## 2025-07-14 PROCEDURE — 36415 COLL VENOUS BLD VENIPUNCTURE: CPT

## 2025-07-14 PROCEDURE — 99213 OFFICE O/P EST LOW 20 MIN: CPT | Mod: PBBFAC | Performed by: FAMILY MEDICINE

## 2025-07-14 PROCEDURE — 99999 PR PBB SHADOW E&M-EST. PATIENT-LVL III: CPT | Mod: PBBFAC,,, | Performed by: FAMILY MEDICINE

## 2025-07-14 PROCEDURE — G2211 COMPLEX E/M VISIT ADD ON: HCPCS | Mod: ,,, | Performed by: FAMILY MEDICINE

## 2025-07-14 PROCEDURE — 83036 HEMOGLOBIN GLYCOSYLATED A1C: CPT

## 2025-07-14 RX ORDER — DULOXETIN HYDROCHLORIDE 30 MG/1
30 CAPSULE, DELAYED RELEASE ORAL DAILY
Qty: 90 CAPSULE | Refills: 3 | Status: SHIPPED | OUTPATIENT
Start: 2025-07-14 | End: 2026-07-14

## 2025-07-14 RX ORDER — GABAPENTIN 300 MG/1
900 CAPSULE ORAL NIGHTLY
Start: 2025-07-14

## 2025-07-14 NOTE — PROGRESS NOTES
Subjective:   Patient ID: Lamine Fong is a 70 y.o. male.  Chief Complaint:  Follow-up    Presents for overdue follow-up chronic medical conditions   Last visit May 2025     - Diabetes.  A1c 7.3%.  Not as well controlled as previously.  Continue Amaryl 4 mg twice a day and metformin 1000 mg twice a day.  Advised to restart Mounjaro 2.5 mg weekly injection.  If unable to restart Mounjaro, notify office for 50 mg daily.  Able to start Mounjaro 2.5 mg daily.  Reports compliance.  Denies side effects.  Denies symptoms hypo or hyperglycemia.  Needs repeat A1c.    - Hypertension with CHF.  Controlled.  On amlodipine 10 mg daily, Toprol-XL 25 mg daily, and losartan 50 mg daily.    - Hyperlipidemia with known coronary artery disease, bilateral coronary artery stenosis with carotid endarterectomy, abdominal aortic aneurysm, peripheral artery disease.  On simvastatin 40 mg daily with aspirin 81 mg daily and Pletal 100 mg twice a day.  Recommend restart Repatha when affordable.  Unfortunately unable to start medication.  Up-to-date on cardiology follow-up.  Has seen vascular surgery is considering undergoing repair of his aneurysm.. Follow-up cardiology as scheduled   - GERD. Symptoms stable and well controlled on Protonix 40 mg daily   - Cholelithiasis without cholecystitis Gallbladder mass.  Underwent ultrasound. Gallstones, stable gallbladder polyp, fatty liver, and stable hepatic cyst. No additional evaluation or treatment needed  - Diarrhea.  Symptoms significantly improved on Questran packet daily.    - Depression and neuropathy..  Recommended change to Cymbalta 30 mg daily with weaning off of citalopram 10 mg daily.  Continue gabapentin 900 mg nightly.  Should help the pain in addition to improve mood.  Reports compliance with change.  Denies side effects.  Mood significantly improved.  Pain minimally improved.  - Panlobular emphysema. Symptoms stable and well controlled. No frequent rescue inhaler use as needed. No  "controller medication indicated   - Former smoker.  Low-dose CT lung cancer screening last visit stable.     No new complaints today    Review of Systems   Constitutional:  Negative for activity change, appetite change, chills, diaphoresis, fatigue and fever.   Eyes:  Negative for visual disturbance.   Respiratory:  Negative for cough, chest tightness, shortness of breath and wheezing.    Cardiovascular:  Negative for chest pain, palpitations and leg swelling.   Gastrointestinal:  Negative for abdominal pain, constipation, diarrhea, nausea and vomiting.   Endocrine: Negative for cold intolerance, heat intolerance, polydipsia, polyphagia and polyuria.   Genitourinary:  Negative for difficulty urinating.   Musculoskeletal:  Negative for myalgias and neck pain.   Skin:  Negative for rash.   Neurological:  Positive for numbness. Negative for dizziness, tremors, syncope, weakness, light-headedness and headaches.   Hematological:  Does not bruise/bleed easily.   Psychiatric/Behavioral:  Negative for agitation, behavioral problems, confusion, decreased concentration, dysphoric mood, hallucinations, self-injury, sleep disturbance and suicidal ideas. The patient is not nervous/anxious and is not hyperactive.        Objective:   /72 (BP Location: Right arm, Patient Position: Sitting)   Pulse 83   Ht 6' 4" (1.93 m)   Wt 112.7 kg (248 lb 7.3 oz)   SpO2 97%   BMI 30.24 kg/m²     Physical Exam  Vitals and nursing note reviewed.   Constitutional:       Appearance: Normal appearance. He is well-developed. He is obese.   Neurological:      Mental Status: He is alert and oriented to person, place, and time.      Coordination: Coordination is intact.      Gait: Gait is intact.   Psychiatric:         Attention and Perception: Attention normal. He is attentive.         Mood and Affect: Mood and affect normal. Mood is not anxious or depressed.         Speech: Speech normal.         Behavior: Behavior normal. Behavior is " cooperative.         Thought Content: Thought content normal.         Cognition and Memory: Cognition normal.         Judgment: Judgment normal.       Assessment:       ICD-10-CM ICD-9-CM   1. Type 2 diabetes mellitus with other circulatory complication, without long-term current use of insulin  E11.59 250.70   2. Other depression  F32.89 311   3. Neuropathy  G62.9 355.9   4. Infrarenal abdominal aortic aneurysm (AAA) without rupture  I71.43 441.4   5. Cholelithiasis without cholecystitis  K80.20 574.20   6. Gallbladder mass  K82.8 575.8     Plan:   Type 2 diabetes mellitus with other circulatory complication, without long-term current use of insulin  -     Hemoglobin A1C; Future; Expected date: 07/14/2025  Asymptomatic   Check A1c   Continue metformin 1000 mg twice a day   Continue Amaryl 4 mg twice a day   Adjust Mounjaro 2.5 mg weekly injection as needed     Other depression  -     DULoxetine (CYMBALTA) 30 MG capsule; Take 1 capsule (30 mg total) by mouth once daily.  Dispense: 90 capsule; Refill: 3  Significant improvement in symptoms with medication change   Continue Cymbalta 30 mg daily     Neuropathy  -     gabapentin (NEURONTIN) 300 MG capsule; Take 3 capsules (900 mg total) by mouth every evening.  -     DULoxetine (CYMBALTA) 30 MG capsule; Take 1 capsule (30 mg total) by mouth once daily.  Dispense: 90 capsule; Refill: 3  Mild-to-moderate improvement in pain with medication change   Continue Cymbalta 30 mg daily   Continue gabapentin 900 mg nightly     Infrarenal abdominal aortic aneurysm (AAA) without rupture  Follow up with vascular surgery as planned to schedule repair of aneurysm   We will need cardiac clearance prior to procedure     Cholelithiasis without cholecystitis  Gallbladder mass  Stable on most recent imaging   No additional evaluation or treatment needed     Return to clinic 8 weeks for annual exam    Visit today included increased complexity associated with managing the longitudinal care of  the patient due to the serious and/or complex managed problem(s) listed above.

## 2025-07-31 ENCOUNTER — PATIENT OUTREACH (OUTPATIENT)
Dept: ADMINISTRATIVE | Facility: HOSPITAL | Age: 70
End: 2025-07-31
Payer: MEDICARE

## 2025-07-31 NOTE — PROGRESS NOTES
[facility]BC OUTREACH: per chart review pt is OVERDUE for Colon cancer screen at home collection or [facility], spoke to pt son and he took message, pt son will inform pt and have him call me back.

## (undated) DEVICE — SYR ONLY LEUR TIP 30CC

## (undated) DEVICE — KIT MANIFOLD LOW PRESS TUBING

## (undated) DEVICE — COVER LIGHT HANDLE 80/CA

## (undated) DEVICE — APPLIER CLIP LIAGCLIP 9.375IN

## (undated) DEVICE — KIT SYR REUSABLE

## (undated) DEVICE — KIT INTRODUCER STIFFEN MICRO

## (undated) DEVICE — MANIFOLD 4 PORT

## (undated) DEVICE — PACK BASIC SETUP SC BR

## (undated) DEVICE — SEE MEDLINE ITEM 157187

## (undated) DEVICE — GAUZE SPONGE 4X4 12PLY

## (undated) DEVICE — APPLIER LIGACLIP SM 9.38IN

## (undated) DEVICE — GUIDEWIRE STD .035X180CM ANG

## (undated) DEVICE — TOWEL OR DISP STRL BLUE 4/PK

## (undated) DEVICE — GLIDE CATH ANGLED 4FR 65CM

## (undated) DEVICE — GUIDEWIRE WHOLEY HI TORQ 175CM

## (undated) DEVICE — CATH PIG145 INFINITI 5X110CM

## (undated) DEVICE — DRAPE THYROID SOFT STERILE

## (undated) DEVICE — GAUZE SPONGE PEANUT STRL

## (undated) DEVICE — SET EXTENSION STERILE 30IN

## (undated) DEVICE — NDL SAFETY 25G X 1.5 ECLIPSE

## (undated) DEVICE — CONTRAST VISIPAQUE 150ML

## (undated) DEVICE — APPLICATOR CHLORAPREP ORN 26ML

## (undated) DEVICE — SUT PROLENE 6-0 BV-1

## (undated) DEVICE — BLADE SCALP OPHTL RND TIP

## (undated) DEVICE — PACK HEART CATH BR

## (undated) DEVICE — GLOVE SURG BIOGEL LATEX SZ 7.5

## (undated) DEVICE — OMNIPAQUE 300MG 150ML VIAL

## (undated) DEVICE — PACK CATH LAB CUSTOM BR

## (undated) DEVICE — CATH JR4 5FR

## (undated) DEVICE — SHEATH INTRODUCER 6FR 11CM

## (undated) DEVICE — GOWN POLY REINF BRTH SLV XL

## (undated) DEVICE — STAPLER SKIN PROXIMATE WIDE

## (undated) DEVICE — ANGIOTOUCH KIT

## (undated) DEVICE — GEL AQUASONIC 100 STERILE20GM

## (undated) DEVICE — SEE MEDLINE ITEM 157206

## (undated) DEVICE — CATH IV 20GAX1.25 JELCO

## (undated) DEVICE — SHEATH INTRODUCER 5FR 10CM

## (undated) DEVICE — CATH INFINITI MULTIPAK JR4 5FR

## (undated) DEVICE — ELECTRODE REM PLYHSV RETURN 9

## (undated) DEVICE — HEMOSTAT SURGICEL 4X8IN

## (undated) DEVICE — SUT 7/0 18IN PROLENE BL MO

## (undated) DEVICE — SOL NACL 0.9% INJ 500ML BG

## (undated) DEVICE — KIT GLIDESHEATH SLEND 6FR 10CM

## (undated) DEVICE — BOOT SUTURE AID

## (undated) DEVICE — TOWELS MULTIFOLD WHITE

## (undated) DEVICE — WIRE GUIDE TEFLON 3CM .035 145

## (undated) DEVICE — ELECTRODE BLADE INSULATED 1 IN

## (undated) DEVICE — ADHESIVE DERMABOND ADVANCED

## (undated) DEVICE — SET DECANTER MEDICHOICE

## (undated) DEVICE — CATH ANGIO SOFT VU 5FR 65CM

## (undated) DEVICE — APPLIER LIGACLIP MED 11IN

## (undated) DEVICE — SEE MEDLINE ITEM 157194

## (undated) DEVICE — KIT SITE-RITE NDL GUIDE 21G

## (undated) DEVICE — Device

## (undated) DEVICE — DEV-O-LOOPS MINI BLUE

## (undated) DEVICE — CATH JL4 5FR